# Patient Record
Sex: FEMALE | Race: WHITE | Employment: OTHER | URBAN - METROPOLITAN AREA
[De-identification: names, ages, dates, MRNs, and addresses within clinical notes are randomized per-mention and may not be internally consistent; named-entity substitution may affect disease eponyms.]

---

## 2020-01-01 ENCOUNTER — HOSPITAL ENCOUNTER (INPATIENT)
Age: 85
LOS: 16 days | Discharge: HOME | End: 2020-11-06
Attending: INTERNAL MEDICINE | Admitting: INTERNAL MEDICINE

## 2020-01-01 ENCOUNTER — HOME CARE VISIT (OUTPATIENT)
Dept: SCHEDULING | Facility: HOME HEALTH | Age: 85
End: 2020-01-01
Payer: MEDICARE

## 2020-01-01 ENCOUNTER — APPOINTMENT (OUTPATIENT)
Dept: GENERAL RADIOLOGY | Age: 85
DRG: 872 | End: 2020-01-01
Attending: EMERGENCY MEDICINE
Payer: MEDICARE

## 2020-01-01 ENCOUNTER — HOSPITAL ENCOUNTER (INPATIENT)
Age: 85
LOS: 4 days | Discharge: HOSPICE | DRG: 872 | End: 2020-08-31
Attending: EMERGENCY MEDICINE | Admitting: INTERNAL MEDICINE
Payer: MEDICARE

## 2020-01-01 ENCOUNTER — APPOINTMENT (OUTPATIENT)
Dept: MRI IMAGING | Age: 85
DRG: 872 | End: 2020-01-01
Attending: NURSE PRACTITIONER
Payer: MEDICARE

## 2020-01-01 ENCOUNTER — HOME CARE VISIT (OUTPATIENT)
Dept: HOSPICE | Facility: HOSPICE | Age: 85
End: 2020-01-01
Payer: MEDICARE

## 2020-01-01 ENCOUNTER — PATIENT OUTREACH (OUTPATIENT)
Dept: CASE MANAGEMENT | Age: 85
End: 2020-01-01

## 2020-01-01 ENCOUNTER — HOSPITAL ENCOUNTER (OUTPATIENT)
Dept: LAB | Age: 85
Discharge: HOME | End: 2020-10-11
Payer: OTHER MISCELLANEOUS

## 2020-01-01 ENCOUNTER — HOME HEALTH ADMISSION (OUTPATIENT)
Dept: HOME HEALTH SERVICES | Facility: HOME HEALTH | Age: 85
End: 2020-01-01

## 2020-01-01 ENCOUNTER — HOSPICE ADMISSION (OUTPATIENT)
Dept: HOSPICE | Facility: HOSPICE | Age: 85
End: 2020-01-01
Payer: MEDICARE

## 2020-01-01 VITALS
DIASTOLIC BLOOD PRESSURE: 62 MMHG | RESPIRATION RATE: 16 BRPM | TEMPERATURE: 98.9 F | SYSTOLIC BLOOD PRESSURE: 110 MMHG | HEART RATE: 70 BPM

## 2020-01-01 VITALS
SYSTOLIC BLOOD PRESSURE: 140 MMHG | DIASTOLIC BLOOD PRESSURE: 80 MMHG | HEART RATE: 80 BPM | BODY MASS INDEX: 18.61 KG/M2 | RESPIRATION RATE: 20 BRPM | HEIGHT: 63 IN | WEIGHT: 105 LBS

## 2020-01-01 VITALS
HEART RATE: 84 BPM | SYSTOLIC BLOOD PRESSURE: 156 MMHG | RESPIRATION RATE: 18 BRPM | TEMPERATURE: 97.1 F | DIASTOLIC BLOOD PRESSURE: 74 MMHG

## 2020-01-01 VITALS
TEMPERATURE: 99 F | RESPIRATION RATE: 14 BRPM | HEIGHT: 62 IN | DIASTOLIC BLOOD PRESSURE: 76 MMHG | BODY MASS INDEX: 27.42 KG/M2 | OXYGEN SATURATION: 95 % | WEIGHT: 149 LBS | SYSTOLIC BLOOD PRESSURE: 166 MMHG | HEART RATE: 61 BPM

## 2020-01-01 VITALS
DIASTOLIC BLOOD PRESSURE: 78 MMHG | TEMPERATURE: 98.1 F | RESPIRATION RATE: 18 BRPM | HEART RATE: 84 BPM | SYSTOLIC BLOOD PRESSURE: 132 MMHG

## 2020-01-01 VITALS
SYSTOLIC BLOOD PRESSURE: 81 MMHG | HEART RATE: 83 BPM | RESPIRATION RATE: 20 BRPM | DIASTOLIC BLOOD PRESSURE: 52 MMHG | TEMPERATURE: 97 F

## 2020-01-01 VITALS
RESPIRATION RATE: 18 BRPM | TEMPERATURE: 97.3 F | HEART RATE: 76 BPM | DIASTOLIC BLOOD PRESSURE: 60 MMHG | SYSTOLIC BLOOD PRESSURE: 100 MMHG

## 2020-01-01 VITALS
DIASTOLIC BLOOD PRESSURE: 63 MMHG | TEMPERATURE: 98 F | SYSTOLIC BLOOD PRESSURE: 121 MMHG | HEART RATE: 79 BPM | RESPIRATION RATE: 18 BRPM

## 2020-01-01 VITALS
HEART RATE: 76 BPM | TEMPERATURE: 97.8 F | SYSTOLIC BLOOD PRESSURE: 146 MMHG | RESPIRATION RATE: 14 BRPM | DIASTOLIC BLOOD PRESSURE: 72 MMHG

## 2020-01-01 DIAGNOSIS — N39.0 URINARY TRACT INFECTION WITHOUT HEMATURIA, SITE UNSPECIFIED: Primary | ICD-10-CM

## 2020-01-01 LAB
ALBUMIN SERPL-MCNC: 3.9 G/DL (ref 3.2–4.6)
ALBUMIN/GLOB SERPL: 1.1 {RATIO} (ref 1.2–3.5)
ALP SERPL-CCNC: 47 U/L (ref 50–130)
ALT SERPL-CCNC: 20 U/L (ref 12–65)
ANION GAP SERPL CALC-SCNC: 4 MMOL/L (ref 7–16)
ANION GAP SERPL CALC-SCNC: 6 MMOL/L (ref 7–16)
ANION GAP SERPL CALC-SCNC: 7 MMOL/L (ref 7–16)
ANION GAP SERPL CALC-SCNC: 8 MMOL/L (ref 7–16)
ANION GAP SERPL CALC-SCNC: 8 MMOL/L (ref 7–16)
APPEARANCE UR: ABNORMAL
AST SERPL-CCNC: 62 U/L (ref 15–37)
BACTERIA SPEC CULT: ABNORMAL
BACTERIA URNS QL MICRO: ABNORMAL /HPF
BACTERIA URNS QL MICRO: ABNORMAL /HPF
BASOPHILS # BLD: 0.1 K/UL (ref 0–0.2)
BASOPHILS NFR BLD: 1 % (ref 0–2)
BILIRUB SERPL-MCNC: 0.5 MG/DL (ref 0.2–1.1)
BILIRUB UR QL: NEGATIVE
BUN SERPL-MCNC: 14 MG/DL (ref 8–23)
BUN SERPL-MCNC: 19 MG/DL (ref 8–23)
BUN SERPL-MCNC: 20 MG/DL (ref 8–23)
BUN SERPL-MCNC: 30 MG/DL (ref 8–23)
BUN SERPL-MCNC: 36 MG/DL (ref 8–23)
CALCIUM SERPL-MCNC: 8.3 MG/DL (ref 8.3–10.4)
CALCIUM SERPL-MCNC: 8.4 MG/DL (ref 8.3–10.4)
CALCIUM SERPL-MCNC: 8.7 MG/DL (ref 8.3–10.4)
CALCIUM SERPL-MCNC: 8.9 MG/DL (ref 8.3–10.4)
CALCIUM SERPL-MCNC: 9.6 MG/DL (ref 8.3–10.4)
CASTS URNS QL MICRO: ABNORMAL /LPF
CASTS URNS QL MICRO: ABNORMAL /LPF
CHLORIDE SERPL-SCNC: 108 MMOL/L (ref 98–107)
CHLORIDE SERPL-SCNC: 109 MMOL/L (ref 98–107)
CHLORIDE SERPL-SCNC: 110 MMOL/L (ref 98–107)
CHLORIDE SERPL-SCNC: 111 MMOL/L (ref 98–107)
CHLORIDE SERPL-SCNC: 115 MMOL/L (ref 98–107)
CO2 SERPL-SCNC: 23 MMOL/L (ref 21–32)
CO2 SERPL-SCNC: 24 MMOL/L (ref 21–32)
CO2 SERPL-SCNC: 25 MMOL/L (ref 21–32)
CO2 SERPL-SCNC: 25 MMOL/L (ref 21–32)
CO2 SERPL-SCNC: 28 MMOL/L (ref 21–32)
COLOR UR: YELLOW
CREAT SERPL-MCNC: 0.88 MG/DL (ref 0.6–1)
CREAT SERPL-MCNC: 0.95 MG/DL (ref 0.6–1)
CREAT SERPL-MCNC: 0.98 MG/DL (ref 0.6–1)
CREAT SERPL-MCNC: 1.09 MG/DL (ref 0.6–1)
CREAT SERPL-MCNC: 1.32 MG/DL (ref 0.6–1)
DIFFERENTIAL METHOD BLD: ABNORMAL
EOSINOPHIL # BLD: 0.1 K/UL (ref 0–0.8)
EOSINOPHIL # BLD: 0.3 K/UL (ref 0–0.8)
EOSINOPHIL # BLD: 0.3 K/UL (ref 0–0.8)
EOSINOPHIL NFR BLD: 0 % (ref 0.5–7.8)
EOSINOPHIL NFR BLD: 1 % (ref 0.5–7.8)
EOSINOPHIL NFR BLD: 1 % (ref 0.5–7.8)
EOSINOPHIL NFR BLD: 2 % (ref 0.5–7.8)
EOSINOPHIL NFR BLD: 4 % (ref 0.5–7.8)
EPI CELLS #/AREA URNS HPF: ABNORMAL /HPF
EPI CELLS #/AREA URNS HPF: ABNORMAL /HPF
ERYTHROCYTE [DISTWIDTH] IN BLOOD BY AUTOMATED COUNT: 14 % (ref 11.9–14.6)
ERYTHROCYTE [DISTWIDTH] IN BLOOD BY AUTOMATED COUNT: 14.1 % (ref 11.9–14.6)
ERYTHROCYTE [DISTWIDTH] IN BLOOD BY AUTOMATED COUNT: 14.3 % (ref 11.9–14.6)
ERYTHROCYTE [DISTWIDTH] IN BLOOD BY AUTOMATED COUNT: 14.3 % (ref 11.9–14.6)
ERYTHROCYTE [DISTWIDTH] IN BLOOD BY AUTOMATED COUNT: 14.4 % (ref 11.9–14.6)
EST. AVERAGE GLUCOSE BLD GHB EST-MCNC: 114 MG/DL
GLOBULIN SER CALC-MCNC: 3.4 G/DL (ref 2.3–3.5)
GLUCOSE SERPL-MCNC: 103 MG/DL (ref 65–100)
GLUCOSE SERPL-MCNC: 114 MG/DL (ref 65–100)
GLUCOSE SERPL-MCNC: 121 MG/DL (ref 65–100)
GLUCOSE SERPL-MCNC: 150 MG/DL (ref 65–100)
GLUCOSE SERPL-MCNC: 95 MG/DL (ref 65–100)
GLUCOSE UR STRIP.AUTO-MCNC: NEGATIVE MG/DL
HBA1C MFR BLD: 5.6 %
HCT VFR BLD AUTO: 31.4 % (ref 35.8–46.3)
HCT VFR BLD AUTO: 33.4 % (ref 35.8–46.3)
HCT VFR BLD AUTO: 33.5 % (ref 35.8–46.3)
HCT VFR BLD AUTO: 37.1 % (ref 35.8–46.3)
HCT VFR BLD AUTO: 38.5 % (ref 35.8–46.3)
HGB BLD-MCNC: 10.5 G/DL (ref 11.7–15.4)
HGB BLD-MCNC: 10.6 G/DL (ref 11.7–15.4)
HGB BLD-MCNC: 11.1 G/DL (ref 11.7–15.4)
HGB BLD-MCNC: 12.1 G/DL (ref 11.7–15.4)
HGB BLD-MCNC: 12.8 G/DL (ref 11.7–15.4)
HGB UR QL STRIP: ABNORMAL
IMM GRANULOCYTES # BLD AUTO: 0 K/UL (ref 0–0.5)
IMM GRANULOCYTES # BLD AUTO: 0.1 K/UL (ref 0–0.5)
IMM GRANULOCYTES # BLD AUTO: 0.1 K/UL (ref 0–0.5)
IMM GRANULOCYTES NFR BLD AUTO: 0 % (ref 0–5)
IMM GRANULOCYTES NFR BLD AUTO: 1 % (ref 0–5)
KETONES UR QL STRIP.AUTO: NEGATIVE MG/DL
LACTATE SERPL-SCNC: 1.1 MMOL/L (ref 0.4–2)
LEUKOCYTE ESTERASE UR QL STRIP.AUTO: ABNORMAL
LIPASE SERPL-CCNC: 192 U/L (ref 73–393)
LYMPHOCYTES # BLD: 1.4 K/UL (ref 0.5–4.6)
LYMPHOCYTES # BLD: 1.4 K/UL (ref 0.5–4.6)
LYMPHOCYTES # BLD: 1.8 K/UL (ref 0.5–4.6)
LYMPHOCYTES # BLD: 1.8 K/UL (ref 0.5–4.6)
LYMPHOCYTES # BLD: 2 K/UL (ref 0.5–4.6)
LYMPHOCYTES NFR BLD: 13 % (ref 13–44)
LYMPHOCYTES NFR BLD: 14 % (ref 13–44)
LYMPHOCYTES NFR BLD: 15 % (ref 13–44)
LYMPHOCYTES NFR BLD: 15 % (ref 13–44)
LYMPHOCYTES NFR BLD: 8 % (ref 13–44)
MAGNESIUM SERPL-MCNC: 2.2 MG/DL (ref 1.8–2.4)
MAGNESIUM SERPL-MCNC: 2.3 MG/DL (ref 1.8–2.4)
MAGNESIUM SERPL-MCNC: 2.4 MG/DL (ref 1.8–2.4)
MCH RBC QN AUTO: 30.3 PG (ref 26.1–32.9)
MCH RBC QN AUTO: 30.8 PG (ref 26.1–32.9)
MCH RBC QN AUTO: 31.3 PG (ref 26.1–32.9)
MCH RBC QN AUTO: 31.4 PG (ref 26.1–32.9)
MCH RBC QN AUTO: 31.4 PG (ref 26.1–32.9)
MCHC RBC AUTO-ENTMCNC: 31.6 G/DL (ref 31.4–35)
MCHC RBC AUTO-ENTMCNC: 32.6 G/DL (ref 31.4–35)
MCHC RBC AUTO-ENTMCNC: 33.2 G/DL (ref 31.4–35)
MCHC RBC AUTO-ENTMCNC: 33.2 G/DL (ref 31.4–35)
MCHC RBC AUTO-ENTMCNC: 33.4 G/DL (ref 31.4–35)
MCV RBC AUTO: 94 FL (ref 79.6–97.8)
MCV RBC AUTO: 94.1 FL (ref 79.6–97.8)
MCV RBC AUTO: 94.4 FL (ref 79.6–97.8)
MCV RBC AUTO: 94.6 FL (ref 79.6–97.8)
MCV RBC AUTO: 95.7 FL (ref 79.6–97.8)
MM INDURATION POC: 0 MM (ref 0–5)
MM INDURATION POC: 0 MM (ref 0–5)
MONOCYTES # BLD: 0.8 K/UL (ref 0.1–1.3)
MONOCYTES # BLD: 0.9 K/UL (ref 0.1–1.3)
MONOCYTES # BLD: 1.3 K/UL (ref 0.1–1.3)
MONOCYTES NFR BLD: 5 % (ref 4–12)
MONOCYTES NFR BLD: 7 % (ref 4–12)
MONOCYTES NFR BLD: 7 % (ref 4–12)
MONOCYTES NFR BLD: 8 % (ref 4–12)
MONOCYTES NFR BLD: 9 % (ref 4–12)
MUCOUS THREADS URNS QL MICRO: 0 /LPF
NEUTS SEG # BLD: 12.5 K/UL (ref 1.7–8.2)
NEUTS SEG # BLD: 14 K/UL (ref 1.7–8.2)
NEUTS SEG # BLD: 6.5 K/UL (ref 1.7–8.2)
NEUTS SEG # BLD: 9.5 K/UL (ref 1.7–8.2)
NEUTS SEG # BLD: 9.6 K/UL (ref 1.7–8.2)
NEUTS SEG NFR BLD: 71 % (ref 43–78)
NEUTS SEG NFR BLD: 76 % (ref 43–78)
NEUTS SEG NFR BLD: 76 % (ref 43–78)
NEUTS SEG NFR BLD: 81 % (ref 43–78)
NEUTS SEG NFR BLD: 83 % (ref 43–78)
NITRITE UR QL STRIP.AUTO: NEGATIVE
NRBC # BLD: 0 K/UL (ref 0–0.2)
PH UR STRIP: 8.5 [PH] (ref 5–9)
PLATELET # BLD AUTO: 311 K/UL (ref 150–450)
PLATELET # BLD AUTO: 333 K/UL (ref 150–450)
PLATELET # BLD AUTO: 374 K/UL (ref 150–450)
PLATELET # BLD AUTO: 381 K/UL (ref 150–450)
PLATELET # BLD AUTO: 404 K/UL (ref 150–450)
PMV BLD AUTO: 10.1 FL (ref 9.4–12.3)
PMV BLD AUTO: 10.1 FL (ref 9.4–12.3)
PMV BLD AUTO: 10.3 FL (ref 9.4–12.3)
PMV BLD AUTO: 10.3 FL (ref 9.4–12.3)
PMV BLD AUTO: 10.5 FL (ref 9.4–12.3)
POTASSIUM SERPL-SCNC: 2.9 MMOL/L (ref 3.5–5.1)
POTASSIUM SERPL-SCNC: 3.4 MMOL/L (ref 3.5–5.1)
POTASSIUM SERPL-SCNC: 3.5 MMOL/L (ref 3.5–5.1)
POTASSIUM SERPL-SCNC: 3.5 MMOL/L (ref 3.5–5.1)
POTASSIUM SERPL-SCNC: 3.6 MMOL/L (ref 3.5–5.1)
PPD POC: NEGATIVE NEGATIVE
PPD POC: NEGATIVE NEGATIVE
PROCALCITONIN SERPL-MCNC: 2.21 NG/ML
PROT SERPL-MCNC: 7.3 G/DL (ref 6.3–8.2)
PROT UR STRIP-MCNC: 100 MG/DL
RBC # BLD AUTO: 3.34 M/UL (ref 4.05–5.2)
RBC # BLD AUTO: 3.5 M/UL (ref 4.05–5.2)
RBC # BLD AUTO: 3.55 M/UL (ref 4.05–5.2)
RBC # BLD AUTO: 3.93 M/UL (ref 4.05–5.2)
RBC # BLD AUTO: 4.07 M/UL (ref 4.05–5.2)
RBC #/AREA URNS HPF: ABNORMAL /HPF
RBC #/AREA URNS HPF: ABNORMAL /HPF
SERVICE CMNT-IMP: ABNORMAL
SERVICE CMNT-IMP: ABNORMAL
SODIUM SERPL-SCNC: 140 MMOL/L (ref 136–145)
SODIUM SERPL-SCNC: 141 MMOL/L (ref 136–145)
SODIUM SERPL-SCNC: 142 MMOL/L (ref 136–145)
SODIUM SERPL-SCNC: 143 MMOL/L (ref 136–145)
SODIUM SERPL-SCNC: 145 MMOL/L (ref 136–145)
SP GR UR REFRACTOMETRY: 1.01 (ref 1–1.02)
UROBILINOGEN UR QL STRIP.AUTO: 0.2 EU/DL (ref 0.2–1)
WBC # BLD AUTO: 12.4 K/UL (ref 4.3–11.1)
WBC # BLD AUTO: 12.7 K/UL (ref 4.3–11.1)
WBC # BLD AUTO: 15.5 K/UL (ref 4.3–11.1)
WBC # BLD AUTO: 16.9 K/UL (ref 4.3–11.1)
WBC # BLD AUTO: 9.2 K/UL (ref 4.3–11.1)
WBC URNS QL MICRO: >100 /HPF
WBC URNS QL MICRO: ABNORMAL /HPF
YEAST URNS QL MICRO: ABNORMAL

## 2020-01-01 PROCEDURE — 74011250636 HC RX REV CODE- 250/636: Performed by: INTERNAL MEDICINE

## 2020-01-01 PROCEDURE — A6212 FOAM DRG <=16 SQ IN W/BORDER: HCPCS

## 2020-01-01 PROCEDURE — 74011250637 HC RX REV CODE- 250/637: Performed by: NURSE PRACTITIONER

## 2020-01-01 PROCEDURE — 0651 HSPC ROUTINE HOME CARE

## 2020-01-01 PROCEDURE — G0156 HHCP-SVS OF AIDE,EA 15 MIN: HCPCS

## 2020-01-01 PROCEDURE — 74011250636 HC RX REV CODE- 250/636: Performed by: NURSE PRACTITIONER

## 2020-01-01 PROCEDURE — G0299 HHS/HOSPICE OF RN EA 15 MIN: HCPCS

## 2020-01-01 PROCEDURE — A6248 HYDROGEL DRSG GEL FILLER: HCPCS

## 2020-01-01 PROCEDURE — 0656 HSPC GENERAL INPATIENT

## 2020-01-01 PROCEDURE — G0155 HHCP-SVS OF CSW,EA 15 MIN: HCPCS

## 2020-01-01 PROCEDURE — 85025 COMPLETE CBC W/AUTO DIFF WBC: CPT

## 2020-01-01 PROCEDURE — A5120 SKIN BARRIER, WIPE OR SWAB: HCPCS

## 2020-01-01 PROCEDURE — 87088 URINE BACTERIA CULTURE: CPT

## 2020-01-01 PROCEDURE — A9270 NON-COVERED ITEM OR SERVICE: HCPCS

## 2020-01-01 PROCEDURE — 77030040393 HC DRSG OPTIFOAM GENT MDII -B

## 2020-01-01 PROCEDURE — 74011250636 HC RX REV CODE- 250/636: Performed by: PHYSICIAN ASSISTANT

## 2020-01-01 PROCEDURE — 74011000258 HC RX REV CODE- 258: Performed by: NURSE PRACTITIONER

## 2020-01-01 PROCEDURE — 74011000250 HC RX REV CODE- 250: Performed by: NURSE PRACTITIONER

## 2020-01-01 PROCEDURE — 87086 URINE CULTURE/COLONY COUNT: CPT

## 2020-01-01 PROCEDURE — 81001 URINALYSIS AUTO W/SCOPE: CPT

## 2020-01-01 PROCEDURE — 84145 PROCALCITONIN (PCT): CPT

## 2020-01-01 PROCEDURE — A6260 WOUND CLEANSER ANY TYPE/SIZE: HCPCS

## 2020-01-01 PROCEDURE — 83735 ASSAY OF MAGNESIUM: CPT

## 2020-01-01 PROCEDURE — 97530 THERAPEUTIC ACTIVITIES: CPT

## 2020-01-01 PROCEDURE — T4523 ADULT SIZE BRIEF/DIAPER LG: HCPCS

## 2020-01-01 PROCEDURE — 83036 HEMOGLOBIN GLYCOSYLATED A1C: CPT

## 2020-01-01 PROCEDURE — HOSPICE MEDICATION HC HH HOSPICE MEDICATION

## 2020-01-01 PROCEDURE — 65270000029 HC RM PRIVATE

## 2020-01-01 PROCEDURE — 80048 BASIC METABOLIC PNL TOTAL CA: CPT

## 2020-01-01 PROCEDURE — 71045 X-RAY EXAM CHEST 1 VIEW: CPT

## 2020-01-01 PROCEDURE — 74011250636 HC RX REV CODE- 250/636: Performed by: HOSPITALIST

## 2020-01-01 PROCEDURE — 87186 SC STD MICRODIL/AGAR DIL: CPT

## 2020-01-01 PROCEDURE — 74011250637 HC RX REV CODE- 250/637: Performed by: INTERNAL MEDICINE

## 2020-01-01 PROCEDURE — T4541 LARGE DISPOSABLE UNDERPAD: HCPCS

## 2020-01-01 PROCEDURE — 97166 OT EVAL MOD COMPLEX 45 MIN: CPT

## 2020-01-01 PROCEDURE — 74011250637 HC RX REV CODE- 250/637: Performed by: HOSPITALIST

## 2020-01-01 PROCEDURE — 51701 INSERT BLADDER CATHETER: CPT

## 2020-01-01 PROCEDURE — 81015 MICROSCOPIC EXAM OF URINE: CPT

## 2020-01-01 PROCEDURE — A6196 ALGINATE DRESSING <=16 SQ IN: HCPCS

## 2020-01-01 PROCEDURE — 94761 N-INVAS EAR/PLS OXIMETRY MLT: CPT

## 2020-01-01 PROCEDURE — 36415 COLL VENOUS BLD VENIPUNCTURE: CPT

## 2020-01-01 PROCEDURE — A4338 INDWELLING CATHETER LATEX: HCPCS

## 2020-01-01 PROCEDURE — 80053 COMPREHEN METABOLIC PANEL: CPT

## 2020-01-01 PROCEDURE — 97535 SELF CARE MNGMENT TRAINING: CPT

## 2020-01-01 PROCEDURE — 86580 TB INTRADERMAL TEST: CPT | Performed by: NURSE PRACTITIONER

## 2020-01-01 PROCEDURE — 97161 PT EVAL LOW COMPLEX 20 MIN: CPT

## 2020-01-01 PROCEDURE — 3336500001 HSPC ELECTION

## 2020-01-01 PROCEDURE — 74011000258 HC RX REV CODE- 258: Performed by: HOSPITALIST

## 2020-01-01 PROCEDURE — 99284 EMERGENCY DEPT VISIT MOD MDM: CPT

## 2020-01-01 PROCEDURE — 83690 ASSAY OF LIPASE: CPT

## 2020-01-01 PROCEDURE — A6250 SKIN SEAL PROTECT MOISTURIZR: HCPCS

## 2020-01-01 PROCEDURE — 96360 HYDRATION IV INFUSION INIT: CPT

## 2020-01-01 PROCEDURE — 73502 X-RAY EXAM HIP UNI 2-3 VIEWS: CPT

## 2020-01-01 PROCEDURE — 73721 MRI JNT OF LWR EXTRE W/O DYE: CPT

## 2020-01-01 PROCEDURE — A4310 INSERT TRAY W/O BAG/CATH: HCPCS

## 2020-01-01 PROCEDURE — A4927 NON-STERILE GLOVES: HCPCS

## 2020-01-01 PROCEDURE — 83605 ASSAY OF LACTIC ACID: CPT

## 2020-01-01 PROCEDURE — 74011000302 HC RX REV CODE- 302: Performed by: NURSE PRACTITIONER

## 2020-01-01 RX ORDER — ACETAMINOPHEN, DIPHENHYDRAMINE HCL, PHENYLEPHRINE HCL 325; 25; 5 MG/1; MG/1; MG/1
1 TABLET ORAL
COMMUNITY
End: 2020-01-01 | Stop reason: SDUPTHER

## 2020-01-01 RX ORDER — MORPHINE SULFATE 4 MG/ML
4 INJECTION INTRAVENOUS
Status: DISCONTINUED | OUTPATIENT
Start: 2020-01-01 | End: 2020-01-01

## 2020-01-01 RX ORDER — SODIUM CHLORIDE 0.9 % (FLUSH) 0.9 %
3 SYRINGE (ML) INJECTION AS NEEDED
Status: DISCONTINUED | OUTPATIENT
Start: 2020-01-01 | End: 2020-01-01

## 2020-01-01 RX ORDER — MORPHINE SULFATE 2 MG/ML
2 INJECTION, SOLUTION INTRAMUSCULAR; INTRAVENOUS
Status: DISCONTINUED | OUTPATIENT
Start: 2020-01-01 | End: 2020-01-01 | Stop reason: HOSPADM

## 2020-01-01 RX ORDER — MORPHINE SULFATE 4 MG/ML
4 INJECTION INTRAVENOUS
Status: DISCONTINUED | OUTPATIENT
Start: 2020-01-01 | End: 2020-01-01 | Stop reason: HOSPADM

## 2020-01-01 RX ORDER — METRONIDAZOLE 250 MG/1
500 TABLET ORAL DAILY
Status: DISCONTINUED | OUTPATIENT
Start: 2020-01-01 | End: 2020-01-01

## 2020-01-01 RX ORDER — DEXAMETHASONE SODIUM PHOSPHATE 100 MG/10ML
10 INJECTION INTRAMUSCULAR; INTRAVENOUS ONCE
Status: DISCONTINUED | OUTPATIENT
Start: 2020-01-01 | End: 2020-01-01

## 2020-01-01 RX ORDER — GLYCOPYRROLATE 0.2 MG/ML
0.2 INJECTION INTRAMUSCULAR; INTRAVENOUS
Status: DISCONTINUED | OUTPATIENT
Start: 2020-01-01 | End: 2020-01-01 | Stop reason: HOSPADM

## 2020-01-01 RX ORDER — DOXYLAMINE SUCCINATE 25 MG
TABLET ORAL AS NEEDED
Status: DISCONTINUED | OUTPATIENT
Start: 2020-01-01 | End: 2020-01-01 | Stop reason: HOSPADM

## 2020-01-01 RX ORDER — GUAIFENESIN 100 MG/5ML
81 LIQUID (ML) ORAL DAILY
Status: DISCONTINUED | OUTPATIENT
Start: 2020-01-01 | End: 2020-01-01 | Stop reason: HOSPADM

## 2020-01-01 RX ORDER — METRONIDAZOLE 250 MG/1
500 TABLET ORAL AS NEEDED
Status: DISCONTINUED | OUTPATIENT
Start: 2020-01-01 | End: 2020-01-01 | Stop reason: HOSPADM

## 2020-01-01 RX ORDER — HALOPERIDOL 5 MG/ML
2 INJECTION INTRAMUSCULAR
Status: DISCONTINUED | OUTPATIENT
Start: 2020-01-01 | End: 2020-01-01 | Stop reason: HOSPADM

## 2020-01-01 RX ORDER — POTASSIUM CHLORIDE 14.9 MG/ML
20 INJECTION INTRAVENOUS ONCE
Status: COMPLETED | OUTPATIENT
Start: 2020-01-01 | End: 2020-01-01

## 2020-01-01 RX ORDER — GUAIFENESIN 100 MG/5ML
81 LIQUID (ML) ORAL DAILY
COMMUNITY
End: 2020-01-01 | Stop reason: CLARIF

## 2020-01-01 RX ORDER — SODIUM CHLORIDE 9 MG/ML
100 INJECTION, SOLUTION INTRAVENOUS CONTINUOUS
Status: DISPENSED | OUTPATIENT
Start: 2020-01-01 | End: 2020-01-01

## 2020-01-01 RX ORDER — SODIUM CHLORIDE 0.9 % (FLUSH) 0.9 %
3 SYRINGE (ML) INJECTION EVERY 12 HOURS
Status: DISCONTINUED | OUTPATIENT
Start: 2020-01-01 | End: 2020-01-01

## 2020-01-01 RX ORDER — LISINOPRIL 20 MG/1
20 TABLET ORAL DAILY
Status: DISCONTINUED | OUTPATIENT
Start: 2020-01-01 | End: 2020-01-01 | Stop reason: HOSPADM

## 2020-01-01 RX ORDER — MONTELUKAST SODIUM 10 MG/1
10 TABLET ORAL DAILY
Status: DISCONTINUED | OUTPATIENT
Start: 2020-01-01 | End: 2020-01-01 | Stop reason: HOSPADM

## 2020-01-01 RX ORDER — FENTANYL 12.5 UG/1
1 PATCH TRANSDERMAL
Status: DISCONTINUED | OUTPATIENT
Start: 2020-01-01 | End: 2020-01-01

## 2020-01-01 RX ORDER — METRONIDAZOLE 250 MG/1
500 TABLET ORAL
Status: DISCONTINUED | OUTPATIENT
Start: 2020-01-01 | End: 2020-01-01

## 2020-01-01 RX ORDER — SODIUM CHLORIDE 9 MG/ML
250 INJECTION, SOLUTION INTRAVENOUS AS NEEDED
Status: DISCONTINUED | OUTPATIENT
Start: 2020-01-01 | End: 2020-01-01

## 2020-01-01 RX ORDER — ONDANSETRON 2 MG/ML
4 INJECTION INTRAMUSCULAR; INTRAVENOUS
Status: DISCONTINUED | OUTPATIENT
Start: 2020-01-01 | End: 2020-01-01 | Stop reason: HOSPADM

## 2020-01-01 RX ORDER — METRONIDAZOLE 250 MG/1
500 TABLET ORAL EVERY 12 HOURS
Status: DISCONTINUED | OUTPATIENT
Start: 2020-01-01 | End: 2020-01-01

## 2020-01-01 RX ORDER — CEFPODOXIME PROXETIL 100 MG/1
100 TABLET, FILM COATED ORAL 2 TIMES DAILY
Qty: 4 TAB | Refills: 0 | Status: SHIPPED | OUTPATIENT
Start: 2020-01-01 | End: 2020-01-01

## 2020-01-01 RX ORDER — LIDOCAINE AND MENTHOL 40; 40 MG/1; MG/1
1 PATCH TOPICAL EVERY 24 HOURS
COMMUNITY
End: 2020-01-01

## 2020-01-01 RX ORDER — CETIRIZINE HYDROCHLORIDE 10 MG/1
1 CAPSULE, LIQUID FILLED ORAL
COMMUNITY
End: 2020-01-01 | Stop reason: SDUPTHER

## 2020-01-01 RX ORDER — MORPHINE SULFATE 2 MG/ML
2 INJECTION, SOLUTION INTRAMUSCULAR; INTRAVENOUS
Status: DISCONTINUED | OUTPATIENT
Start: 2020-01-01 | End: 2020-01-01

## 2020-01-01 RX ORDER — DOXYLAMINE SUCCINATE 25 MG
TABLET ORAL DAILY
Status: DISCONTINUED | OUTPATIENT
Start: 2020-01-01 | End: 2020-01-01

## 2020-01-01 RX ORDER — DOXYLAMINE SUCCINATE 25 MG
TABLET ORAL
Status: DISCONTINUED | OUTPATIENT
Start: 2020-01-01 | End: 2020-01-01

## 2020-01-01 RX ORDER — FENTANYL 25 UG/1
1 PATCH TRANSDERMAL
Status: DISCONTINUED | OUTPATIENT
Start: 2020-01-01 | End: 2020-01-01

## 2020-01-01 RX ORDER — SODIUM CHLORIDE 0.9 % (FLUSH) 0.9 %
5-40 SYRINGE (ML) INJECTION EVERY 8 HOURS
Status: DISCONTINUED | OUTPATIENT
Start: 2020-01-01 | End: 2020-01-01 | Stop reason: HOSPADM

## 2020-01-01 RX ORDER — MECLIZINE HYDROCHLORIDE 25 MG/1
25 TABLET ORAL
COMMUNITY
End: 2020-01-01 | Stop reason: SDUPTHER

## 2020-01-01 RX ORDER — ACETAMINOPHEN 650 MG/1
650 SUPPOSITORY RECTAL
Status: DISCONTINUED | OUTPATIENT
Start: 2020-01-01 | End: 2020-01-01 | Stop reason: HOSPADM

## 2020-01-01 RX ORDER — ATORVASTATIN CALCIUM 10 MG/1
10 TABLET, FILM COATED ORAL
Status: DISCONTINUED | OUTPATIENT
Start: 2020-01-01 | End: 2020-01-01 | Stop reason: HOSPADM

## 2020-01-01 RX ORDER — HYDRALAZINE HYDROCHLORIDE 20 MG/ML
20 INJECTION INTRAMUSCULAR; INTRAVENOUS
Status: DISCONTINUED | OUTPATIENT
Start: 2020-01-01 | End: 2020-01-01

## 2020-01-01 RX ORDER — HALOPERIDOL 5 MG/ML
2 INJECTION INTRAMUSCULAR
Status: DISCONTINUED | OUTPATIENT
Start: 2020-01-01 | End: 2020-01-01

## 2020-01-01 RX ORDER — ACETAMINOPHEN 325 MG/1
650 TABLET ORAL
Status: DISCONTINUED | OUTPATIENT
Start: 2020-01-01 | End: 2020-01-01 | Stop reason: HOSPADM

## 2020-01-01 RX ORDER — DOXYLAMINE SUCCINATE 25 MG
TABLET ORAL EVERY 12 HOURS
Status: DISCONTINUED | OUTPATIENT
Start: 2020-01-01 | End: 2020-01-01

## 2020-01-01 RX ORDER — FENTANYL 50 UG/1
1 PATCH TRANSDERMAL
Status: DISCONTINUED | OUTPATIENT
Start: 2020-01-01 | End: 2020-01-01 | Stop reason: HOSPADM

## 2020-01-01 RX ORDER — SODIUM CHLORIDE 0.9 % (FLUSH) 0.9 %
5-40 SYRINGE (ML) INJECTION AS NEEDED
Status: DISCONTINUED | OUTPATIENT
Start: 2020-01-01 | End: 2020-01-01 | Stop reason: HOSPADM

## 2020-01-01 RX ORDER — DOCUSATE SODIUM 100 MG/1
100 CAPSULE, LIQUID FILLED ORAL
Status: DISCONTINUED | OUTPATIENT
Start: 2020-01-01 | End: 2020-01-01 | Stop reason: HOSPADM

## 2020-01-01 RX ORDER — DEXTROSE MONOHYDRATE AND SODIUM CHLORIDE 5; .45 G/100ML; G/100ML
125 INJECTION, SOLUTION INTRAVENOUS CONTINUOUS
Status: DISCONTINUED | OUTPATIENT
Start: 2020-01-01 | End: 2020-01-01 | Stop reason: HOSPADM

## 2020-01-01 RX ORDER — LORATADINE 10 MG/1
10 TABLET ORAL DAILY
Status: DISCONTINUED | OUTPATIENT
Start: 2020-01-01 | End: 2020-01-01 | Stop reason: HOSPADM

## 2020-01-01 RX ORDER — FACIAL-BODY WIPES
10 EACH TOPICAL AS NEEDED
Status: DISCONTINUED | OUTPATIENT
Start: 2020-01-01 | End: 2020-01-01 | Stop reason: HOSPADM

## 2020-01-01 RX ORDER — AMLODIPINE BESYLATE 5 MG/1
5 TABLET ORAL DAILY
Status: DISCONTINUED | OUTPATIENT
Start: 2020-01-01 | End: 2020-01-01 | Stop reason: HOSPADM

## 2020-01-01 RX ORDER — LIDOCAINE 4 G/100G
1 PATCH TOPICAL DAILY
Status: DISCONTINUED | OUTPATIENT
Start: 2020-01-01 | End: 2020-01-01 | Stop reason: HOSPADM

## 2020-01-01 RX ORDER — MONTELUKAST SODIUM 10 MG/1
10 TABLET ORAL DAILY
COMMUNITY
End: 2020-01-01 | Stop reason: SDUPTHER

## 2020-01-01 RX ADMIN — MORPHINE SULFATE 2 MG: 2 INJECTION, SOLUTION INTRAMUSCULAR; INTRAVENOUS at 23:48

## 2020-01-01 RX ADMIN — HALOPERIDOL LACTATE 2 MG: 5 INJECTION, SOLUTION INTRAMUSCULAR at 15:14

## 2020-01-01 RX ADMIN — MORPHINE SULFATE 2 MG: 2 INJECTION, SOLUTION INTRAMUSCULAR; INTRAVENOUS at 07:18

## 2020-01-01 RX ADMIN — MORPHINE SULFATE 2 MG: 2 INJECTION, SOLUTION INTRAMUSCULAR; INTRAVENOUS at 22:30

## 2020-01-01 RX ADMIN — HALOPERIDOL LACTATE 2 MG: 5 INJECTION, SOLUTION INTRAMUSCULAR at 04:50

## 2020-01-01 RX ADMIN — CEFTRIAXONE 1 G: 1 INJECTION, POWDER, FOR SOLUTION INTRAMUSCULAR; INTRAVENOUS at 17:26

## 2020-01-01 RX ADMIN — MORPHINE SULFATE 4 MG: 4 INJECTION INTRAVENOUS at 02:47

## 2020-01-01 RX ADMIN — METRONIDAZOLE 500 MG: 250 TABLET ORAL at 01:10

## 2020-01-01 RX ADMIN — HALOPERIDOL LACTATE 2 MG: 5 INJECTION, SOLUTION INTRAMUSCULAR at 19:12

## 2020-01-01 RX ADMIN — SODIUM CHLORIDE, PRESERVATIVE FREE 3 ML: 5 INJECTION INTRAVENOUS at 05:16

## 2020-01-01 RX ADMIN — MORPHINE SULFATE 2 MG: 2 INJECTION, SOLUTION INTRAMUSCULAR; INTRAVENOUS at 16:06

## 2020-01-01 RX ADMIN — Medication: at 15:04

## 2020-01-01 RX ADMIN — MORPHINE SULFATE 2 MG: 2 INJECTION, SOLUTION INTRAMUSCULAR; INTRAVENOUS at 07:35

## 2020-01-01 RX ADMIN — MORPHINE SULFATE 4 MG: 4 INJECTION INTRAVENOUS at 00:32

## 2020-01-01 RX ADMIN — ACETAMINOPHEN 650 MG: 325 TABLET, FILM COATED ORAL at 11:00

## 2020-01-01 RX ADMIN — Medication: at 08:07

## 2020-01-01 RX ADMIN — MORPHINE SULFATE 4 MG: 4 INJECTION INTRAVENOUS at 16:05

## 2020-01-01 RX ADMIN — MORPHINE SULFATE 4 MG: 4 INJECTION INTRAVENOUS at 00:15

## 2020-01-01 RX ADMIN — MORPHINE SULFATE 4 MG: 4 INJECTION INTRAVENOUS at 08:13

## 2020-01-01 RX ADMIN — Medication 10 ML: at 06:38

## 2020-01-01 RX ADMIN — MORPHINE SULFATE 2 MG: 2 INJECTION, SOLUTION INTRAMUSCULAR; INTRAVENOUS at 20:49

## 2020-01-01 RX ADMIN — MORPHINE SULFATE 4 MG: 4 INJECTION INTRAVENOUS at 07:51

## 2020-01-01 RX ADMIN — METRONIDAZOLE 500 MG: 250 TABLET ORAL at 08:13

## 2020-01-01 RX ADMIN — HALOPERIDOL LACTATE 2 MG: 5 INJECTION, SOLUTION INTRAMUSCULAR at 07:38

## 2020-01-01 RX ADMIN — ATORVASTATIN CALCIUM 10 MG: 10 TABLET, FILM COATED ORAL at 00:00

## 2020-01-01 RX ADMIN — MORPHINE SULFATE 4 MG: 4 INJECTION INTRAVENOUS at 15:21

## 2020-01-01 RX ADMIN — Medication 10 ML: at 22:00

## 2020-01-01 RX ADMIN — GLYCOPYRROLATE 0.2 MG: 0.2 INJECTION, SOLUTION INTRAMUSCULAR; INTRAVENOUS at 05:52

## 2020-01-01 RX ADMIN — MORPHINE SULFATE 4 MG: 4 INJECTION INTRAVENOUS at 05:56

## 2020-01-01 RX ADMIN — METRONIDAZOLE 500 MG: 250 TABLET ORAL at 15:03

## 2020-01-01 RX ADMIN — MORPHINE SULFATE 4 MG: 4 INJECTION INTRAVENOUS at 15:47

## 2020-01-01 RX ADMIN — HALOPERIDOL LACTATE 2 MG: 5 INJECTION, SOLUTION INTRAMUSCULAR at 07:35

## 2020-01-01 RX ADMIN — HALOPERIDOL LACTATE 2 MG: 5 INJECTION, SOLUTION INTRAMUSCULAR at 01:30

## 2020-01-01 RX ADMIN — ACETAMINOPHEN 650 MG: 325 TABLET, FILM COATED ORAL at 04:55

## 2020-01-01 RX ADMIN — MORPHINE SULFATE 2 MG: 2 INJECTION, SOLUTION INTRAMUSCULAR; INTRAVENOUS at 09:17

## 2020-01-01 RX ADMIN — MORPHINE SULFATE 2 MG: 2 INJECTION, SOLUTION INTRAMUSCULAR; INTRAVENOUS at 07:38

## 2020-01-01 RX ADMIN — SODIUM CHLORIDE 100 ML/HR: 900 INJECTION, SOLUTION INTRAVENOUS at 15:50

## 2020-01-01 RX ADMIN — MORPHINE SULFATE 2 MG: 2 INJECTION, SOLUTION INTRAMUSCULAR; INTRAVENOUS at 05:24

## 2020-01-01 RX ADMIN — ASPIRIN 81 MG 81 MG: 81 TABLET ORAL at 08:31

## 2020-01-01 RX ADMIN — AMLODIPINE BESYLATE 5 MG: 5 TABLET ORAL at 09:17

## 2020-01-01 RX ADMIN — CEFTRIAXONE 1 G: 1 INJECTION, POWDER, FOR SOLUTION INTRAMUSCULAR; INTRAVENOUS at 17:46

## 2020-01-01 RX ADMIN — HALOPERIDOL LACTATE 2 MG: 5 INJECTION, SOLUTION INTRAMUSCULAR at 07:54

## 2020-01-01 RX ADMIN — MORPHINE SULFATE 4 MG: 4 INJECTION INTRAVENOUS at 13:17

## 2020-01-01 RX ADMIN — MORPHINE SULFATE 4 MG: 4 INJECTION INTRAVENOUS at 00:40

## 2020-01-01 RX ADMIN — SODIUM CHLORIDE 100 ML/HR: 900 INJECTION, SOLUTION INTRAVENOUS at 05:38

## 2020-01-01 RX ADMIN — Medication 10 ML: at 23:04

## 2020-01-01 RX ADMIN — METRONIDAZOLE 500 MG: 250 TABLET ORAL at 05:57

## 2020-01-01 RX ADMIN — MORPHINE SULFATE 2 MG: 2 INJECTION, SOLUTION INTRAMUSCULAR; INTRAVENOUS at 23:52

## 2020-01-01 RX ADMIN — MORPHINE SULFATE 2 MG: 2 INJECTION, SOLUTION INTRAMUSCULAR; INTRAVENOUS at 23:30

## 2020-01-01 RX ADMIN — SODIUM CHLORIDE, PRESERVATIVE FREE 3 ML: 5 INJECTION INTRAVENOUS at 21:15

## 2020-01-01 RX ADMIN — Medication 10 ML: at 22:12

## 2020-01-01 RX ADMIN — Medication: at 08:09

## 2020-01-01 RX ADMIN — MORPHINE SULFATE 2 MG: 2 INJECTION, SOLUTION INTRAMUSCULAR; INTRAVENOUS at 05:52

## 2020-01-01 RX ADMIN — ASPIRIN 81 MG 81 MG: 81 TABLET ORAL at 09:18

## 2020-01-01 RX ADMIN — HALOPERIDOL LACTATE 2 MG: 5 INJECTION, SOLUTION INTRAMUSCULAR at 16:07

## 2020-01-01 RX ADMIN — HALOPERIDOL LACTATE 2 MG: 5 INJECTION, SOLUTION INTRAMUSCULAR at 07:30

## 2020-01-01 RX ADMIN — LORATADINE 10 MG: 10 TABLET ORAL at 08:06

## 2020-01-01 RX ADMIN — DEXTROSE MONOHYDRATE AND SODIUM CHLORIDE 125 ML/HR: 5; .45 INJECTION, SOLUTION INTRAVENOUS at 04:36

## 2020-01-01 RX ADMIN — METRONIDAZOLE 500 MG: 250 TABLET ORAL at 05:50

## 2020-01-01 RX ADMIN — MORPHINE SULFATE 4 MG: 4 INJECTION INTRAVENOUS at 15:34

## 2020-01-01 RX ADMIN — SODIUM CHLORIDE 1000 ML: 900 INJECTION, SOLUTION INTRAVENOUS at 15:25

## 2020-01-01 RX ADMIN — HALOPERIDOL LACTATE 2 MG: 5 INJECTION, SOLUTION INTRAMUSCULAR at 08:56

## 2020-01-01 RX ADMIN — MORPHINE SULFATE 4 MG: 4 INJECTION INTRAVENOUS at 20:07

## 2020-01-01 RX ADMIN — HALOPERIDOL LACTATE 2 MG: 5 INJECTION, SOLUTION INTRAMUSCULAR at 10:44

## 2020-01-01 RX ADMIN — POTASSIUM BICARBONATE 20 MEQ: 782 TABLET, EFFERVESCENT ORAL at 17:47

## 2020-01-01 RX ADMIN — MORPHINE SULFATE 2 MG: 2 INJECTION, SOLUTION INTRAMUSCULAR; INTRAVENOUS at 23:01

## 2020-01-01 RX ADMIN — HALOPERIDOL LACTATE 2 MG: 5 INJECTION, SOLUTION INTRAMUSCULAR at 23:29

## 2020-01-01 RX ADMIN — Medication: at 07:37

## 2020-01-01 RX ADMIN — MORPHINE SULFATE 4 MG: 4 INJECTION INTRAVENOUS at 07:47

## 2020-01-01 RX ADMIN — METRONIDAZOLE 500 MG: 250 TABLET ORAL at 16:32

## 2020-01-01 RX ADMIN — Medication: at 15:30

## 2020-01-01 RX ADMIN — HALOPERIDOL LACTATE 2 MG: 5 INJECTION, SOLUTION INTRAMUSCULAR at 17:20

## 2020-01-01 RX ADMIN — HALOPERIDOL LACTATE 2 MG: 5 INJECTION, SOLUTION INTRAMUSCULAR at 00:40

## 2020-01-01 RX ADMIN — AMLODIPINE BESYLATE 5 MG: 5 TABLET ORAL at 08:31

## 2020-01-01 RX ADMIN — METRONIDAZOLE 500 MG: 250 TABLET ORAL at 15:29

## 2020-01-01 RX ADMIN — HALOPERIDOL LACTATE 2 MG: 5 INJECTION, SOLUTION INTRAMUSCULAR at 05:24

## 2020-01-01 RX ADMIN — CEFTRIAXONE 1 G: 1 INJECTION, POWDER, FOR SOLUTION INTRAMUSCULAR; INTRAVENOUS at 17:10

## 2020-01-01 RX ADMIN — ATORVASTATIN CALCIUM 10 MG: 10 TABLET, FILM COATED ORAL at 22:03

## 2020-01-01 RX ADMIN — METRONIDAZOLE 500 MG: 250 TABLET ORAL at 16:04

## 2020-01-01 RX ADMIN — HALOPERIDOL LACTATE 2 MG: 5 INJECTION, SOLUTION INTRAMUSCULAR at 10:06

## 2020-01-01 RX ADMIN — ASPIRIN 81 MG 81 MG: 81 TABLET ORAL at 10:07

## 2020-01-01 RX ADMIN — MORPHINE SULFATE 4 MG: 4 INJECTION INTRAVENOUS at 08:34

## 2020-01-01 RX ADMIN — HALOPERIDOL LACTATE 2 MG: 5 INJECTION, SOLUTION INTRAMUSCULAR at 08:07

## 2020-01-01 RX ADMIN — MORPHINE SULFATE 4 MG: 4 INJECTION INTRAVENOUS at 16:38

## 2020-01-01 RX ADMIN — MORPHINE SULFATE 4 MG: 4 INJECTION INTRAVENOUS at 08:08

## 2020-01-01 RX ADMIN — MORPHINE SULFATE 2 MG: 2 INJECTION, SOLUTION INTRAMUSCULAR; INTRAVENOUS at 05:23

## 2020-01-01 RX ADMIN — METRONIDAZOLE 500 MG: 250 TABLET ORAL at 08:07

## 2020-01-01 RX ADMIN — HALOPERIDOL LACTATE 2 MG: 5 INJECTION, SOLUTION INTRAMUSCULAR at 16:05

## 2020-01-01 RX ADMIN — LORATADINE 10 MG: 10 TABLET ORAL at 09:18

## 2020-01-01 RX ADMIN — MORPHINE SULFATE 4 MG: 4 INJECTION INTRAVENOUS at 16:31

## 2020-01-01 RX ADMIN — AMLODIPINE BESYLATE 5 MG: 5 TABLET ORAL at 10:10

## 2020-01-01 RX ADMIN — MONTELUKAST 10 MG: 10 TABLET, FILM COATED ORAL at 08:30

## 2020-01-01 RX ADMIN — METRONIDAZOLE 500 MG: 250 TABLET ORAL at 17:21

## 2020-01-01 RX ADMIN — SODIUM CHLORIDE, PRESERVATIVE FREE 3 ML: 5 INJECTION INTRAVENOUS at 20:49

## 2020-01-01 RX ADMIN — LORATADINE 10 MG: 10 TABLET ORAL at 08:30

## 2020-01-01 RX ADMIN — MORPHINE SULFATE 2 MG: 2 INJECTION, SOLUTION INTRAMUSCULAR; INTRAVENOUS at 05:36

## 2020-01-01 RX ADMIN — LISINOPRIL 20 MG: 20 TABLET ORAL at 10:08

## 2020-01-01 RX ADMIN — MORPHINE SULFATE 2 MG: 2 INJECTION, SOLUTION INTRAMUSCULAR; INTRAVENOUS at 01:30

## 2020-01-01 RX ADMIN — MORPHINE SULFATE 4 MG: 4 INJECTION INTRAVENOUS at 01:49

## 2020-01-01 RX ADMIN — Medication 10 ML: at 21:51

## 2020-01-01 RX ADMIN — METRONIDAZOLE 500 MG: 250 TABLET ORAL at 07:36

## 2020-01-01 RX ADMIN — MICONAZOLE NITRATE: 20 CREAM TOPICAL at 15:00

## 2020-01-01 RX ADMIN — Medication 10 ML: at 05:20

## 2020-01-01 RX ADMIN — HALOPERIDOL LACTATE 2 MG: 5 INJECTION, SOLUTION INTRAMUSCULAR at 20:49

## 2020-01-01 RX ADMIN — MORPHINE SULFATE 4 MG: 4 INJECTION INTRAVENOUS at 17:20

## 2020-01-01 RX ADMIN — SODIUM CHLORIDE, PRESERVATIVE FREE 3 ML: 5 INJECTION INTRAVENOUS at 08:13

## 2020-01-01 RX ADMIN — HALOPERIDOL LACTATE 2 MG: 5 INJECTION, SOLUTION INTRAMUSCULAR at 22:30

## 2020-01-01 RX ADMIN — METRONIDAZOLE 500 MG: 250 TABLET ORAL at 15:30

## 2020-01-01 RX ADMIN — SODIUM CHLORIDE, PRESERVATIVE FREE 3 ML: 5 INJECTION INTRAVENOUS at 16:33

## 2020-01-01 RX ADMIN — ATORVASTATIN CALCIUM 10 MG: 10 TABLET, FILM COATED ORAL at 22:39

## 2020-01-01 RX ADMIN — CEFTRIAXONE 1 G: 1 INJECTION, POWDER, FOR SOLUTION INTRAMUSCULAR; INTRAVENOUS at 17:17

## 2020-01-01 RX ADMIN — SODIUM CHLORIDE 100 ML/HR: 900 INJECTION, SOLUTION INTRAVENOUS at 17:16

## 2020-01-01 RX ADMIN — AMLODIPINE BESYLATE 5 MG: 5 TABLET ORAL at 08:06

## 2020-01-01 RX ADMIN — SODIUM CHLORIDE, PRESERVATIVE FREE 3 ML: 5 INJECTION INTRAVENOUS at 05:35

## 2020-01-01 RX ADMIN — LORATADINE 10 MG: 10 TABLET ORAL at 10:09

## 2020-01-01 RX ADMIN — HALOPERIDOL LACTATE 2 MG: 5 INJECTION, SOLUTION INTRAMUSCULAR at 03:33

## 2020-01-01 RX ADMIN — HALOPERIDOL LACTATE 2 MG: 5 INJECTION, SOLUTION INTRAMUSCULAR at 07:17

## 2020-01-01 RX ADMIN — HALOPERIDOL LACTATE 2 MG: 5 INJECTION, SOLUTION INTRAMUSCULAR at 05:51

## 2020-01-01 RX ADMIN — MORPHINE SULFATE 4 MG: 4 INJECTION INTRAVENOUS at 15:03

## 2020-01-01 RX ADMIN — LISINOPRIL 20 MG: 20 TABLET ORAL at 09:18

## 2020-01-01 RX ADMIN — METRONIDAZOLE 500 MG: 250 TABLET ORAL at 08:11

## 2020-01-01 RX ADMIN — MORPHINE SULFATE 2 MG: 2 INJECTION, SOLUTION INTRAMUSCULAR; INTRAVENOUS at 05:41

## 2020-01-01 RX ADMIN — HALOPERIDOL LACTATE 2 MG: 5 INJECTION, SOLUTION INTRAMUSCULAR at 15:04

## 2020-01-01 RX ADMIN — LISINOPRIL 20 MG: 20 TABLET ORAL at 08:31

## 2020-01-01 RX ADMIN — MORPHINE SULFATE 4 MG: 4 INJECTION INTRAVENOUS at 08:07

## 2020-01-01 RX ADMIN — POTASSIUM CHLORIDE 20 MEQ: 14.9 INJECTION INTRAVENOUS at 05:04

## 2020-01-01 RX ADMIN — MORPHINE SULFATE 4 MG: 4 INJECTION INTRAVENOUS at 16:57

## 2020-01-01 RX ADMIN — Medication: at 16:06

## 2020-01-01 RX ADMIN — Medication 1 EACH: at 04:49

## 2020-01-01 RX ADMIN — MORPHINE SULFATE 2 MG: 2 INJECTION, SOLUTION INTRAMUSCULAR; INTRAVENOUS at 17:20

## 2020-01-01 RX ADMIN — MORPHINE SULFATE 4 MG: 4 INJECTION INTRAVENOUS at 08:47

## 2020-01-01 RX ADMIN — MORPHINE SULFATE 2 MG: 2 INJECTION, SOLUTION INTRAMUSCULAR; INTRAVENOUS at 10:06

## 2020-01-01 RX ADMIN — HALOPERIDOL LACTATE 2 MG: 5 INJECTION, SOLUTION INTRAMUSCULAR at 08:34

## 2020-01-01 RX ADMIN — Medication: at 16:32

## 2020-01-01 RX ADMIN — HALOPERIDOL LACTATE 2 MG: 5 INJECTION, SOLUTION INTRAMUSCULAR at 12:55

## 2020-01-01 RX ADMIN — MORPHINE SULFATE 2 MG: 2 INJECTION, SOLUTION INTRAMUSCULAR; INTRAVENOUS at 19:17

## 2020-01-01 RX ADMIN — MORPHINE SULFATE 2 MG: 2 INJECTION, SOLUTION INTRAMUSCULAR; INTRAVENOUS at 07:54

## 2020-01-01 RX ADMIN — MORPHINE SULFATE 2 MG: 2 INJECTION, SOLUTION INTRAMUSCULAR; INTRAVENOUS at 08:56

## 2020-01-01 RX ADMIN — MORPHINE SULFATE 2 MG: 2 INJECTION, SOLUTION INTRAMUSCULAR; INTRAVENOUS at 04:02

## 2020-01-01 RX ADMIN — HALOPERIDOL LACTATE 2 MG: 5 INJECTION, SOLUTION INTRAMUSCULAR at 20:07

## 2020-01-01 RX ADMIN — MONTELUKAST 10 MG: 10 TABLET, FILM COATED ORAL at 08:06

## 2020-01-01 RX ADMIN — MORPHINE SULFATE 2 MG: 2 INJECTION, SOLUTION INTRAMUSCULAR; INTRAVENOUS at 10:44

## 2020-01-01 RX ADMIN — HALOPERIDOL LACTATE 2 MG: 5 INJECTION, SOLUTION INTRAMUSCULAR at 02:47

## 2020-01-01 RX ADMIN — MORPHINE SULFATE 2 MG: 2 INJECTION, SOLUTION INTRAMUSCULAR; INTRAVENOUS at 05:01

## 2020-01-01 RX ADMIN — HALOPERIDOL LACTATE 2 MG: 5 INJECTION, SOLUTION INTRAMUSCULAR at 23:52

## 2020-01-01 RX ADMIN — MORPHINE SULFATE 2 MG: 2 INJECTION, SOLUTION INTRAMUSCULAR; INTRAVENOUS at 12:55

## 2020-01-01 RX ADMIN — METRONIDAZOLE 500 MG: 250 TABLET ORAL at 00:44

## 2020-01-01 RX ADMIN — MONTELUKAST 10 MG: 10 TABLET, FILM COATED ORAL at 10:07

## 2020-01-01 RX ADMIN — MORPHINE SULFATE 2 MG: 2 INJECTION, SOLUTION INTRAMUSCULAR; INTRAVENOUS at 05:15

## 2020-01-01 RX ADMIN — HALOPERIDOL LACTATE 2 MG: 5 INJECTION, SOLUTION INTRAMUSCULAR at 19:17

## 2020-01-01 RX ADMIN — MORPHINE SULFATE 4 MG: 4 INJECTION INTRAVENOUS at 15:14

## 2020-01-01 RX ADMIN — MONTELUKAST 10 MG: 10 TABLET, FILM COATED ORAL at 09:18

## 2020-01-01 RX ADMIN — MORPHINE SULFATE 4 MG: 4 INJECTION INTRAVENOUS at 01:28

## 2020-01-01 RX ADMIN — GLYCOPYRROLATE 0.2 MG: 0.2 INJECTION, SOLUTION INTRAMUSCULAR; INTRAVENOUS at 02:46

## 2020-01-01 RX ADMIN — MORPHINE SULFATE 2 MG: 2 INJECTION, SOLUTION INTRAMUSCULAR; INTRAVENOUS at 03:33

## 2020-01-01 RX ADMIN — HALOPERIDOL LACTATE 2 MG: 5 INJECTION, SOLUTION INTRAMUSCULAR at 08:48

## 2020-01-01 RX ADMIN — HALOPERIDOL LACTATE 2 MG: 5 INJECTION, SOLUTION INTRAMUSCULAR at 07:49

## 2020-01-01 RX ADMIN — MORPHINE SULFATE 2 MG: 2 INJECTION, SOLUTION INTRAMUSCULAR; INTRAVENOUS at 21:14

## 2020-01-01 RX ADMIN — HALOPERIDOL LACTATE 2 MG: 5 INJECTION, SOLUTION INTRAMUSCULAR at 23:00

## 2020-01-01 RX ADMIN — HALOPERIDOL LACTATE 2 MG: 5 INJECTION, SOLUTION INTRAMUSCULAR at 09:17

## 2020-01-01 RX ADMIN — MORPHINE SULFATE 4 MG: 4 INJECTION INTRAVENOUS at 06:16

## 2020-01-01 RX ADMIN — HALOPERIDOL LACTATE 2 MG: 5 INJECTION, SOLUTION INTRAMUSCULAR at 16:31

## 2020-01-01 RX ADMIN — HALOPERIDOL LACTATE 2 MG: 5 INJECTION, SOLUTION INTRAMUSCULAR at 01:49

## 2020-01-01 RX ADMIN — MORPHINE SULFATE 2 MG: 2 INJECTION, SOLUTION INTRAMUSCULAR; INTRAVENOUS at 05:29

## 2020-01-01 RX ADMIN — MORPHINE SULFATE 4 MG: 4 INJECTION INTRAVENOUS at 13:37

## 2020-01-01 RX ADMIN — ASPIRIN 81 MG 81 MG: 81 TABLET ORAL at 08:06

## 2020-01-01 RX ADMIN — Medication 5 ML: at 07:02

## 2020-01-01 RX ADMIN — HALOPERIDOL LACTATE 2 MG: 5 INJECTION, SOLUTION INTRAMUSCULAR at 05:01

## 2020-01-01 RX ADMIN — MORPHINE SULFATE 2 MG: 2 INJECTION, SOLUTION INTRAMUSCULAR; INTRAVENOUS at 07:30

## 2020-01-01 RX ADMIN — MORPHINE SULFATE 2 MG: 2 INJECTION, SOLUTION INTRAMUSCULAR; INTRAVENOUS at 19:12

## 2020-01-01 RX ADMIN — ACETAMINOPHEN 650 MG: 325 TABLET, FILM COATED ORAL at 08:06

## 2020-01-01 RX ADMIN — MORPHINE SULFATE 4 MG: 4 INJECTION INTRAVENOUS at 08:26

## 2020-01-01 RX ADMIN — TUBERCULIN PURIFIED PROTEIN DERIVATIVE 5 UNITS: 5 INJECTION, SOLUTION INTRADERMAL at 23:48

## 2020-01-01 RX ADMIN — LISINOPRIL 20 MG: 20 TABLET ORAL at 08:06

## 2020-01-01 RX ADMIN — MORPHINE SULFATE 2 MG: 2 INJECTION, SOLUTION INTRAMUSCULAR; INTRAVENOUS at 04:50

## 2020-01-01 RX ADMIN — MORPHINE SULFATE 2 MG: 2 INJECTION, SOLUTION INTRAMUSCULAR; INTRAVENOUS at 05:35

## 2020-01-01 RX ADMIN — POTASSIUM CHLORIDE 20 MEQ: 14.9 INJECTION, SOLUTION INTRAVENOUS at 07:01

## 2020-08-27 PROBLEM — F03.90 DEMENTIA (HCC): Status: ACTIVE | Noted: 2020-01-01

## 2020-08-27 PROBLEM — M25.552 LEFT HIP PAIN: Status: ACTIVE | Noted: 2020-01-01

## 2020-08-27 PROBLEM — M51.36 DDD (DEGENERATIVE DISC DISEASE), LUMBAR: Chronic | Status: ACTIVE | Noted: 2020-01-01

## 2020-08-27 PROBLEM — N17.9 AKI (ACUTE KIDNEY INJURY) (HCC): Status: ACTIVE | Noted: 2020-01-01

## 2020-08-27 PROBLEM — D72.829 LEUKOCYTOSIS: Status: ACTIVE | Noted: 2020-01-01

## 2020-08-27 PROBLEM — R53.81 DEBILITY: Chronic | Status: ACTIVE | Noted: 2020-01-01

## 2020-08-27 PROBLEM — R63.4 UNINTENTIONAL WEIGHT LOSS: Chronic | Status: ACTIVE | Noted: 2020-01-01

## 2020-08-27 PROBLEM — I10 HYPERTENSION: Chronic | Status: ACTIVE | Noted: 2020-01-01

## 2020-08-27 PROBLEM — R63.0 ANOREXIA: Chronic | Status: ACTIVE | Noted: 2020-01-01

## 2020-08-27 PROBLEM — R73.9 HYPERGLYCEMIA: Status: ACTIVE | Noted: 2020-01-01

## 2020-08-27 PROBLEM — M25.552 LEFT HIP PAIN: Chronic | Status: ACTIVE | Noted: 2020-01-01

## 2020-08-27 PROBLEM — E86.0 DEHYDRATION: Status: ACTIVE | Noted: 2020-01-01

## 2020-08-27 PROBLEM — N39.0 ACUTE UTI: Status: ACTIVE | Noted: 2020-01-01

## 2020-08-27 NOTE — PROGRESS NOTES
UMER met with the patient and her  Dr. Luis Powers. Doriseva Delma states that he and the patient live together and have celebrated [de-identified] years of marriage recently. Patient has local family and good support at home per Serjio Nguyễn. The patient does not require assistive devices at her baseline, but recently has required more assistance. She has a walker, cane, and wheelchair at home. She requires ADL assistance and has suffered some falls. She is seen by Dr. Shaneka Carmona for primary care. [facility] discussed [facility] services, spouse receptive. Preference is for [facility]. Referral sent for PT/OT/RN/CNA. SOFIA Young  [facility] * [email or web address]   
( [phone]

## 2020-08-27 NOTE — ED PROVIDER NOTES
Patient to ER with her  via EMS, has been concerned with decreased ability to get out of bed for the past week and 1/2 to 2 weeks. States patient has been complaining of left hip pain. Also has some increased in appetite, will not get out of bed, she may be getting dehydrated. Manish Retana He has not noticed any obvious fever no diarrhea or constipation. Does have dementia and  been sleeping a lot more lately The history is provided by the spouse. Hip Pain This is a new problem. The current episode started more than 1 week ago. The problem occurs constantly. The problem has not changed since onset. The pain is present in the left hip. The quality of the pain is described as aching. The pain is moderate. The symptoms are aggravated by activity and palpation. Treatments tried: tylenol  The treatment provided no relief. There has been no history of extremity trauma. Past Medical History:  
Diagnosis Date  Arthritis  Chronic pain   
 back  Hypertension No past surgical history on file. No family history on file. Social History Socioeconomic History  Marital status:  Spouse name: Not on file  Number of children: Not on file  Years of education: Not on file  Highest education level: Not on file Occupational History  Not on file Social Needs  Financial resource strain: Not on file  Food insecurity Worry: Not on file Inability: Not on file  Transportation needs Medical: Not on file Non-medical: Not on file Tobacco Use  Smoking status: Not on file Substance and Sexual Activity  Alcohol use: Not on file  Drug use: Not on file  Sexual activity: Not on file Lifestyle  Physical activity Days per week: Not on file Minutes per session: Not on file  Stress: Not on file Relationships  Social connections Talks on phone: Not on file Gets together: Not on file Attends [employer] service: Not on file Active member of club or organization: Not on file Attends meetings of clubs or organizations: Not on file Relationship status: Not on file  Intimate partner violence Fear of current or ex partner: Not on file Emotionally abused: Not on file Physically abused: Not on file Forced sexual activity: Not on file Other Topics Concern  Not on file Social History Narrative  Not on file ALLERGIES: Keflex [cephalexin]; Naproxen; Niacin; and Tolectin [tolmetin] Review of Systems Unable to perform ROS: Dementia Vitals:  
 08/27/20 1213 08/27/20 1216 BP: 183/76 Pulse: 80 Resp: 16 Temp: 97.7 °F (36.5 °C) SpO2: 95% Weight:  67.6 kg (149 lb) Height:  5' 2\" (1.575 m) Physical Exam 
Vitals signs and nursing note reviewed. Constitutional:   
   General: She is not in acute distress. Appearance: Normal appearance. She is well-developed. She is not diaphoretic. HENT:  
   Head: Normocephalic and atraumatic. Right Ear: External ear normal.  
   Left Ear: External ear normal.  
   Mouth/Throat:  
   Mouth: Mucous membranes are moist.  
Eyes:  
   Pupils: Pupils are equal, round, and reactive to light. Neck: Musculoskeletal: Normal range of motion and neck supple. No muscular tenderness. Cardiovascular:  
   Rate and Rhythm: Normal rate and regular rhythm. Pulmonary:  
   Effort: Pulmonary effort is normal.  
   Breath sounds: Normal breath sounds. No wheezing or rhonchi. Abdominal:  
   General: Abdomen is flat. Bowel sounds are normal.  
   Palpations: Abdomen is soft. Tenderness: There is no abdominal tenderness. Hernia: No hernia is present. Musculoskeletal: Normal range of motion. General: Tenderness present. No swelling. Comments: Pain to palpation left lateral hip area,no skin changes, no pain to rotation, no pedal edema Skin: 
 General: Skin is warm. Findings: No rash. Neurological:  
   General: No focal deficit present. Mental Status: She is alert and oriented to person, place, and time. Mental status is at baseline. Psychiatric:     
   Mood and Affect: Mood normal.  
 
  
 
MDM Number of Diagnoses or Management Options Diagnosis management comments: Wbc 15k, cmp with bun 36, cr 1.32, left hip and chest x rays negative Cath urine >100 wbc Uti, dehydration, will give fluids, abx, discussed with Dr Armani Dior, will  Discuss with hosptialist for possible  admission Amount and/or Complexity of Data Reviewed Clinical lab tests: ordered and reviewed Tests in the radiology section of CPT®: ordered and reviewed Review and summarize past medical records: yes Discuss the patient with other providers: yes Risk of Complications, Morbidity, and/or Mortality Presenting problems: moderate Diagnostic procedures: moderate Management options: moderate Patient Progress Patient progress: improved Procedures

## 2020-08-27 NOTE — ED TRIAGE NOTES
Pt from home by EMS, pt has hx of dementia, pts family states pt developed left hip pain X 2 weeks ago, no known injury. Pt at her baseline mentation. Pts spouse at bedside. Mask in use.

## 2020-08-27 NOTE — PROGRESS NOTES
Attending provider anticipates hospital admit. Home health referral on hold. SOFIA Castellanos  [address] * [email or web address]   
( [phone]

## 2020-08-27 NOTE — H&P
Hospitalist Admission History and Physical  
 
CHIEF COMPLAINT:  Left hip pain. Subjective:  
Patient is a pleasantly demented  80 y.o.  female who presents to ER today in the company of her  who reports 1 week history of being unable to ambulate without assistance and intermittently complaining of left hip pain. She has not fallen, nor has she struck the hip. He states she has had a shuffling gait for about a month. She had a right hip fracture a few years ago sustained in a fall with ORIF. He is not aware of history of osteoporosis. She has no left leg rotation, negative pelvis rock and does not seem to be in pain when the hip is manipulated. He also reports anorexia and not eating much for about a month with unknown weight loss. She has also been sleeping more than usual for about a month. He has two sons who live nearby with their wives, all of whom assist with her care when possible. She is found with a UTI, FRED, dehydration and WBC of 15.5. Lactic acid: 1.1She also has known DDD and has been walking bent forward for quite some time with difficulty straightening back on command. She verbalizes infrequently, however speech is clear when she does with inability to respond appropriately to conversation. She has no additional symptoms, fever, GI upset. Does not complain of any additional pain. Admitting to floor with IVF, urine culture pending, PT, OT consults. Will obtain MRI of hip if needed. Past Medical History:  
Diagnosis Date  Arthritis  Chronic pain   
 back  Debility 8/27/2020  Dementia ([facility] [address] 8/27/2020 8/27/20:  RARELY SPEAKS. SHUFFLING X 1 MONTH  
 Hypertension SURGICAL HISTORY:   reports none. Family history:  Heart disease and stroke 2 sons healthy. Social History Social History Narrative 8/27/20:  PATIENT HAS BEEN  FOR >60 YEARS. TWO GROWN SONS LIVING LOCALLY.    IS PRIMARY CARETAKER  
  
 
 Social History Substance and Sexual Activity Drug Use Never Allergies Allergen Reactions  Niacin Unknown (comments)  Tolectin [Tolmetin] Unknown (comments)  Naproxen Other (comments) GI IRRITATION Prior to Admission medications Medication Sig Start Date End Date Taking? Authorizing Provider Calcium-Cholecalciferol, D3, (CALCIUM 600 WITH VITAMIN D3) 600 mg(1,500mg) -400 unit cap Take  by mouth two (2) times a day. Other, MD Ferdinand  
multivitamin (ONE A DAY) tablet Take 1 Tab by mouth daily. Other, MD Ferdinand  
traMADol (ULTRAM) 50 mg tablet Take 50 mg by mouth every six (6) hours as needed. Provider, Historical  
amlodipine (NORVASC) 5 mg tablet Take 5 mg by mouth daily. Provider, Historical  
benazepril (LOTENSIN) 40 mg tablet Take 40 mg by mouth daily. Provider, Historical  
meloxicam (MOBIC) 7.5 mg tablet Take  by mouth two (2) times a day. Provider, Historical  
simvastatin (ZOCOR) 10 mg tablet Take  by mouth nightly. Provider, Historical  
 
HOME MEDS REVIEWED AND RECONCILED BY ME  
 
PHP:  Dana Grimm MD 
 
Review of Systems Patient is unable Objective:  
 
Visit Vitals /76 Pulse 80 Temp 97.7 °F (36.5 °C) Resp 16 Ht 5' 2\" (1.575 m) Wt 67.6 kg (149 lb) SpO2 95% BMI 27.25 kg/m² PHYSICAL EXAM: 
General appearance: Alert and awake. Frail appearing. Follows some commands. Oriented to self. Cooperative, denies pain. Minimal verbalization, clear speech not relevant to the conversation. Seems oblivious to surroundings, recognizes . Clean and neat. Head: Normocephalic, without obvious abnormality, atraumatic Eyes: conjunctivae/corneas clear. PERRL Throat: Lips, mucosa, and tongue dry. Teeth and gums normal 
Neck: supple, symmetrical, trachea midline, no adenopathy, thyroid: not enlarged, symmetric, no tenderness/mass/nodules, no carotid bruit and no JVD Lungs: clear to auscultation bilaterally Heart: regular rate and rhythm, S1, S2 normal, no murmur, click, rub or gallop Abdomen: soft, non-tender. Bowel sounds normal. No masses,  no organomegaly. Negative pelvic rock. Extremities: All extremities normal, atraumatic, no cyanosis or edema,  
Skin: Skin color, texture, turgor dry, otherwise normal. No rashes or lesions Neurologic: Grossly normal, generalized weakness, no unilateral deficit. Data Review:  
Recent Results (from the past 24 hour(s)) CBC WITH AUTOMATED DIFF Collection Time: 08/27/20  1:02 PM  
Result Value Ref Range WBC 15.5 (H) 4.3 - 11.1 K/uL  
 RBC 4.07 4.05 - 5.2 M/uL  
 HGB 12.8 11.7 - 15.4 g/dL HCT 38.5 35.8 - 46.3 % MCV 94.6 79.6 - 97.8 FL  
 MCH 31.4 26.1 - 32.9 PG  
 MCHC 33.2 31.4 - 35.0 g/dL  
 RDW 14.3 11.9 - 14.6 % PLATELET 203 579 - 055 K/uL MPV 10.1 9.4 - 12.3 FL ABSOLUTE NRBC 0.00 0.0 - 0.2 K/uL  
 DF AUTOMATED NEUTROPHILS 81 (H) 43 - 78 % LYMPHOCYTES 13 13 - 44 % MONOCYTES 5 4.0 - 12.0 % EOSINOPHILS 1 0.5 - 7.8 % BASOPHILS 1 0.0 - 2.0 % IMMATURE GRANULOCYTES 0 0.0 - 5.0 %  
 ABS. NEUTROPHILS 12.5 (H) 1.7 - 8.2 K/UL  
 ABS. LYMPHOCYTES 2.0 0.5 - 4.6 K/UL  
 ABS. MONOCYTES 0.8 0.1 - 1.3 K/UL  
 ABS. EOSINOPHILS 0.1 0.0 - 0.8 K/UL  
 ABS. BASOPHILS 0.1 0.0 - 0.2 K/UL  
 ABS. IMM. GRANS. 0.1 0.0 - 0.5 K/UL METABOLIC PANEL, COMPREHENSIVE Collection Time: 08/27/20  1:02 PM  
Result Value Ref Range Sodium 143 136 - 145 mmol/L Potassium 3.6 3.5 - 5.1 mmol/L Chloride 111 (H) 98 - 107 mmol/L  
 CO2 28 21 - 32 mmol/L Anion gap 4 (L) 7 - 16 mmol/L Glucose 121 (H) 65 - 100 mg/dL BUN 36 (H) 8 - 23 MG/DL Creatinine 1.32 (H) 0.6 - 1.0 MG/DL  
 GFR est AA 49 (L) >60 ml/min/1.73m2 GFR est non-AA 40 (L) >60 ml/min/1.73m2 Calcium 9.6 8.3 - 10.4 MG/DL Bilirubin, total 0.5 0.2 - 1.1 MG/DL  
 ALT (SGPT) 20 12 - 65 U/L  
 AST (SGOT) 62 (H) 15 - 37 U/L Alk.  phosphatase 47 (L) 50 - 130 U/L  
 Protein, total 7.3 6.3 - 8.2 g/dL Albumin 3.9 3.2 - 4.6 g/dL Globulin 3.4 2.3 - 3.5 g/dL A-G Ratio 1.1 (L) 1.2 - 3.5 LIPASE Collection Time: 08/27/20  1:02 PM  
Result Value Ref Range Lipase 192 73 - 393 U/L  
URINE MICROSCOPIC Collection Time: 08/27/20  1:11 PM  
Result Value Ref Range WBC >100 (H) 0 /hpf  
 RBC 20-50 0 /hpf Epithelial cells 0-3 0 /hpf Bacteria 4+ (H) 0 /hpf Casts 0-3 0 /lpf Mucus 0 0 /lpf Yeast MODERATE    
LACTIC ACID Collection Time: 08/27/20  2:35 PM  
Result Value Ref Range Lactic acid 1.1 0.4 - 2.0 MMOL/L  
 
URINE CULTURE PENDING  
 
CXR:  No acute findings. LEFT HIP XRAY:  Lower lumbar degenerative spondylosis is present. Degenerative joint disease is present in the SI joints. The femoral heads are symmetric in contour and density. There is no displaced fracture, malalignment or advanced degenerative change in the left hip. IMPRESSION: No acute findings. Old records reviewed. Assessment/Plan:  
Acute UTI Begin rocephin IVF x 24 hours, then re-jessica  
 Culture pending Anorexia with unknown weight loss Nutrition consult Supplements Unintentional weight loss:  Unclear how much As above. Advanced dementia:  High degree of suspicion this is the cause for most of her current problems. Intermittent complaints of left hip pain without trauma or fall. Consider MRI if not improving a a couple for days  does not feel this is necessary at the moment PT/OT consults for eval. tomorrow. DDD, lumbar:  Possible causation of hip pain. Debility:   
 Place PPD in case rehab may be beneficial  
 PT/OT/CM consults Fall precautions FRED due to dehydration Normal saline at 125 ml/hr x 24 hours, then re-eval  
 Daily labs, add mag in am  
 
Leukocytosis:  Dehydration and FRED Monitor Hyperglycemia:  A1C in am  
 
Hypertension:  On home meds Signed By: Marilee Gramajo NP   
 August 27, 2020

## 2020-08-27 NOTE — ED NOTES
[facility] Ass[facility] patient care at this [facility].

## 2020-08-28 NOTE — PROGRESS NOTES
Care Management Interventions PCP Verified by CM: Yes Physical Therapy Consult: Yes Current Support Network: Lives with Spouse Discharge Location Discharge Placement: Unable to determine at this time Patient admitted with UTI and hx of  Dementia. Patient lives with spouse. Family report she was independent with ambulation up to two weeks ago. PT reports she now takes two people to stand and they are worried her spouse cannot care for her at home alone. I rec'd call and met in person with a granddaughter-in-law Chelsy Cadet who is visiting from [facility]. She has  two children (one lives in [place] and the other in [place]). They are also worried about Mrs. Estrada Key returning home with the spouse caring for her. They had difficulty getting him to agree to bring her to the hospital. Ms. Estrada Key was the visitor for today and the family member giving all of this information. Spouse is at home today and is Health system and needs d/c planning discussion to be in person. Spouse will be back tomorrow-Sat so we will sit down with him and recommend 24 hr care  and offer  ie short term rehab. .Because of her confusion, family has been encouraging him to sell their home and move them into [facility] care (He is retired professor at [employer]). He has been very resistant to any change. Visiting Jazmin Taylor was to meet with pt/spouse today but all of this was initiated by family. Spouse had agreed to hear their presentation but had not agreed on their services. ER UMER referred pt to [facility] to they are on stand by to assist with care if she goes home.

## 2020-08-28 NOTE — PROGRESS NOTES
Patient in bed. Disoriented, confused, calm, dementia. Lungs clear to auscultation. Hypoactive bowel sounds. Patient denies needs at present. Weakness to all extremities. Palpable pulses. Instructed not to get up by themselves and call for assistance or any needs. Call bell within reach. Side rails up x3. Bed low and locked. No distress noted. Spouse at bedside.

## 2020-08-28 NOTE — PROGRESS NOTES
Problem: Mobility Impaired (Adult and Pediatric) Goal: *Acute Goals and Plan of Care (Insert Text) Description: STG: 
(1.)Ms. Idris Cohen will move from supine to sit and sit to supine  with MINIMAL ASSIST within 4-7 treatment day(s). (2.)Ms. Idris Cohen will transfer from bed to chair and chair to bed with MINIMAL ASSIST using the least restrictive device within 4-7 treatment day(s). (3.)Ms. Idris Cohen will ambulate with MINIMAL ASSIST for 25 feet with the least restrictive device within 4-7 treatment day(s). ________________________________________________________________________________________________ Outcome: Progressing Towards Goal 
  
PHYSICAL THERAPY: Initial Assessment and AM 8/28/2020 INPATIENT: PT Visit Days : 1 [place] / Product Type: Medicare /   
  
NAME/AGE/GENDER: Jenni Barrientos is a 80 y.o. female PRIMARY DIAGNOSIS: Acute UTI [N39.0] <principal problem not specified> <principal problem not specified> 
  
  
ICD-10: Treatment Diagnosis:  
 Generalized Muscle Weakness (M62.81) Difficulty in walking, Not elsewhere classified (R26.2) Precaution/Allergies: 
Niacin; Tolectin [tolmetin]; and Naproxen ASSESSMENT:  
 
Ms. Idris Cohen presents supine on contact. Pt with dementia and does not know where she is. She was admitted with above diagnosis. She lives at home with her spouse and he brought her to the hospital due to increasing weakness per the chart. Pt presents with generalized weakness and decreased independence with functional mobility. She was assist of 2 for bed mobility and sit to stand, she was unable to take any steps. Her brief was soiled with urine and BM. She was assist of 2 to return supine. OT worked on ADL mobility and brief change. Pt with decreased balance and completely unsafe to be up without assist. Would recommend SNF for rehab. This section established at most recent assessment PROBLEM LIST (Impairments causing functional limitations): 
Decreased Strength Decreased ADL/Functional Activities Decreased Transfer Abilities Decreased Ambulation Ability/Technique Decreased Balance Decreased Activity Tolerance INTERVENTIONS PLANNED: (Benefits and precautions of physical therapy have been discussed with the patient.) Balance Exercise Bed Mobility Gait Training Therapeutic Activites Therapeutic Exercise/Strengthening Transfer Training TREATMENT PLAN: Frequency/Duration: daily for duration of hospital stay Rehabilitation Potential For Stated Goals: Good REHAB RECOMMENDATIONS (at time of discharge pending progress):   
Placement: It is my opinion, based on this patient's performance to date, that Ms. Marcelina Hyde may benefit from intensive therapy at a [address] [place] [address] after discharge due to the functional deficits listed above that are likely to improve with skilled rehabilitation and concerns that he/she may be unsafe to be unsupervised at home due to decreased balance, increased weakness . Equipment: To be determined HISTORY:  
History of Present Injury/Illness (Reason for Referral): PER MD NOTE: Patient is a pleasantly demented  80 y.o.  female who presents to ER today in the company of her  who reports 1 week history of being unable to ambulate without assistance and intermittently complaining of left hip pain. She has not fallen, nor has she struck the hip. He states she has had a shuffling gait for about a month. She had a right hip fracture a few years ago sustained in a fall with ORIF. He is not aware of history of osteoporosis. She has no left leg rotation, negative pelvis rock and does not seem to be in pain when the hip is manipulated. He also reports anorexia and not eating much for about a month with unknown weight loss.  She has also been sleeping more than usual for about a month. He has two sons who live nearby with their wives, all of whom assist with her care when possible. She is found with a UTI, FRED, dehydration and WBC of 15.5. Lactic acid: 1.1She also has known DDD and has been walking bent forward for quite some time with difficulty straightening back on command. She verbalizes infrequently, however speech is clear when she does with inability to respond appropriately to conversation. She has no additional symptoms, fever, GI upset. Does not complain of any additional pain. Admitting to floor with IVF, urine culture pending, PT, OT consults. Will obtain MRI of hip if needed. Past Medical History/Comorbidities:  
Ms. Laurie Gómez  has a past medical history of Arthritis, Chronic pain, Debility (8/27/2020), Dementia ([facility] [address] (8/27/2020), and Hypertension. Ms. Laurie Gómez  has no past surgical history on file. Social History/Living Environment:  
Home Environment: Private residence # Steps to Enter: 5 One/Two Story Residence: One story Living Alone: Yes Support Systems: Child(lupe), Spouse/Significant Other/Partner Patient Expects to be Discharged to[de-identified] Private residence Current DME Used/Available at Home: Cane, straight, Grab bars, Walker, rollator Prior Level of Function/Work/Activity: 
Pt living at home with spouse, unsure of her mobility and ADL status due to her dementia Number of Personal Factors/Comorbidities that affect the Plan of Care: 1-2: MODERATE COMPLEXITY EXAMINATION:  
Most Recent Physical Functioning:  
Gross Assessment: 
AROM: Generally decreased, functional 
Strength: Generally decreased, functional 
Coordination: Grossly decreased, non-functional 
         
  
Posture: 
Posture (WDL): Exceptions to [facility] Posture Assessment: Forward head, Kyphosis, Rounded shoulders Balance: 
Sitting: Impaired Sitting - Static: Fair (occasional) Sitting - Dynamic: Poor (constant support) Standing: Pull to stand; With support Bed Mobility: Supine to Sit: Maximum assistance; Total assistance Sit to Supine: Maximum assistance; Total assistance Wheelchair Mobility: 
  
Transfers: 
Sit to Stand: Moderate assistance;Assist x2 Stand to Sit: Moderate assistance;Assist x2 Gait: 
  
   
  
Body Structures Involved: Muscles Body Functions Affected: Movement Related Activities and Participation Affected: Mobility Self Care Number of elements that affect the Plan of Care: 4+: HIGH COMPLEXITY CLINICAL PRESENTATION:  
Presentation: Evolving clinical presentation with changing clinical characteristics: MODERATE COMPLEXITY CLINICAL DECISION MAKIN[address] [place] [id number] AM-PAC 6 Clicks Basic Mobility Inpatient Short Form How much difficulty does the patient currently have. .. Unable A Lot A Little None 1. Turning over in bed (including adjusting bedclothes, sheets and blankets)? [] 1   [] 2   [x] 3   [] 4  
2. Sitting down on and standing up from a chair with arms ( e.g., wheelchair, bedside commode, etc.)   [] 1   [x] 2   [] 3   [] 4  
3. Moving from lying on back to sitting on the side of the bed? [] 1   [x] 2   [] 3   [] 4 How much help from another person does the patient currently need. .. Total A Lot A Little None 4. Moving to and from a bed to a chair (including a wheelchair)? [] 1   [x] 2   [] 3   [] 4  
5. Need to walk in hospital room? [] 1   [x] 2   [] 3   [] 4  
6. Climbing 3-5 steps with a railing? [x] 1   [] 2   [] 3   [] 4  
© , Trustees of [address] [place] [id number], under license to [employer]. All rights reserved Score:  Initial: 12 Most Recent: X (Date: -- ) Interpretation of Tool:  Represents activities that are increasingly more difficult (i.e. Bed mobility, Transfers, Gait). Medical Necessity:    
Patient is expected to demonstrate progress in  
strength, coordination, and functional technique 
 to  
decrease assistance required with functional mobility Saloni Ernandez Reason for Services/Other Comments: Patient continues to require skilled intervention due to Inability to complete functional mobility independently, her dementia could be a barrier to progress Sravanthi Ruiz Use of outcome tool(s) and clinical judgement create a POC that gives a: Questionable prediction of patient's progress: MODERATE COMPLEXITY  
  
 
 
 
TREATMENT:  
(In addition to Assessment/Re-Assessment sessions the following treatments were rendered) Pre-treatment Symptoms/Complaints:  none Pain: Initial: no reports of pain Post Session:  no reports of pain Assessment/Reassessment only, no treatment provided today Braces/Orthotics/Lines/Etc:  
IV Treatment/Session Assessment:   
Response to Treatment:  pt was indifferent Interdisciplinary Collaboration: Occupational Therapist 
Registered Nurse After treatment position/precautions:  
Supine in bed Bed alarm/tab alert on Bed/Chair-wheels locked Call light within reach Compliance with Program/Exercises: Will assess as treatment progresses Recommendations/Intent for next treatment session: \"Next visit will focus on advancements to more challenging activities and reduction in assistance provided\". Total Treatment Duration: PT Patient Time In/Time Out Time In: 1110 Time Out: 1130 Orlinda Sacks, PT

## 2020-08-28 NOTE — PROGRESS NOTES
Comprehensive Nutrition Assessment Type and Reason for Visit: Consult received for General Nutrition Management and Supplement. MST screen for poor po intake prior to admission, weight loss >33#. Nutrition Assessment:  Pt presented with 1 week history of unable to ambulate and c/o L hip pain;  reports she has anorexia ~ 1 month with unknown weight loss. Pt admitted with FRED, dehydration. Pt with history of demential and Hypertension. Pt unable to verbalize food and nutrition history; unable to report po intake at breakfast this am.  Noted 2 unopened bottles of a chocolate flavored nutrition supplement in room. Estimated Daily Nutrient Needs: 
Energy (kcal):  0437-9291 kcal/day (20-25 kcal/kg/BW) Protein (g):  68-81 gm pro/day (1-1.2 gm/kg/BW) Anthropometric Measures: 
· Height:  5' 2\" (157.5 cm) · Current Body Wt:  67.6 kg (149 lb 0.5 oz)  Noted body weight of 149# on 10/2013 from EMR. No weight loss reflected · BMI Category: 27.2  Normal range Nutrition Diagnosis: · Predicted inadequate energy intake related to cognitive or neurological impairment, noted  verbalized anorexia in admission history, as evidenced by poor intake prior to admission Nutrition Interventions:  
Food and/or Nutrient Delivery: Initiate Ensure Enlive 2 X day. Continue Regular diet; modify texture of Regular diet to cut up meat and soft textured foods. Nutrition Education and Counseling: No recommendations at this time Coordination of Nutrition Care: Feeding assistance/environmental change, Other (specify)(spoke with Desirae Contreras RN r/t feeding assistance) Ordered Weight Goals: 
Meet >75% estimated kcal and protein needs within next 7 days Nutrition Monitoring and Evaluation:  
Food/Nutrient Intake Outcomes: Food and nutrient intake, Supplement intake Physical Signs/Symptoms Outcomes: Weight Discharge Planning: Too soon to determine Electronically signed by Mike Slaughter RD on 8/28/2020 at 12:38 PM 
 
Contact: [phone]

## 2020-08-28 NOTE — PROGRESS NOTES
TRANSFER - IN REPORT: 
 
Verbal [facility] Ay[facility] RN(name[facility] for routine progression of care Report consisted of patients Situation, Background, Assessment and  
Recommendations(SBAR). Information from the following report(s) SBAR, Kardex, ED Summary, Procedure Summary, Intake/Output, MAR, Recent Results and Med Rec Status was reviewed with the receiving nurse. Opportunity for questions and clarification was provided. Assessment completed upon patients arrival to unit and care assumed.

## 2020-08-28 NOTE — ED NOTES
TRANSFER - OUT REPORT: 
 
Verbal report given to [place] England on [facility]  being transferred to [address] [place] [address] for routine progression of care Report consisted of patients Situation, Background, Assessment and  
Recommendations(SBAR). Information from the following report(s) SBAR, Kardex, ED Summary, Procedure Summary, Intake/Output, MAR, Recent Results and Med Rec Status was reviewed with the receiving nurse. Lines:  
Peripheral IV 08/27/20 Left Antecubital (Active) Site Assessment Clean, dry, & intact 08/27/20 1343 Phlebitis Assessment 0 08/27/20 1343 Infiltration Assessment 0 08/27/20 1343 Dressing Status Clean, dry, & intact 08/27/20 1343 Opportunity for questions and clarification was provided. Patient transported with: 
 Registered Nurse

## 2020-08-28 NOTE — PROGRESS NOTES
[address] daughter has left for the day. Patient ate half of her dinner. Call bell within reach. Side rails up x3. Bed low and locked. No distress noted.

## 2020-08-28 NOTE — PROGRESS NOTES
Problem: Self Care Deficits Care Plan (Adult) Goal: *Acute Goals and Plan of Care (Insert Text) Description: Description: 1. Patient will perform simple grooming with verbal cues and maximum assistance. 2. Patient will perform self feeding with verbal cues and maximum assistance. 2. Patient will perform toilet transfers to [facility] with minimal assistance. 3. Patient will participate in 30 + minutes of ADL/ therapeutic exercise/therapeutic activity with min rest breaks to increase activity tolerance for self care. 4. Patient will perform ADL functional mobility in room with minimal assistance. Goals to be set for a 3 day trial to assess pt's cognitive status and learn more about pt's baseline of function Outcome: Progressing Towards Goal 
 
 
OCCUPATIONAL THERAPY: Initial Assessment, Daily Note, and AM 8/28/2020 INPATIENT: OT Visit Days: 1 [place] / Product Type: Medicare /  
  
NAME/AGE/GENDER: Tiago Hines is a 80 y.o. female PRIMARY DIAGNOSIS:  Acute UTI [N39.0] <principal problem not specified> <principal problem not specified> 
  
  
ICD-10: Treatment Diagnosis:  
 Generalized Muscle Weakness (M62.81) Other lack of cordination (R27.8) Difficulty in walking, Not elsewhere classified (R26.2) Decreased cognition Precautions/Allergies: 
   Niacin; Tolectin [tolmetin]; and Naproxen ASSESSMENT:  
 
Ms. Pao Wilks presents with above diagnosis and was seen in room with PT present. Pt noted to be very confused with possible significant dementia. Physically the pt seems to be in good condition but does not follow commands and does not seem to understand how to move. OT will follow pt for a trial basis to see how she does and gain more information about her baseline of function and cognition. Pt assisted to edge of bed and stood with assistance. Pt noted to incontinent of bowel and bladder.  Pt returned to supine and was total assistance for rolling and changing brief. Pt left with bed alert on and RN notified that lunch had come and that pt would need assistance with her lunch. This section established at most recent assessment PROBLEM LIST (Impairments causing functional limitations): 
Decreased Strength Decreased ADL/Functional Activities Decreased Transfer Abilities Decreased Ambulation Ability/Technique Decreased Balance Decreased Activity Tolerance Decreased Cognition INTERVENTIONS PLANNED: (Benefits and precautions of occupational therapy have been discussed with the patient.) Activities of daily living training Cognitive training Therapeutic activity Therapeutic exercise TREATMENT PLAN: Frequency/Duration: Follow patient 2 times per week to address above goals. Rehabilitation Potential For Stated Goals:  guarded REHAB RECOMMENDATIONS (at time of discharge pending progress):   
Placement: It is my opinion, based on this patient's performance to date, that Ms. Adri Corcoran may benefit from participating in 1-2 additional therapy sessions in order to continue to assess for rehab potential and then make recommendation for disposition at discharge. Equipment:  
None at this time OCCUPATIONAL PROFILE AND HISTORY:  
History of Present Injury/Illness (Reason for Referral): 
See H&P Past Medical History/Comorbidities:  
Ms. Adri Corcoran  has a past medical history of Arthritis, Chronic pain, Debility (8/27/2020), Dementia ([facility] [address] (8/27/2020), and Hypertension. Ms. Adri Corcoran  has no past surgical history on file. Social History/Living Environment:  
Home Environment: Private residence # Steps to Enter: 5 One/Two Story Residence: One story Living Alone: Yes Support Systems: Child(lupe), Spouse/Significant Other/Partner Patient Expects to be Discharged to[de-identified] Private residence Current DME Used/Available at Home: Cane, straight, Grab bars, Walker, rollator Prior Level of Function/Work/Activity: Unsure of prior level of care Number of Personal Factors/Comorbidities that affect the Plan of Care: Expanded review of therapy/medical records (1-2):  MODERATE COMPLEXITY ASSESSMENT OF OCCUPATIONAL PERFORMANCE[de-identified]  
Activities of Daily Living:  
Basic ADLs (From Assessment) Complex ADLs (From Assessment) Feeding: Total assistance Oral Facial Hygiene/Grooming: Total assistance Bathing: Total assistance Upper Body Dressing: Total assistance Lower Body Dressing: Total assistance Toileting: Total assistance Grooming/Bathing/Dressing Activities of Daily Living Cognitive Retraining Safety/Judgement: Fall prevention;Decreased awareness of environment Functional Transfers Toilet Transfer : Maximum assistance(bed to [facility]) Bed/Mat Mobility Rolling: Maximum assistance Supine to Sit: Total assistance Sit to Supine: Total assistance Sit to Stand: Moderate assistance;Assist x2 Stand to Sit: Moderate assistance;Assist x2 Scooting: Total assistance Most Recent Physical Functioning:  
Gross Assessment: 
  
         
  
Posture: 
Posture (WD): Exceptions to [facility] Posture Assessment: Forward head, Kyphosis, Rounded shoulders Balance: 
Sitting: Impaired Sitting - Static: Fair (occasional) Sitting - Dynamic: Poor (constant support) Standing: Pull to stand; With support Bed Mobility: 
Rolling: Maximum assistance Supine to Sit: Total assistance Sit to Supine: Total assistance Scooting: Total assistance Wheelchair Mobility: 
  
Transfers: 
Sit to Stand: Moderate assistance;Assist x2 Stand to Sit: Moderate assistance;Assist x2 No data found. Mental Status Neurologic State: Alert Orientation Level: Disoriented X4 Cognition: Decreased attention/concentration, No command following Perception: Appears intact Perseveration: Perseverates during conversation Safety/Judgement: Fall prevention, Decreased awareness of environment Physical Skills Involved: 
Balance Strength Activity Tolerance Cognitive Skills Affected (resulting in the inability to perform in a timely and safe manner): 
Immediate Memory Sustained Attention Comprehension Expression Psychosocial Skills Affected: 
Environmental Adaptation Social Interaction Self-Awareness Number of elements that affect the Plan of Care: 5+:  HIGH COMPLEXITY CLINICAL DECISION MAKIN[address] [place] [address] AM-PAC 6 Clicks Daily Activity Inpatient Short Form How much help from another person does the patient currently need. .. Total A Lot A Little None 1. Putting on and taking off regular lower body clothing? [x] 1   [] 2   [] 3   [] 4  
2. Bathing (including washing, rinsing, drying)? [x] 1   [] 2   [] 3   [] 4  
3. Toileting, which includes using toilet, bedpan or urinal?   [x] 1   [] 2   [] 3   [] 4  
4. Putting on and taking off regular upper body clothing? [x] 1   [] 2   [] 3   [] 4  
5. Taking care of personal grooming such as brushing teeth? [x] 1   [] 2   [] 3   [] 4  
6. Eating meals? [x] 1   [] 2   [] 3   [] 4  
© , Trustees of [address] [place] [address], under license to [employer]. All rights reserved Score:  Initial: 6 Most Recent: X (Date: -- ) Interpretation of Tool:  Represents activities that are increasingly more difficult (i.e. Bed mobility, Transfers, Gait). Use of outcome tool(s) and clinical judgement create a POC that gives a: MODERATE COMPLEXITY  
 
 
 
TREATMENT:  
(In addition to Assessment/Re-Assessment sessions the following treatments were rendered) Pre-treatment Symptoms/Complaints:  pt did not seem in pain 
Pain: Initial:  
   Post Session:    
 
Self Care: (10 min): Procedure(s) (per grid) utilized to improve and/or restore self-care/home management as related to bathing and toileting. Required maximal verbal, manual, and   cueing to facilitate activities of daily living skills and compensatory activities. OT evaluation completed Braces/Orthotics/Lines/Etc:  
IV Treatment/Session Assessment:   
Response to Treatment:  pt up in to edge of bed tolerated fair Interdisciplinary Collaboration:  
Physical Therapist 
Occupational Therapist 
Registered Nurse  After treatment position/precautions:  
Supine in bed Bed alarm/tab alert on Bed/Chair-wheels locked Bed in low position Call light within reach RN notified Compliance with Program/Exercises: Will assess as treatment progresses. Recommendations/Intent for next treatment session: \"Next visit will focus on reduction in assistance provided\". Total Treatment Duration: OT Patient Time In/Time Out Time In: 1130 Time Out: 1150 Boris Marmolejo, OT

## 2020-08-28 NOTE — PROGRESS NOTES
Patients /73 at this time. Adam Fritz notified of situation and also that patient has had an elevated BP prior today. No orders received. Zuri Begin to see patient.

## 2020-08-28 NOTE — PROGRESS NOTES
Hospitalist Progress Note Subjective:  
Daily Progress Note: 8/28/2020 6:19 PM 
 
Patient with advanced dementia here w 1 week history left hip pain without trauma. Xray negative, found with UTI, begun on rocephin. Also with anorexia and sharp decline for about a month.   
 
8/28:  Remains unchanged. Niece here from [address] [place] [address], spoke with RN, reports  not able to care for patient at this point. Does not feel dementia is as advanced as it is.  refuses to allow patient to go to rehab, states he is going to get more help at home, however, has not done so to date. SW involved. PT/OT in, patient with complete assist, frail and off balance. Current Facility-Administered Medications Medication Dose Route Frequency  sodium chloride (NS) flush 5-40 mL  5-40 mL IntraVENous Q8H  
 sodium chloride (NS) flush 5-40 mL  5-40 mL IntraVENous PRN  
 cefTRIAXone (ROCEPHIN) 1 g in 0.9% sodium chloride (MBP/ADV) 50 mL  1 g IntraVENous Q24H  
 acetaminophen (TYLENOL) tablet 650 mg  650 mg Oral Q4H PRN  
 ondansetron (ZOFRAN) injection 4 mg  4 mg IntraVENous Q4H PRN  
 docusate sodium (COLACE) capsule 100 mg  100 mg Oral BID PRN  
 tuberculin injection 5 Units  5 Units IntraDERMal ONCE  
 amLODIPine (NORVASC) tablet 5 mg  5 mg Oral DAILY  aspirin chewable tablet 81 mg  81 mg Oral DAILY  lisinopriL (PRINIVIL, ZESTRIL) tablet 20 mg  20 mg Oral DAILY  loratadine (CLARITIN) tablet 10 mg  10 mg Oral DAILY  lidocaine 4 % patch 1 Patch  1 Patch TransDERmal DAILY  montelukast (SINGULAIR) tablet 10 mg  10 mg Oral DAILY  atorvastatin (LIPITOR) tablet 10 mg  10 mg Oral QHS Review of Systems Patient is unable. Objective:  
 
Visit Vitals /73 (BP 1 Location: Right arm, BP Patient Position: At rest) Pulse 83 Temp 98.4 °F (36.9 °C) Resp 16 Ht 5' 2\" (1.575 m) Wt 67.6 kg (149 lb) SpO2 98% BMI 27.25 kg/m² O2 Device: Room air Temp (24hrs), Av.2 °F (36.8 °C), Min:97.5 °F (36.4 °C), Max:98.6 °F (37 °C) 
 
1901 -  0700 In: 1050 [I.V.:1050] Out: - General appearance: Alert and awake. Frail appearing. Follows some commands. Oriented to self. Cooperative, denies pain. Minimal verbalization, clear speech not relevant to the conversation. Seems oblivious to surroundings, recognizes . Clean and neat. Head: Normocephalic, without obvious abnormality, atraumatic Eyes: conjunctivae/corneas clear. PERRL Throat: Lips, mucosa, and tongue dry. Teeth and gums normal 
Neck: supple, symmetrical, trachea midline, no adenopathy, thyroid: not enlarged, symmetric, no tenderness/mass/nodules, no carotid bruit and no JVD Lungs: clear to auscultation bilaterally Heart: regular rate and rhythm, S1, S2 normal, no murmur, click, rub or gallop Abdomen: soft, non-tender. Bowel sounds normal. No masses,  no organomegaly. Negative pelvic rock. Extremities: All extremities normal, atraumatic, no cyanosis or edema,  
Skin: Skin color, texture, turgor dry, otherwise normal. No rashes or lesions Neurologic: Grossly normal, generalized weakness, no unilateral deficit.   
  
Data Review Recent Results (from the past 24 hour(s)) METABOLIC PANEL, BASIC Collection Time: 20  3:59 AM  
Result Value Ref Range Sodium 145 136 - 145 mmol/L Potassium 3.5 3.5 - 5.1 mmol/L Chloride 115 (H) 98 - 107 mmol/L  
 CO2 23 21 - 32 mmol/L Anion gap 7 7 - 16 mmol/L Glucose 103 (H) 65 - 100 mg/dL BUN 30 (H) 8 - 23 MG/DL Creatinine 1.09 (H) 0.6 - 1.0 MG/DL  
 GFR est AA >60 >60 ml/min/1.73m2 GFR est non-AA 50 (L) >60 ml/min/1.73m2 Calcium 8.4 8.3 - 10.4 MG/DL  
CBC WITH AUTOMATED DIFF Collection Time: 20  3:59 AM  
Result Value Ref Range WBC 12.4 (H) 4.3 - 11.1 K/uL  
 RBC 3.50 (L) 4.05 - 5.2 M/uL  
 HGB 10.6 (L) 11.7 - 15.4 g/dL HCT 33.5 (L) 35.8 - 46.3 %  MCV 95.7 79.6 - 97.8 FL  
 MCH 30.3 26.1 - 32.9 PG  
 MCHC 31.6 31.4 - 35.0 g/dL  
 RDW 14.4 11.9 - 14.6 % PLATELET 883 876 - 629 K/uL MPV 10.3 9.4 - 12.3 FL ABSOLUTE NRBC 0.00 0.0 - 0.2 K/uL  
 DF AUTOMATED NEUTROPHILS 76 43 - 78 % LYMPHOCYTES 15 13 - 44 % MONOCYTES 8 4.0 - 12.0 % EOSINOPHILS 1 0.5 - 7.8 % BASOPHILS 1 0.0 - 2.0 % IMMATURE GRANULOCYTES 0 0.0 - 5.0 %  
 ABS. NEUTROPHILS 9.5 (H) 1.7 - 8.2 K/UL  
 ABS. LYMPHOCYTES 1.8 0.5 - 4.6 K/UL  
 ABS. MONOCYTES 0.9 0.1 - 1.3 K/UL  
 ABS. EOSINOPHILS 0.1 0.0 - 0.8 K/UL  
 ABS. BASOPHILS 0.1 0.0 - 0.2 K/UL  
 ABS. IMM. GRANS. 0.0 0.0 - 0.5 K/UL MAGNESIUM Collection Time: 08/28/20  3:59 AM  
Result Value Ref Range Magnesium 2.3 1.8 - 2.4 mg/dL HEMOGLOBIN A1C WITH EAG Collection Time: 08/28/20  3:59 AM  
Result Value Ref Range Hemoglobin A1c 5.6 % Est. average glucose 114 mg/dL CXR:  No acute findings. 
  
LEFT HIP XRAY:  Lower lumbar degenerative spondylosis is present. Degenerative joint disease is present in the SI joints. The femoral heads are symmetric in contour and density. There is no displaced fracture, malalignment or advanced degenerative change in the left hip. IMPRESSION: No acute findings. 
  
8/27:  URINE CULTURE Culture result: Abnormal          Final   
>100,000 COLONIES/mL ESCHERICHIA COLI Susceptibility Escherichia coli Antibiotic  Interpretation  Value  Method  Comment Trimeth-Sulfamethoxa  Susceptible  <=0.5/9.5  ug/mL  ERYN Nitrofurantoin  Susceptible  <=16  ug/mL  REYN Ampicillin ($)  Resistant  >16  ug/mL  ERYN Gentamicin ($)  Susceptible  <=1  ug/mL  ERYN Tobramycin ($)  Susceptible  1  ug/mL  ERYN Ampicillin/sulbactam ($)  Susceptible  8/4  ug/mL  ERYN Cefazolin ($)  Susceptible  <=1  ug/mL  ERYN Ceftriaxone ($)  Susceptible  <=0.5  ug/mL  ERYN Cefepime ($$)  Susceptible  <=0.5  ug/mL  ERYN Piperacillin/Tazobac ($)  Susceptible  <=2/4  ug/mL  ERYN    
 Aztreonam ($$$$)  Susceptible  <=1  ug/mL  ERYN Cefoxitin  Susceptible  <=4  ug/mL  ERYN Assessment/Plan:  
Acute E Coli UTI Continue Rocephin x 3 days minimum, then po if  discharging for total of 7 days.  
              
Anorexia with unknown weight loss Nutrition consult Supplements  
  
  Unintentional weight loss:  Unclear how much As above.  
  
Advanced dementia:  High degree of suspicion this is the cause for most of her current problems.  very reluctant to engage home assist or  
 rehab 
  
Intermittent complaints of left hip pain without trauma or fall. Consider MRI if not improving a a couple for days  does not feel this is necessary at the moment PT/OT consults for eval. tomorrow.  
  
DDD, lumbar:  Possible causation of hip pain.   
  
Debility:   
            Place PPD in case rehab may be beneficial  
            PT/OT/CM consults Fall precautions  
  
FRED due to dehydration Normal saline at 125 ml/hr x 48 hours, resolving Daily labs, add mag in am  
  
Leukocytosis:  Dehydration and FRED Monitor  
  
Hyperglycemia:  A1C WNL 
  
Hypertension:  Home meds Care Plan discussed with:  and RN Signed By: Xavier Cantrell NP August 28, 2020

## 2020-08-28 NOTE — PROGRESS NOTES
Spoke via phone with patient daughter from out of town who is concerned about father not being able to care for patient and would like to speak with . Marilu Cochran was notified and is on phone speaking to family at this time.

## 2020-08-29 NOTE — PROGRESS NOTES
Problem: Falls - Risk of 
Goal: *Absence of Falls Description: Document [place] Fall Risk and appropriate interventions in the flowsheet. Outcome: Progressing Towards Goal 
Note: Fall Risk Interventions: 
Mobility Interventions: Communicate number of staff needed for ambulation/transfer, OT consult for ADLs, Patient to call before getting OOB, PT Consult for mobility concerns, PT Consult for assist device competence, Strengthening exercises (ROM-active/passive), Utilize walker, cane, or other assistive device Mentation Interventions: Door open when patient unattended Elimination Interventions: Call light in reach, Bed/chair exit alarm, Patient to call for help with toileting needs, Toileting schedule/hourly rounds History of Falls Interventions: Consult care management for discharge planning Problem: Patient Education: Go to Patient Education Activity Goal: Patient/Family Education Outcome: Progressing Towards Goal 
  
Problem: Nutrition Deficit Goal: *Optimize nutritional status Outcome: Progressing Towards Goal 
  
Problem: Patient Education: Go to Patient Education Activity Goal: Patient/Family Education Outcome: Progressing Towards Goal 
  
Problem: Patient Education: Go to Patient Education Activity Goal: Patient/Family Education Outcome: Progressing Towards Goal 
  
Problem: Pressure Injury - Risk of 
Goal: *Prevention of pressure injury Description: Document Yeison Scale and appropriate interventions in the flowsheet. Outcome: Progressing Towards Goal 
Note: Pressure Injury Interventions: 
Sensory Interventions: Assess changes in LOC, Assess need for specialty bed, Minimize linen layers, Pressure redistribution bed/mattress (bed type), Turn and reposition approx. every two hours (pillows and wedges if needed) Moisture Interventions: Absorbent underpads, Apply protective barrier, creams and emollients, Check for incontinence Q2 hours and as needed, Maintain skin hydration (lotion/cream), Minimize layers, Moisture barrier Activity Interventions: Increase time out of bed, PT/OT evaluation Mobility Interventions: Pressure redistribution bed/mattress (bed type), HOB 30 degrees or less, PT/OT evaluation Nutrition Interventions: Document food/fluid/supplement intake, Offer support with meals,snacks and hydration Problem: Patient Education: Go to Patient Education Activity Goal: Patient/Family Education Outcome: Progressing Towards Goal

## 2020-08-29 NOTE — PROGRESS NOTES
Problem: Mobility Impaired (Adult and Pediatric) Goal: *Acute Goals and Plan of Care (Insert Text) Description: STG: 
(1.)Ms. Idris Cohen will move from supine to sit and sit to supine  with MINIMAL ASSIST within 4-7 treatment day(s). (2.)Ms. Idris Cohen will transfer from bed to chair and chair to bed with MINIMAL ASSIST using the least restrictive device within 4-7 treatment day(s). (3.)Ms. Idris Cohen will ambulate with MINIMAL ASSIST for 25 feet with the least restrictive device within 4-7 treatment day(s). ________________________________________________________________________________________________ Outcome: Progressing Towards Goal 
  
PHYSICAL THERAPY: Daily Note and AM 8/29/2020 INPATIENT: PT Visit Days : 2 [place] / [place] / Product Type: Medicare /   
  
NAME/AGE/GENDER: Jenni Barrientos is a 80 y.o. female PRIMARY DIAGNOSIS: Acute UTI [N39.0] <principal problem not specified> <principal problem not specified> 
 
  
ICD-10: Treatment Diagnosis:  
 · Generalized Muscle Weakness (M62.81) · Difficulty in walking, Not elsewhere classified (R26.2) Precaution/Allergies: 
Niacin; Tolectin [tolmetin]; and Naproxen ASSESSMENT:  
 
Ms. Idris Cohen presents supine on contact. Pt with dementia and does not know where she is. She was admitted with above diagnosis. She lives at home with her spouse and he brought her to the hospital due to increasing weakness per the chart. Pt presents with generalized weakness and decreased independence with functional mobility. She was assist of 2 for bed mobility and sit to stand, she was unable to take any steps. Her brief was soiled with urine and BM. She was assist of 2 to return supine. OT worked on ADL mobility and brief change. Pt with decreased balance and completely unsafe to be up without assist. Would recommend SNF for rehab.  
8/29- pt supine on contact. Was able to say her first name when asked. Co-treat with OT. Told pt we were going to help her get up and she said OK. We worked on bed mobility, pt made efforts to attempt to move but unable to move without total assist. Worked on sitting balance on the side of the bed. Attempted sit to stand, worked on weight shifting and moving her feet by the bed, legs started to slide out in front of her and she had to sit down. Worked on standing again and this time she did better moving her feet up under her. Worked on taking steps over to the chair. Pt did fairly well following cues to move her feet. Got her set up in chair with needs in reach and chair alarm under her. Will continue to follow. This section established at most recent assessment PROBLEM LIST (Impairments causing functional limitations): 1. Decreased Strength 2. Decreased ADL/Functional Activities 3. Decreased Transfer Abilities 4. Decreased Ambulation Ability/Technique 5. Decreased Balance 6. Decreased Activity Tolerance INTERVENTIONS PLANNED: (Benefits and precautions of physical therapy have been discussed with the patient.) 1. Balance Exercise 2. Bed Mobility 3. Gait Training 4. Therapeutic Activites 5. Therapeutic Exercise/Strengthening 6. Transfer Training TREATMENT PLAN: Frequency/Duration: daily for duration of hospital stay Rehabilitation Potential For Stated Goals: Good REHAB RECOMMENDATIONS (at time of discharge pending progress):   
Placement: It is my opinion, based on this patient's performance to date, that Ms. Estrada Key may benefit from intensive therapy at a [address] [place] [address] after discharge due to the functional deficits listed above that are likely to improve with skilled rehabilitation and concerns that he/she may be unsafe to be unsupervised at home due to decreased balance, increased weakness . Equipment: ? To be determined HISTORY:  
History of Present Injury/Illness (Reason for Referral): 
 PER MD NOTE: Patient is a pleasantly demented  80 y.o.  female who presents to ER today in the company of her  who reports 1 week history of being unable to ambulate without assistance and intermittently complaining of left hip pain. She has not fallen, nor has she struck the hip. He states she has had a shuffling gait for about a month. She had a right hip fracture a few years ago sustained in a fall with ORIF. He is not aware of history of osteoporosis. She has no left leg rotation, negative pelvis rock and does not seem to be in pain when the hip is manipulated. He also reports anorexia and not eating much for about a month with unknown weight loss. She has also been sleeping more than usual for about a month. He has two sons who live nearby with their wives, all of whom assist with her care when possible. She is found with a UTI, FRED, dehydration and WBC of 15.5. Lactic acid: 1.1She also has known DDD and has been walking bent forward for quite some time with difficulty straightening back on command. She verbalizes infrequently, however speech is clear when she does with inability to respond appropriately to conversation. She has no additional symptoms, fever, GI upset. Does not complain of any additional pain. Admitting to floor with IVF, urine culture pending, PT, OT consults. Will obtain MRI of hip if needed. Past Medical History/Comorbidities:  
Ms. Rhett Pastor  has a past medical history of Arthritis, Chronic pain, Debility (8/27/2020), Dementia ([facility] [address] (8/27/2020), and Hypertension. Ms. Rhett Pastor  has no past surgical history on file. Social History/Living Environment:  
Home Environment: Private residence # Steps to Enter: 5 One/Two Story Residence: One story Living Alone: Yes Support Systems: Child(lupe), Spouse/Significant Other/Partner Patient Expects to be Discharged to[de-identified] Private residence Current DME Used/Available at Home: Cane, straight, Grab bars, Walker, rollator Prior Level of Function/Work/Activity: 
Pt living at home with spouse, unsure of her mobility and ADL status due to her dementia Number of Personal Factors/Comorbidities that affect the Plan of Care: 1-2: MODERATE COMPLEXITY EXAMINATION:  
Most Recent Physical Functioning:  
Gross Assessment: 
AROM: Generally decreased, functional 
Strength: Generally decreased, functional 
Coordination: Grossly decreased, non-functional 
         
  
Posture: 
Posture (WDL): Exceptions to [facility] Posture Assessment: Forward head, Kyphosis, Rounded shoulders Balance: 
Sitting: Impaired Sitting - Static: Fair (occasional) Sitting - Dynamic: Poor (constant support) Standing: Pull to stand; With support Bed Mobility: 
Supine to Sit: Total assistance Sit to Supine: Total assistance Scooting: Total assistance Wheelchair Mobility: 
  
Transfers: 
Sit to Stand: Moderate assistance; Additional time;Assist x2 Stand to Sit: Moderate assistance;Assist x2 Gait: 
  
Base of Support: Narrowed Speed/Nydia: Shuffled; Slow Step Length: Left shortened;Right shortened Gait Abnormalities: Decreased step clearance;Shuffling gait; Step to gait;Trunk sway increased Distance (ft): 3 Feet (ft) Assistive Device: Walker, rolling Ambulation - Level of Assistance: Moderate assistance;Assist x2 Body Structures Involved: 1. Muscles Body Functions Affected: 1. Movement Related Activities and Participation Affected: 1. Mobility 2. Self Care Number of elements that affect the Plan of Care: 4+: HIGH COMPLEXITY CLINICAL PRESENTATION:  
Presentation: Evolving clinical presentation with changing clinical characteristics: MODERATE COMPLEXITY CLINICAL DECISION MAKIN [facility] [address] [address] AM-PAC 6 Clicks Basic Mobility Inpatient Short Form How much difficulty does the patient currently have. .. Unable A Lot A Little None 1. Turning over in bed (including adjusting bedclothes, sheets and blankets)?    [] 1   [] 2   [x] 3   [] 4  
 2.  Sitting down on and standing up from a chair with arms ( e.g., wheelchair, bedside commode, etc.)   [] 1   [x] 2   [] 3   [] 4  
3. Moving from lying on back to sitting on the side of the bed? [] 1   [x] 2   [] 3   [] 4 How much help from another person does the patient currently need. .. Total A Lot A Little None 4. Moving to and from a bed to a chair (including a wheelchair)? [] 1   [x] 2   [] 3   [] 4  
5. Need to walk in hospital room? [] 1   [x] 2   [] 3   [] 4  
6. Climbing 3-5 steps with a railing? [x] 1   [] 2   [] 3   [] 4  
© 2007, Trustees of [address] [place] [address] [address] [address], under license to [employer]. All rights reserved Score:  Initial: 12 Most Recent: X (Date: -- ) Interpretation of Tool:  Represents activities that are increasingly more difficult (i.e. Bed mobility, Transfers, Gait). Medical Necessity:    
· Patient is expected to demonstrate progress in  
· strength, coordination, and functional technique ·  to  
· decrease assistance required with functional mobility · . Reason for Services/Other Comments: 
· Patient continues to require skilled intervention due to  
· Inability to complete functional mobility independently, her dementia could be a barrier to progress · . Use of outcome tool(s) and clinical judgement create a POC that gives a: Questionable prediction of patient's progress: MODERATE COMPLEXITY  
  
 
 
 
TREATMENT:  
(In addition to Assessment/Re-Assessment sessions the following treatments were rendered) Pre-treatment Symptoms/Complaints:  none Pain: Initial: no reports of pain Post Session:  no reports of pain Today's treatment session addressed Decreased Strength, Decreased ADL/Functional Activities, Decreased Transfer Abilities, Decreased Ambulation Ability/Technique, Decreased Balance, Decreased Activity Tolerance and Decreased Cognition to progress towards achieving goal(s) 1, 2 and 3. During this session, Occupational Therapy addressed ADLs and cognition to progress towards their discipline specific goal(s). Co-treatment was necessary to improve patient's cognitive participation, ability to follow higher level commands, ability to increase activity demands and ability to return to normal functional activity. Therapeutic Activity: (    23): Therapeutic activities including Bed transfers, Chair transfers, Ambulation on level ground and sitting and standing balance to improve mobility, strength, balance and coordination. Required moderate assist of 2   to promote dynamic balance in standing. Braces/Orthotics/Lines/Etc:  
· IV Treatment/Session Assessment:   
· Response to Treatment:  pt was cooperative · Interdisciplinary Collaboration:  
o Occupational Therapist 
o Registered Nurse · After treatment position/precautions:  
o Up in chair 
o Bed alarm/tab alert on 
o Bed/Chair-wheels locked 
o Call light within reach · Compliance with Program/Exercises: Will assess as treatment progresses · Recommendations/Intent for next treatment session: \"Next visit will focus on advancements to more challenging activities and reduction in assistance provided\". Total Treatment Duration: PT Patient Time In/Time Out Time In: 9397 Time Out: 4940 Mateus Guthrie PT

## 2020-08-29 NOTE — PROGRESS NOTES
Assisted pt with breakfast. Pt is disoriented x 4, but calm and cooperative. Shift assessment complete. Assisted pt in brief change with small bm. No needs at this time.

## 2020-08-29 NOTE — PROGRESS NOTES
TRANSFER - IN REPORT: 
 
Verbal [facility]unit[facility] for routine progression of care Report consisted of patients Situation, Background, Assessment and  
Recommendations(SBAR). Information from the following report(s) SBAR, Kardex, ED Summary, Procedure Summary, Intake/Output, MAR, Recent Results and Med Rec Status was reviewed with the receiving nurse. Opportunity for questions and clarification was provided. Assessment completed upon patients arrival to unit and care assumed.

## 2020-08-29 NOTE — PROGRESS NOTES
Shift assessment completed via flow sheet. Pt only oriented to person. Periodic confusion but calm. Respirations are even and unlabored. Lungs clear upon auscultation. Bowel sounds active in all four quadrants. Pt denies pain or any needs at this time. IV site is CDI no signs of infiltration or phlebitis. Safety measures in place. Bed low, locked and call light within reach. Instructed pt to call for assistance. No distress noted. Will continue to monitor.

## 2020-08-29 NOTE — PROGRESS NOTES
Chart reviewed. SW spoke with spouse at bedside in an attempt to discuss d/c plans. Spouse states he is her full time caregiver & states 'it is a full time job'. Spouse admits they need help at home but at the same time unrealistic with pt's limitations, even though he confirms she is mostly non-verbal, unable to ambulate or feed herself & is in briefs. SW asked about children ( they have a son Cj Kulkarni in [place] with many health issues & a son in [place], also with health issues ). Spouse states they had a meeting set up with a [employer] for Friday but had to cancel once pt admitted. Spouse states they live on a limited income. Spouse mentioned multiple times that [employer] family have offered to help. SW discussed HH option, which he was receptive but then states ' not sure how much they can help her to improve'. When SW asked when MD had diagnosed pt with dementia. Spouse became somewhat defensive stating ' I don't like labels'. SW discussed STR which spouse was totally against.  SW encouraged spouse to speak with his family & attempt to develop a plan & that SW will f/u on Sunday. SW spoke with pt's grand-daughter-in-law Tyron Bal [phone] ) who lives in [place], has been here all week but will be leaving town on Monday. Blayne Delarosa has been staying here with Mr. Idris Cohen ( which he at no time mentioned to UMER ). Blayne Delarosa confirms that both of pt's sons do have health issues. Per Blayne Delarosa once she leaves town the closest relative able to assist is ( Rayshawn Burgos d-in-law ( to son Cj Kulkarni )  [phone] ). SW strongly encouraged Blayne Delarosa to contact sitter agencies to arrange meetings for Monday. Also suggested they reach out to the [employer] to explore sitters ( students ) they can hire for a lower cost.   SW is unsure spouse will be able to afford the cost of sitters.   UMER told Blayne Cea issues with pt going for STR, limited time that Medicare will cover if pt unable to follow commands & progress, also the inability for spouse to visit. SW discussed [place] & caregivers in the home may be a more compassionate plan.   UMER did assure Chrstie she will discuss STR again with spouse on Sun.

## 2020-08-29 NOTE — PROGRESS NOTES
Problem: Self Care Deficits Care Plan (Adult) Goal: *Acute Goals and Plan of Care (Insert Text) Description: Description: 1. Patient will perform simple grooming with verbal cues and maximum assistance. 2. Patient will perform self feeding with verbal cues and maximum assistance. 2. Patient will perform toilet transfers to [facility] with minimal assistance. 3. Patient will participate in 30 + minutes of ADL/ therapeutic exercise/therapeutic activity with min rest breaks to increase activity tolerance for self care. 4. Patient will perform ADL functional mobility in room with minimal assistance. Goals to be set for a 3 day trial to assess pt's cognitive status and learn more about pt's baseline of function Outcome: Progressing Towards Goal 
 
 
OCCUPATIONAL THERAPY: Initial Assessment, Daily Note, and AM 8/29/2020 INPATIENT: OT Visit Days: 2 [place] / [place] B / Product Type: Medicare /  
  
NAME/AGE/GENDER: Mary Ann Lew is a 80 y.o. female PRIMARY DIAGNOSIS:  Acute UTI [N39.0] <principal problem not specified> <principal problem not specified> 
 
  
ICD-10: Treatment Diagnosis:  
 · Generalized Muscle Weakness (M62.81) · Other lack of cordination (R27.8) · Difficulty in walking, Not elsewhere classified (R26.2) · Decreased cognition Precautions/Allergies: 
   Niacin; Tolectin [tolmetin]; and Naproxen ASSESSMENT:  
 
Ms. Gamal Blank presents with above diagnosis and was seen in room with PT present. Pt noted to be very confused with possible significant dementia. Physically the pt seems to be in good condition but does not follow commands and does not seem to understand how to move. OT will follow pt for a trial basis to see how she does and gain more information about her baseline of function and cognition. Pt assisted to edge of bed and stood with assistance. Pt noted to incontinent of bowel and bladder.  Pt returned to supine and was total assistance for rolling and changing brief. Pt left with bed alert on and RN notified that lunch had come and that pt would need assistance with her lunch.  
 
8/29/2020 Pt seen in room with PT present. Continue to assess pt's ability to follow commands and move on her own at all. Pt supine in bed and brief change prior to getting to edge of bed. Pt was unable to follow any commands but was able to stand with assistance and shuffle a few feet to the recliner. Attempted to assist pt with drinking and pt unable to bring cup to mouth or drink from a straw. Pt did move better today and will continue to follow. This section established at most recent assessment PROBLEM LIST (Impairments causing functional limitations): 1. Decreased Strength 2. Decreased ADL/Functional Activities 3. Decreased Transfer Abilities 4. Decreased Ambulation Ability/Technique 5. Decreased Balance 6. Decreased Activity Tolerance 7. Decreased Cognition INTERVENTIONS PLANNED: (Benefits and precautions of occupational therapy have been discussed with the patient.) 1. Activities of daily living training 2. Cognitive training 3. Therapeutic activity 4. Therapeutic exercise TREATMENT PLAN: Frequency/Duration: Follow patient 2 times per week to address above goals. Rehabilitation Potential For Stated Goals:  guarded REHAB RECOMMENDATIONS (at time of discharge pending progress):   
Placement: It is my opinion, based on this patient's performance to date, that Ms. Zachery Yanez may benefit from participating in 1-2 additional therapy sessions in order to continue to assess for rehab potential and then make recommendation for disposition at discharge. Equipment:  
? None at this time OCCUPATIONAL PROFILE AND HISTORY:  
History of Present Injury/Illness (Reason for Referral): 
See H&P Past Medical History/Comorbidities:  
Ms. Zachery Yanez  has a past medical history of Arthritis, Chronic pain, Debility (8/27/2020), Dementia ([facility] [address] (8/27/2020), and Hypertension. Ms. Laurie Gómez  has no past surgical history on file. Social History/Living Environment:  
Home Environment: Private residence # Steps to Enter: 5 One/Two Story Residence: One story Living Alone: Yes Support Systems: Child(lupe), Spouse/Significant Other/Partner Patient Expects to be Discharged to[de-identified] Private residence Current DME Used/Available at Home: Cane, straight, Grab bars, Walker, rollator Prior Level of Function/Work/Activity: 
Unsure of prior level of care Number of Personal Factors/Comorbidities that affect the Plan of Care: Expanded review of therapy/medical records (1-2):  MODERATE COMPLEXITY ASSESSMENT OF OCCUPATIONAL PERFORMANCE[de-identified]  
Activities of Daily Living:  
Basic ADLs (From Assessment) Complex ADLs (From Assessment) Feeding: Total assistance Oral Facial Hygiene/Grooming: Total assistance Bathing: Total assistance Upper Body Dressing: Total assistance Lower Body Dressing: Total assistance Toileting: Total assistance Grooming/Bathing/Dressing Activities of Daily Living Cognitive Retraining Orientation Retraining: Person;Place Safety/Judgement: Decreased awareness of environment Toileting Toileting Assistance: Total assistance(dependent)(brief change) Bed/Mat Mobility Supine to Sit: Total assistance Sit to Supine: Total assistance Sit to Stand: Moderate assistance;Assist x2 Stand to Sit: Moderate assistance;Assist x2 Scooting: Total assistance Most Recent Physical Functioning:  
Gross Assessment: 
  
         
  
Posture: 
Posture (WDL): Exceptions to [facility] Posture Assessment: Forward head, Kyphosis, Rounded shoulders Balance: 
Sitting: Impaired Sitting - Static: Fair (occasional) Sitting - Dynamic: Poor (constant support) Standing: Pull to stand; Without support Bed Mobility: 
Supine to Sit: Total assistance Sit to Supine: Total assistance Scooting: Total assistance Wheelchair Mobility: 
  
Transfers: 
Sit to Stand: Moderate assistance;Assist x2 Stand to Sit: Moderate assistance;Assist x2 Patient Vitals for the past 6 hrs: 
 BP SpO2 Pulse 20 1125 150/76 95 % 74 Mental Status Neurologic State: Alert, Confused Orientation Level: Disoriented X4 Cognition: No command following Perception: Appears intact Perseveration: Perseverates during conversation Safety/Judgement: Decreased awareness of environment Physical Skills Involved: 
1. Balance 2. Strength 3. Activity Tolerance Cognitive Skills Affected (resulting in the inability to perform in a timely and safe manner): 1. Immediate Memory 2. Sustained Attention 3. Comprehension 4. Expression Psychosocial Skills Affected: 1. Environmental Adaptation 2. Social Interaction 3. Self-Awareness Number of elements that affect the Plan of Care: 5+:  HIGH COMPLEXITY CLINICAL DECISION MAKIN[address] [place] [id number] AM-PAC 6 Clicks Daily Activity Inpatient Short Form How much help from another person does the patient currently need. .. Total A Lot A Little None 1. Putting on and taking off regular lower body clothing? [x] 1   [] 2   [] 3   [] 4  
2. Bathing (including washing, rinsing, drying)? [x] 1   [] 2   [] 3   [] 4  
3. Toileting, which includes using toilet, bedpan or urinal?   [x] 1   [] 2   [] 3   [] 4  
4. Putting on and taking off regular upper body clothing? [x] 1   [] 2   [] 3   [] 4  
5. Taking care of personal grooming such as brushing teeth? [x] 1   [] 2   [] 3   [] 4  
6. Eating meals? [x] 1   [] 2   [] 3   [] 4  
© , Trustees of [address] [place] [id number], under license to [employer]. All rights reserved Score:  Initial: 6 Most Recent: X (Date: -- ) Interpretation of Tool:  Represents activities that are increasingly more difficult (i.e. Bed mobility, Transfers, Gait). Use of outcome tool(s) and clinical judgement create a POC that gives a: MODERATE COMPLEXITY  
 
 
 
TREATMENT:  
(In addition to Assessment/Re-Assessment sessions the following treatments were rendered) Pre-treatment Symptoms/Complaints:  pt did not seem in pain 
Pain: Initial:  
   Post Session: Today's treatment session addressed Decreased Cognition to progress towards achieving goal(s) 4. During this session, Occupational Therapy addressed Functional Transfers to progress towards their discipline specific goal(s). Co-treatment was necessary to improve patient's cognitive participation. Therapeutic Activity (25 min): Therapeutic activities per grid below to improve mobility, strength, balance and coordination. Required minimal verbal, manual and tactile cues to promote dynamic balance in standing Also worked on cognitive status and ability to follow commands Symone Gibson Braces/Orthotics/Lines/Etc:  
· IV Treatment/Session Assessment:   
· Response to Treatment:  pt up in to edge of bed tolerated fair · Interdisciplinary Collaboration:  
o Physical Therapist 
o Occupational Therapist 
o Registered Nurse 
o  · After treatment position/precautions:  
o Up in chair 
o Bed alarm/tab alert on 
o Bed/Chair-wheels locked 
o Call light within reach 
o RN notified · Compliance with Program/Exercises: Will assess as treatment progresses. · Recommendations/Intent for next treatment session: \"Next visit will focus on reduction in assistance provided\". Total Treatment Duration: OT Patient Time In/Time Out Time In: 9118 Time Out: 3415 [email or web address], OT

## 2020-08-30 NOTE — PROGRESS NOTES
Physical therapy note: noted in chart that MRI has been ordered for hip pain. Will await MRI results and see patient as results and or schedule allow. Thanks.  Katheryn Cardona, PT

## 2020-08-30 NOTE — PROGRESS NOTES
Hospitalist Progress Note Subjective:  
Daily Progress Note: 8/29/2020 1934 Patient with advanced dementia here w 1 week history left hip pain without trauma. Xray negative, found with UTI, begun on rocephin. Also with anorexia and sharp decline for about a month.    
8/28:  Remains unchanged. Niece here from [address] [place] [address], spoke with RN, reports  not able to care for patient at this point. Does not feel patient's dementia is as advanced as it is.  refuses to allow patient to go to rehab, states he is going to get more help at home, however, has not done so to date. SW extensively involved. PT/OT in, patient with complete assist, frail and off balance. 8/29:  Up in chair without change. PT/OT continue to recommend SNF.  remains resistant, angry with SW for mentioning dementia. Extensive lecture to me regarding labeling patient, states \"people\" will let her shrivel up. Sons with extensive health issues, niece here from [address] [place] [address], going home tomorrow. Discussed at length. Continues to moan with left leg weight bearing, will get MRI hip to check for occult fx.  agrees. Current Facility-Administered Medications Medication Dose Route Frequency  potassium bicarb-citric acid (EFFER-K) tablet 20 mEq  20 mEq Oral BID  sodium chloride (NS) flush 5-40 mL  5-40 mL IntraVENous Q8H  
 sodium chloride (NS) flush 5-40 mL  5-40 mL IntraVENous PRN  
 cefTRIAXone (ROCEPHIN) 1 g in 0.9% sodium chloride (MBP/ADV) 50 mL  1 g IntraVENous Q24H  
 acetaminophen (TYLENOL) tablet 650 mg  650 mg Oral Q4H PRN  
 ondansetron (ZOFRAN) injection 4 mg  4 mg IntraVENous Q4H PRN  
 docusate sodium (COLACE) capsule 100 mg  100 mg Oral BID PRN  
 amLODIPine (NORVASC) tablet 5 mg  5 mg Oral DAILY  aspirin chewable tablet 81 mg  81 mg Oral DAILY  lisinopriL (PRINIVIL, ZESTRIL) tablet 20 mg  20 mg Oral DAILY  loratadine (CLARITIN) tablet 10 mg  10 mg Oral DAILY  lidocaine 4 % patch 1 Patch  1 Patch TransDERmal DAILY  montelukast (SINGULAIR) tablet 10 mg  10 mg Oral DAILY  atorvastatin (LIPITOR) tablet 10 mg  10 mg Oral QHS Review of Systems Patient is unable. Objective:  
 
Visit Vitals /63 (BP 1 Location: Right arm, BP Patient Position: At rest) Pulse 78 Temp 97.6 °F (36.4 °C) Resp 18 Ht 5' 2\" (1.575 m) Wt 67.6 kg (149 lb) SpO2 94% BMI 27.25 kg/m² O2 Device: Room air Temp (24hrs), Av.9 °F (36.6 °C), Min:97.5 °F (36.4 °C), Max:98.9 °F (37.2 °C) 
 
 07 -  1900 In: 61 [P.O.:60] Out: - General appearance: Alert and awake.  Frail appearing.  Follows some commands.  Oriented to self.  Cooperative, denies pain.  Minimal verbalization, clear speech not relevant to the conversation. Seems oblivious to surroundings, recognizes . Colbert Romeo and neat. Head: Normocephalic, without obvious abnormality, atraumatic Eyes: conjunctivae/corneas clear. PERRL Throat: Lips, mucosa, and tongue dry.  Teeth and gums normal 
Neck: supple, symmetrical, trachea midline, no adenopathy, thyroid: not enlarged, symmetric, no tenderness/mass/nodules, no carotid bruit and no JVD Lungs: clear to auscultation bilaterally Heart: regular rate and rhythm, S1, S2 normal, no murmur, click, rub or gallop Abdomen: soft, non-tender. Bowel sounds normal. No masses,  no organomegaly.  Negative pelvic rock. Extremities: All extremities normal, atraumatic, no cyanosis or edema,  
Skin: Skin color, texture, turgor dry, otherwise normal. No rashes or lesions Neurologic: Grossly normal for extensively debilitated patient, generalized weakness, no unilateral deficit.   
  
Data Review CXR:  No acute findings. 
  
LEFT HIP XRAY:  Lower lumbar degenerative spondylosis is present. Degenerative joint disease is present in the SI joints. The femoral heads are symmetric in contour and density.  There is no displaced fracture, malalignment or advanced degenerative change in the left hip. IMPRESSION: No acute findings. 
  
8/27:  URINE CULTURE Culture result: Abnormal             Final   
>100,000 COLONIES/mL ESCHERICHIA COLI Susceptibility   
       
Antibiotic  Interpretation  Value  Method  Comment Trimeth-Sulfamethoxa  Susceptible  <=0.5/9.5  ug/mL  ERYN     
Nitrofurantoin  Susceptible  <=16  ug/mL  ERYN     
Ampicillin ($)  Resistant  >16  ug/mL  ERYN     
Gentamicin ($)  Susceptible  <=1  ug/mL  ERYN     
Tobramycin ($)  Susceptible  1  ug/mL  ERYN     
Ampicillin/sulbactam ($)  Susceptible  8/4  ug/mL  ERYN     
Cefazolin ($)  Susceptible  <=1  ug/mL  ERYN     
Ceftriaxone ($)  Susceptible  <=0.5  ug/mL  ERYN     
Cefepime ($$)  Susceptible  <=0.5  ug/mL  ERYN     
Piperacillin/Tazobac ($)  Susceptible  <=2/4  ug/mL  ERYN     
Aztreonam ($$$$)  Susceptible  <=1  ug/mL  ERYN     
Cefoxitin  Susceptible  <=4  ug/mL  ERYN     
  
 Ref. Range 8/29/2020 03:40 Sodium Latest Ref Range: 136 - 145 mmol/L 142 Potassium Latest Ref Range: 3.5 - 5.1 mmol/L 3.4 (L) Chloride Latest Ref Range: 98 - 107 mmol/L 109 (H) CO2 Latest Ref Range: 21 - 32 mmol/L 25 Anion gap Latest Ref Range: 7 - 16 mmol/L 8 Glucose Latest Ref Range: 65 - 100 mg/dL 114 (H) BUN Latest Ref Range: 8 - 23 MG/DL 14 Creatinine Latest Ref Range: 0.6 - 1.0 MG/DL 0.88 Calcium Latest Ref Range: 8.3 - 10.4 MG/DL 8.9 GFR est non-AA Latest Ref Range: >60 ml/min/1.73m2 >60  
GFR est AA Latest Ref Range: >60 ml/min/1.73m2 >60 Ref. Range 8/29/2020 03:40 WBC Latest Ref Range: 4.3 - 11.1 K/uL 16.9 (H) RBC Latest Ref Range: 4.05 - 5.2 M/uL 3.93 (L) HGB Latest Ref Range: 11.7 - 15.4 g/dL 12.1 HCT Latest Ref Range: 35.8 - 46.3 % 37.1 MCV Latest Ref Range: 79.6 - 97.8 FL 94.4 MCH Latest Ref Range: 26.1 - 32.9 PG 30.8 MCHC Latest Ref Range: 31.4 - 35.0 g/dL 32.6 RDW Latest Ref Range: 11.9 - 14.6 % 14.1 PLATELET Latest Ref Range: 150 - 450 K/uL 381 MPV Latest Ref Range: 9.4 - 12.3 FL 10.1 NEUTROPHILS Latest Ref Range: 43 - 78 % 83 (H) LYMPHOCYTES Latest Ref Range: 13 - 44 % 8 (L) MONOCYTES Latest Ref Range: 4.0 - 12.0 % 7 EOSINOPHILS Latest Ref Range: 0.5 - 7.8 % 0 (L) BASOPHILS Latest Ref Range: 0.0 - 2.0 % 1 IMMATURE GRANULOCYTES Latest Ref Range: 0.0 - 5.0 % 1 DF Latest Units:   AUTOMATED ABSOLUTE NRBC Latest Ref Range: 0.0 - 0.2 K/uL 0.00  
ABS. NEUTROPHILS Latest Ref Range: 1.7 - 8.2 K/UL 14.0 (H)  
ABS. IMM. GRANS. Latest Ref Range: 0.0 - 0.5 K/UL 0.1 ABS. LYMPHOCYTES Latest Ref Range: 0.5 - 4.6 K/UL 1.4  
ABS. MONOCYTES Latest Ref Range: 0.1 - 1.3 K/UL 1.3  
ABS. EOSINOPHILS Latest Ref Range: 0.0 - 0.8 K/UL 0.1 ABS. BASOPHILS Latest Ref Range: 0.0 - 0.2 K/UL 0.1 Assessment/Plan:  
Acute E Coli UTI  
            Continue Rocephin x 3 days minimum, then po if       discharging for total of 7 days.               
Anorexia with unknown weight loss             Nutrition consult  
            [id number]  
  
  Unintentional weight loss:  Unclear how much             As above.  
  
Advanced dementia:  High degree of suspicion this is the cause for most of her current problems.  very reluctant to engage home assist or  
            rehab. Refuses to acknowledge dementia  
  
Intermittent complaints of left hip pain without trauma or fall.   
            MRI tomorrow to check for occult fx. Continued pain with  weight bearing. 
            PT/OT recommend rehab  
  
Profound Debility:   
            Place PPD in case rehab may be beneficial  
            PT/OT/CM consults             Fall precautions  
  
FRED due to dehydration  
            Normal saline at 125 ml/hr x 48 hours, resolving   
            Daily labs, add mag in am  
  
Leukocytosis:  Dehydration and FRED 
            Monitor  
  
Hyperglycemia:  A1C WNL 
  
Hypertension:  Home meds CONCERN FOR SAFE DISCHARGE DUE TO  RESISTANT TO ASSIST 
  
Care Plan discussed with: , SW and RN 
  
Signed By: Jacquelin Perez NP August 29, 2020

## 2020-08-30 NOTE — PROGRESS NOTES
Family meeting with spouse, louis'daughter-in -law Emily Conception [phone] ) & d-in-law ( Fabiola Cons [phone] ) on  face time & Hailey Tapia ( DAIJA ) was present initially to discuss results of MRI. Discussion was for pt going home with [place]  ( PT/OT Jeny Marcelo)  Or home with hospice services. NP Chase Pacheco ) also offered option of palliative care speaking with family to determine goals of care. Spouse verbalized that he felt PT/OT services will cause pt pain, without much benefit, he states turning the pt in bed causes her pain. Family is aware for the need to hire caregivers & will work on that on Monday, in the meantime family will need to assist.  Spouse is overwhelmed & became a bit tearful during our conversation. After continued discussion, spouse verbalized again that PT would not serve any purpose or benefit pt. SW asked spouse if he had made decision for hospice & he stated 'yes'. UMER stated that she would notify NP to place hospice consult. Hospice would call Chon Hankins on Monday. UMER further explained that if pt accepted for hospice they would arrange for hospital bed & bed side table. UMER again stressed to family importance of arranging for caregivers, as d/c could be as soon as [place] or Tues. UMER called Maria D at [facility] & asked her to please have liaison call Emily Sanderson ) on Monday.

## 2020-08-30 NOTE — PROGRESS NOTES
Critical lab potassium 2.9 received from lab. Notified Dr. Tam Medel. New orders received to infuse a total of 40 meq of potassium chloride IVP once.

## 2020-08-30 NOTE — PROGRESS NOTES
Removed #20 IV jelco to LAC with tip intact related to infiltration at 0450. New site obtained to protocol in L upper arm. Tolerated well. Ordered IV potassium started at 0455. See MAR for details. Site patent and infusing.

## 2020-08-30 NOTE — PROGRESS NOTES
Shift assessment complete. Bed low to ground, Bed rails up x 3. Pt resting in bed. Pt does not respond appropriately to questions when asked. She is incontinent of bowel and bladder, and has multiple soft stools a day. No needs at this time.

## 2020-08-31 NOTE — PROGRESS NOTES
Care Management Interventions PCP Verified by CM: Yes Transition of Care Consult (CM Consult): Discharge Planning Physical Therapy Consult: Yes Current Support Network: Lives with Spouse, Own Home Confirm Follow Up Transport: Other (see comment) The Patient and/or Patient Representative was Provided with a Choice of Provider and Agrees with the Discharge Plan?: Yes Freedom of Choice List was Provided with Basic Dialogue that Supports the Patient's Individualized Plan of Care/Goals, Treatment Preferences and Shares the Quality Data Associated with the Providers?: Yes Discharge Location Discharge Placement: Home with hospice SW received call from Amada Grissom with [facility] stating pt was approved for hospice services at home. Amada Grissom has spoken with family ( spouse, granddaug-in-law ) arranged for DME delivery & set up transport time for 2:30 today. Amada Grissom request that SW have family at bedside sign DNR. SW spoke with spouse & offered support.

## 2020-08-31 NOTE — HOSPICE
Patient approved for [facility] with [facility]. Consents will be obtained in the home with admission nurse. Will need a transport DNR signed. Sandra ARGUELLES and Marva WILDER updated. [facility]. Admission time 3pm. Any questions please call [phone]. Thank you for the referral, Jess Chaudhry RN BSN [facility]

## 2020-08-31 NOTE — PROGRESS NOTES
Report given to Naz Camilo with Hospice care. Opportunity given to ask questions and answers provided.

## 2020-08-31 NOTE — PROGRESS NOTES
Shift assessment complete. Pt has periodic confusion and difficulty verbalizing needs. Lung sounds clear to auscultation, regular, and unlabored. S1S2 heart sounds auscultated. Abd soft with active bowel sounds in all quadrants. IV c/d/i and infusing without difficulty. Bed alarm on, wheels locked, side rails x3, gripper socks on, and call light in reach. Encouraged to call for help if needed.

## 2020-08-31 NOTE — PROGRESS NOTES
Problem: Falls - Risk of 
Goal: *Absence of Falls Description: Document Jodrana Hitchcock Fall Risk and appropriate interventions in the flowsheet. Outcome: Progressing Towards Goal 
Note: Fall Risk Interventions: 
Mobility Interventions: OT consult for ADLs, Patient to call before getting OOB, PT Consult for mobility concerns, PT Consult for assist device competence, Strengthening exercises (ROM-active/passive), Utilize walker, cane, or other assistive device Mentation Interventions: Adequate sleep, hydration, pain control, Increase mobility, More frequent rounding, Reorient patient Medication Interventions: Assess postural VS orthostatic hypotension, Patient to call before getting OOB, Teach patient to arise slowly Elimination Interventions: Call light in reach, Toilet paper/wipes in reach, Toileting schedule/hourly rounds History of Falls Interventions: Bed/chair exit alarm, Room close to nurse's station Problem: Patient Education: Go to Patient Education Activity Goal: Patient/Family Education Outcome: Progressing Towards Goal 
  
Problem: Nutrition Deficit Goal: *Optimize nutritional status Outcome: Progressing Towards Goal 
  
Problem: Patient Education: Go to Patient Education Activity Goal: Patient/Family Education Outcome: Progressing Towards Goal 
  
Problem: Patient Education: Go to Patient Education Activity Goal: Patient/Family Education Outcome: Progressing Towards Goal 
  
Problem: Pressure Injury - Risk of 
Goal: *Prevention of pressure injury Description: Document Yeison Scale and appropriate interventions in the flowsheet. Outcome: Progressing Towards Goal 
Note: Pressure Injury Interventions: 
Sensory Interventions: Assess changes in LOC, Float heels, Keep linens dry and wrinkle-free Moisture Interventions: Absorbent underpads, Apply protective barrier, creams and emollients, Check for incontinence Q2 hours and as needed, Internal/External urinary devices, Moisture barrier Activity Interventions: PT/OT evaluation Mobility Interventions: Float heels, PT/OT evaluation Nutrition Interventions: Document food/fluid/supplement intake Friction and Shear Interventions: HOB 30 degrees or less Problem: Patient Education: Go to Patient Education Activity Goal: Patient/Family Education Outcome: Progressing Towards Goal

## 2020-08-31 NOTE — DISCHARGE SUMMARY
Discharge Summary Patient ID: 
Sumner Goldberg [id number] 
13 y.o. 
8/29/1932 Admit date: 8/27/2020 12:12 PM 
Discharge date and time: 8/31/2020 Attending: Amada Jain MD 
PCP:  Tano Chester MD 
Treatment Team: Attending Provider: Amada Jain MD; : Halie Christie; Hospitalist: Floresita Abrams NP; Utilization Review: Nico Osorio; Care Manager: Leatha Polo; : Chelsea Fernandez; Physical Therapist: Darryl Sepulveda PT; Nurse Practitioner: Cassie Moser NP 
Principal Diagnosis <principal problem not specified> Active Problems: 
  Acute UTI (8/27/2020) Anorexia (8/27/2020) Unintentional weight loss (8/27/2020) Overview: UNCLEAR HOW [facility] Dementia ([facility] (8/27/2020) Overview: 8/27/20:  RARELY SPEAKS. SHUFFLING X 1 MONTH Left hip pain (8/27/2020) Overview: NO TRAUMA 
 
  DDD (degenerative disc disease), lumbar (8/27/2020) Debility (8/27/2020) FRED (acute kidney injury) ([facility] (8/27/2020) Dehydration (8/27/2020) Leukocytosis (8/27/2020) Hyperglycemia (8/27/2020) Hypertension (8/27/2020) * Admission Diagnoses: Acute UTI [N39.0] * Discharge Diagnoses:   
Hospital Problems as of 8/31/2020 Date Reviewed: 8/27/2020 Codes Class Noted - Resolved POA Acute UTI ICD-10-CM: N39.0 ICD-9-CM: 599.0  8/27/2020 - Present Unknown Anorexia (Chronic) ICD-10-CM: R63.0 ICD-9-CM: 783.0  8/27/2020 - Present Unknown Unintentional weight loss (Chronic) ICD-10-CM: R63.4 ICD-9-CM: 783.21  8/27/2020 - Present Unknown Overview Signed 8/27/2020  4:06 PM by Floresita Abrams NP  
  [address] Dementia ([facility] ICD-10-CM: F03.90 ICD-9-CM: 294.20  8/27/2020 - Present Unknown Overview Signed 8/27/2020  4:07 PM by Floresita Abrams NP  
  8/27/20:  [place]. SHUFFLING X 1 MONTH Left hip pain ICD-10-CM: M25.552 ICD-9-CM: 719.45  8/27/2020 - Present Unknown Overview Signed 8/27/2020  4:07 PM by Scott Vazquez NP  
  NO TRAUMA 
  
  
   
 DDD (degenerative disc disease), lumbar (Chronic) ICD-10-CM: M51.36 
ICD-9-CM: 722.52  8/27/2020 - Present Unknown Debility (Chronic) ICD-10-CM: R53.81 ICD-9-CM: 799.3  8/27/2020 - Present Unknown FRED (acute kidney injury) ([facility] ICD-10-CM: N17.9 ICD-9-CM: 584.9  8/27/2020 - Present Unknown Dehydration ICD-10-CM: E86.0 ICD-9-CM: 276.51  8/27/2020 - Present Unknown Leukocytosis ICD-10-CM: G20.448 ICD-9-CM: 288.60  8/27/2020 - Present Unknown Hyperglycemia ICD-10-CM: R73.9 ICD-9-CM: 790.29  8/27/2020 - Present Unknown Hypertension (Chronic) ICD-10-CM: I10 
ICD-9-CM: 401.9  8/27/2020 - Present Unknown Hospital Course: Ms. Blane Edmonds is a 79 yo female with PMH of advanced dementia, HTN, who presented with 1 week hx of unable to ambulate without assistance, c/o left hip pain.  reported shuffling gait for about a month. She was found to have a UTI, FRED, dehydration. Xray left hip negative. Started on rocephin. Urine cx with e.coli. MRI left hip no evidence of acute fx, full thickness tear of the right gluteus medius tendon, bilateral tendinosis of the hamstring origins with partial thickness tearing on right. Plans for DC today with Home Hospice. Diagnostic Study/Procedure results summary copied from within [facility] EMR: 
 
History: Dementia. Left hip pain for 2 weeks without known injury. 
  
PORTABLE CHEST X-RAY: Comparison is made to previous study October 31, 2013. The 
cardiac silhouette appears enlarged. There is no consolidation, pleural 
effusions, or pulmonary edema. There is gaseous distention of the stomach. 
  
LEFT HIP SERIES: Lower lumbar degenerative spondylosis is present. Degenerative 
joint disease is present in the SI joints.  The femoral heads are symmetric in 
 contour and density. There is no displaced fracture, malalignment or advanced 
degenerative change in the left hip. 
  
IMPRESSION IMPRESSION: No acute findings. History: Dementia. Left hip pain for 2 weeks without known injury. 
  
PORTABLE CHEST X-RAY: Comparison is made to previous study October 31, 2013. The 
cardiac silhouette appears enlarged. There is no consolidation, pleural 
effusions, or pulmonary edema. There is gaseous distention of the stomach. 
  
LEFT HIP SERIES: Lower lumbar degenerative spondylosis is present. Degenerative 
joint disease is present in the SI joints. The femoral heads are symmetric in 
contour and density. There is no displaced fracture, malalignment or advanced 
degenerative change in the left hip. 
  
IMPRESSION IMPRESSION: No acute findings. EXAM: Left hip MRI without contrast. 
INDICATION: Patient with advanced dementia with history of left hip pain 
evaluate one week prior without trauma with negative radiographs. Joint PICC 
line in status over the past week. COMPARISON: Pelvic and left hip films dated August 27, 2020. Technique: Multiplanar multisequence imaging of the hip without contrast. 
  
FINDINGS: 
No evidence of acute fracture. There is muscular edema along the left iliopsoas 
musculature indicative of reactive edema secondary to strain. There is presacral 
soft tissue fluid. Moderate bilateral hip joint effusions. Muscular strain of 
the left vastus lateralis and mild muscular edema of the adductor compartments 
bilaterally. Bilateral iliopsoas and rectus femoris tendons are intact. Full-thickness tear of the right gluteus medius measuring approximately 1.5 cm 
in AP extent with a small amount of fluid in the right greater trochanteric 
bursa. There is a small amount of fluid in the left greater trochanteric bursa 
with left gluteus medius tendinosis and mild partial-thickness tearing. Mild pubic symphysis degenerative change. No left hip focal labral tear evident. Bilateral hamstring origin tendinosis with partial-thickness tearing on the 
right. Moderate lower lumbar spine arthropathy. No aggressive osseous lesion. 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 
  
Limited evaluation of the internal organs of the pelvis does not demonstrate a 
focal abnormality, although note is made that the study is tailored for 
evaluation of the musculoskeletal structures of the pelvis. 
  
  
IMPRESSION IMPRESSION: 
1. No evidence of acute fracture. Moderate bilateral hip joint effusions. Scattered muscular strain as above. 2. Full-thickness tear of the right gluteus medius tendon measuring 
approximately 1.5 cm in AP extent. Small volume of fluid in the right and left 
greater trochanteric bursa. 3. Bilateral tendinosis of the hamstring origins with partial-thickness tearing 
on the right. 4. Moderate lower lumbar spine arthropathy. 
  
 
Labs: Results:  
   
Chemistry Recent Labs 08/31/20 
9886 08/30/20 
2257 08/29/20 
0340 * 95 114*  141 142  
K 3.5 2.9* 3.4*  
* 108* 109* CO2 24 25 25 BUN 19 20 14 CREA 0.98 0.95 0.88 CA 8.7 8.3 8.9 AGAP 6* 8 8 CBC w/Diff Recent Labs 08/31/20 
7190 08/30/20 
8064 08/29/20 
0340 WBC 12.7* 9.2 16.9*  
RBC 3.55* 3.34* 3.93* HGB 11.1* 10.5* 12.1 HCT 33.4* 31.4* 37.1  311 381 GRANS 76 71 83* LYMPH 14 15 8*  
EOS 2 4 0* Cardiac Enzymes No results for input(s): CPK, CKND1, KAVON in the last 72 hours. No lab exists for component: [employer] Plant Coagulation No results for input(s): PTP, INR, APTT, INREXT in the last 72 hours. Lipid Panel @BRIEFLAB(CHOL,CHOLPOCT,[id number],CHOLX,CHOLP,CHLST,CHOLV,251358,HDL,HDLPOC,HDLPOCT,308724,NHDLCT,[id number],HDLC,HDLP,LDL,LDLPOCT,LDLCPOC,986517,NLDLCT,DLDL,LDLC,DLDLP,214227,VLDLC,VLDL,TGL,TGLX,TRIGL,[id number],TRIGP,TGLPOCT,998343,403760,CHHD,CHHDX)@ BNP No results for input(s): BNPP in the last 72 hours. Liver Enzymes No results for input(s): TP, ALB, TBIL, AP in the last 72 hours. No lab exists for component: SGOT, GPT, DBIL Thyroid Studies No results found for: T4, T3U, TSH, TSHEXT Discharge Exam: 
Visit Vitals /74 (BP 1 Location: Left arm, BP Patient Position: At rest) Pulse 76 Temp 98.2 °F (36.8 °C) Resp 14 Ht 5' 2\" (1.575 m) Wt 67.6 kg (149 lb) SpO2 95% BMI 27.25 kg/m² General appearance: frail, drowsy, appears comfortable, minimal verbalization Lungs: clear to auscultation bilaterally, resp even and nonlabored Heart: regular rate and rhythm, S1S2 present without murmurs rubs gallops. No LE edema Abdomen: soft, non-distended Bowel sounds normal.  
Extremities: no cyanosis. Neuro:  Confused, minimal verbalization Disposition: home hospice Discharge Condition: stable Patient Instructions:  
Current Discharge Medication List  
  
START taking these medications Details  
cefpodoxime (VANTIN) 100 mg tablet Take 1 Tab by mouth two (2) times a day for 2 days. Qty: 4 Tab, Refills: 0 CONTINUE these medications which have NOT CHANGED Details  
melatonin 10 mg tab Take 1 Tab by mouth nightly. meclizine (ANTIVERT) 25 mg tablet Take 25 mg by mouth three (3) times daily as needed for Dizziness. montelukast (Singulair) 10 mg tablet Take 10 mg by mouth daily. aspirin 81 mg chewable tablet Take 81 mg by mouth daily. Cetirizine (ZyrTEC) 10 mg cap Take 1 Tab by mouth nightly. lidocaine-menthol 4-4 % ptmd 1 Patch by Apply Externally route every twenty-four (24) hours. amlodipine (NORVASC) 5 mg tablet Take 5 mg by mouth daily. benazepril (LOTENSIN) 40 mg tablet Take 20 mg by mouth daily. simvastatin (ZOCOR) 10 mg tablet Take  by mouth nightly. multivitamin (ONE A DAY) tablet Take 1 Tab by mouth daily. Activity: Bedrest 
Diet: Comfort feeding Wound Care: None needed Full Code Surrogate decision maker:   Pneumonia and flu vaccine to be administered at discharge per hospital protocol Follow-up ·   DC to [facility] · DC on Vantin to complete UTI tx course Notes, labs, VS, diagnostic testing reviewed Case discussed with care team, Dr. Bruno Boyle Time spent to discharge patient  45 min Signed: 
Sharee Alvarez NP 
8/31/2020 
11:00 AM

## 2020-08-31 NOTE — PROGRESS NOTES
Hospitalist Progress Note Subjective:  
Daily Progress Note: 8/30/2020 1956 Patient with advanced dementia here w 1 week history left hip pain without trauma.  Xray negative, found with UTI, begun on rocephin.   Also with anorexia and sharp decline for about a month.    
8/28:  Remains unchanged.  Grand daughter in law, Fabiana James, here from [place], spoke with RN, reports  not able to care for patient at this point. Hetal Deann unrealistic regarding severity of patient's disease. He refuses to allow patient to go to rehab, states he is going to get more help at home, however, has not done so to date.  SW extensively involved.  PT/OT in, patient with complete assist, frail and off balance. does not do well when attempting to get up. In pain with weight bearing.   
8/29:  Up in chair without change. PT/OT continue to recommend SNF.  remains resistant, angry with SW for mentioning dementia. Extensive lecture to me regarding labeling patient, states \"people\" will let her shrivel up. Sons with extensive health issues, Fabiana James going home tomorrow. Discussed at length. Continues to moan with left leg weight bearing, will get MRI hip to check for occult fx.  agrees. 8/30:  Status unchanged with the exception of 3 jelly type brown, soft BMs. Shared MRI with family. Meeting held with Alondra daughter in law, relatives on [place], , TINA Mora regarding plan of care. Goals discussed with options of how to achieve those goals explored.  decides on [facility], will consult them in am.  Patient appears to be very comfortable at this time. Current Facility-Administered Medications Medication Dose Route Frequency  sodium chloride (NS) flush 5-40 mL  5-40 mL IntraVENous Q8H  
 sodium chloride (NS) flush 5-40 mL  5-40 mL IntraVENous PRN  
 cefTRIAXone (ROCEPHIN) 1 g in 0.9% sodium chloride (MBP/ADV) 50 mL  1 g IntraVENous Q24H  acetaminophen (TYLENOL) tablet 650 mg  650 mg Oral Q4H PRN  
 ondansetron (ZOFRAN) injection 4 mg  4 mg IntraVENous Q4H PRN  
 docusate sodium (COLACE) capsule 100 mg  100 mg Oral BID PRN  
 amLODIPine (NORVASC) tablet 5 mg  5 mg Oral DAILY  aspirin chewable tablet 81 mg  81 mg Oral DAILY  lisinopriL (PRINIVIL, ZESTRIL) tablet 20 mg  20 mg Oral DAILY  loratadine (CLARITIN) tablet 10 mg  10 mg Oral DAILY  lidocaine 4 % patch 1 Patch  1 Patch TransDERmal DAILY  montelukast (SINGULAIR) tablet 10 mg  10 mg Oral DAILY  atorvastatin (LIPITOR) tablet 10 mg  10 mg Oral QHS Review of Systems Patient is unable. Objective:  
 
Visit Vitals /82 Pulse 81 Temp 98.8 °F (37.1 °C) Resp 16 Ht 5' 2\" (1.575 m) Wt 67.6 kg (149 lb) SpO2 95% BMI 27.25 kg/m² O2 Device: Room air Temp (24hrs), Av.3 °F (36.8 °C), Min:97.3 °F (36.3 °C), Max:99.3 °F (37.4 °C) 
 
 1901 -  0700 In: -  
Out: 50 [Urine:50] PE: 
General appearance: Dozing in bed, appears comfortable. eating very little, has to be fed. Very frail appearing.  Cooperative, does not seem to have pain when not moving.  Minimal verbalization, seems oblivious to surroundings, recognizes . Xavier point and neat. Head: Normocephalic, without obvious abnormality, atraumatic Eyes: conjunctivae/corneas clear. PERRL Throat: Lips, mucosa, and tongue dry.  Teeth and gums normal 
Neck: supple, symmetrical, trachea midline, no adenopathy, thyroid: not enlarged, symmetric, no tenderness/mass/nodules, no carotid bruit and no JVD Lungs: clear to auscultation bilaterally Heart: regular rate and rhythm, S1, S2 normal, no murmur, click, rub or gallop Abdomen: soft, non-tender. Bowel sounds normal. No masses,  no organomegaly.  Negative pelvic rock. Extremities: All extremities normal, no cyanosis or edema,  
Skin: Skin color, texture, turgor dry, otherwise normal. No rashes or lesions Neurologic: Grossly normal for extensively debilitated patient, generalized weakness, no unilateral deficit.   
 
Data Review Recent Results (from the past 24 hour(s)) METABOLIC PANEL, BASIC Collection Time: 08/30/20  3:16 AM  
Result Value Ref Range Sodium 141 136 - 145 mmol/L Potassium 2.9 (LL) 3.5 - 5.1 mmol/L Chloride 108 (H) 98 - 107 mmol/L  
 CO2 25 21 - 32 mmol/L Anion gap 8 7 - 16 mmol/L Glucose 95 65 - 100 mg/dL BUN 20 8 - 23 MG/DL Creatinine 0.95 0.6 - 1.0 MG/DL  
 GFR est AA >60 >60 ml/min/1.73m2 GFR est non-AA 59 (L) >60 ml/min/1.73m2 Calcium 8.3 8.3 - 10.4 MG/DL  
CBC WITH AUTOMATED DIFF Collection Time: 08/30/20  3:16 AM  
Result Value Ref Range WBC 9.2 4.3 - 11.1 K/uL  
 RBC 3.34 (L) 4.05 - 5.2 M/uL  
 HGB 10.5 (L) 11.7 - 15.4 g/dL HCT 31.4 (L) 35.8 - 46.3 % MCV 94.0 79.6 - 97.8 FL  
 MCH 31.4 26.1 - 32.9 PG  
 MCHC 33.4 31.4 - 35.0 g/dL  
 RDW 14.0 11.9 - 14.6 % PLATELET 298 994 - 476 K/uL MPV 10.3 9.4 - 12.3 FL ABSOLUTE NRBC 0.00 0.0 - 0.2 K/uL  
 DF AUTOMATED NEUTROPHILS 71 43 - 78 % LYMPHOCYTES 15 13 - 44 % MONOCYTES 9 4.0 - 12.0 % EOSINOPHILS 4 0.5 - 7.8 % BASOPHILS 1 0.0 - 2.0 % IMMATURE GRANULOCYTES 0 0.0 - 5.0 %  
 ABS. NEUTROPHILS 6.5 1.7 - 8.2 K/UL  
 ABS. LYMPHOCYTES 1.4 0.5 - 4.6 K/UL  
 ABS. MONOCYTES 0.9 0.1 - 1.3 K/UL  
 ABS. EOSINOPHILS 0.3 0.0 - 0.8 K/UL  
 ABS. BASOPHILS 0.1 0.0 - 0.2 K/UL  
 ABS. IMM. GRANS. 0.0 0.0 - 0.5 K/UL MAGNESIUM Collection Time: 08/30/20  3:16 AM  
Result Value Ref Range Magnesium 2.2 1.8 - 2.4 mg/dL CXR:  No acute findings. 
  
LEFT HIP XRAY:  Lower lumbar degenerative spondylosis is present. Degenerative joint disease is present in the SI joints. The femoral heads are symmetric in contour and density. There is no displaced fracture, malalignment or advanced degenerative change in the left hip.  
IMPRESSION: No acute findings. 
  
8/27:  URINE CULTURE  
 Culture result: Abnormal             Final   
>100,000 COLONIES/mL ESCHERICHIA COLI Susceptibility   
             
Antibiotic  Interpretation  Value  Method  Comment Trimeth-Sulfamethoxa  Susceptible  <=0.5/9.5  ug/mL  ERYN     
Nitrofurantoin  Susceptible  <=16  ug/mL  ERYN     
Ampicillin ($)  Resistant  >16  ug/mL  ERYN     
Gentamicin ($)  Susceptible  <=1  ug/mL  ERYN     
Tobramycin ($)  Susceptible  1  ug/mL  ERYN     
Ampicillin/sulbactam ($)  Susceptible  8/4  ug/mL  ERYN     
Cefazolin ($)  Susceptible  <=1  ug/mL  ERYN     
Ceftriaxone ($)  Susceptible  <=0.5  ug/mL  ERYN     
Cefepime ($$)  Susceptible  <=0.5  ug/mL  ERYN     
Piperacillin/Tazobac ($)  Susceptible  <=2/4  ug/mL  ERYN     
Aztreonam ($$$$)  Susceptible  <=1  ug/mL  ERYN     
Cefoxitin  Susceptible       
 
8/30:  MRI LEFT HIP:   No evidence of acute fracture. Moderate bilateral hip joint effusions. Scattered muscular strain as above. Full-thickness tear of the right gluteus medius tendon measuring approximately 1.5 cm in AP extent. Small volume of fluid in the right and left greater trochanteric bursa. Bilateral tendinosis of the hamstring origins with partial-thickness tearing 
on the right. Moderate lower lumbar spine arthropathy. 
  
Assessment/Plan:  
Acute E Coli UTI  
            Continue Rocephin x 3 days minimum, then po if           discharging for total of 7 days. Culture above               
Anorexia with unknown weight loss:  Advanced dementia 
            Nutrition consult  
            Supplements  
  
  Unintentional weight loss:  Unclear how much             MM above. Has to be fed, eats little 
  
Advanced dementia:  This is the cause for most of her current problems.  
             very reluctant to engage home assist or             rehab. Refuses to acknowledge dementia  8/30:  Family meeting as above,  decides patient  is to go home on hospice 
  
 Intermittent complaints of left hip pain without trauma or fall.   
            Persistent pain with movement 
            PT/OT recommend rehab MRI with accumulative damage as noted above. Will not force movement Profound Debility:   
            PT/OT/CM on board  not acknowledging  
            Fall precautions  
  
FRED due to dehydration  
            Normal saline at 125 ml/hr x first 48 hours, resolving   
            Daily labs, add mag in am  
  
Leukocytosis:  Dehydration and FRED 
            Monitor  
  
Hyperglycemia:  A1C WNL 
  
Hypertension:  Home meds Replace and recheck. Care Plan discussed with: , grand daughtr in law, Jose Francisco Hill CM, relatives on zoom Signed By: Brodie Avina NP August 30, 2020

## 2020-09-01 NOTE — PROGRESS NOTES
Patient discharged on 8/31/20 with [facility]. No DANIELE outreach indicated due to discharge to hospice care.

## 2020-09-03 NOTE — PROGRESS NOTES
Physician Progress Note Breana Thompson 
CSN #:                  [id number] :                       1932 ADMIT DATE:       2020 12:12 PM 
[address] [place] DATE:        2020 2:24 PM 
RESPONDING 
PROVIDER #:        Philemon Soulier MD Gertrude London MD 
 
 
 
 
QUERY TEXT: 
 
Pt admitted with UTI, FRED . Pt noted to have leukocytosis and elevated PCT. If possible, please document in the progress notes and discharge summary if you are evaluating and /or treating any of the following: The medical record reflects the following: 
Risk Factors: 79 yo with fred, uti, anorexia, wt loss Clinical Indicators: urine cx w > 100k colonies e.coli, WBC of 15.5 and a procalcitonin of 2.21. afebrile, bp elevated, hr and resp rate normal during stay Treatment: Reema Torres. Thank you, Carlos Vasquez RN CDS 
[phone] Options provided: 
-- *** (organism, if known) Sepsis, present on admission 
-- *** (organism if known) Sepsis, now resolved, *** 
-- *** (organism if known) Sepsis, not present on admission, 
-- No Sepsis, localized infection only *** (if known) -- Other - I will add my own diagnosis -- Disagree - Not applicable / Not valid -- Disagree - Clinically unable to determine / Unknown 
-- Refer to Clinical Documentation Reviewer PROVIDER RESPONSE TEXT: 
 
This patient has *** (organism, if known) sepsis which was present on admission.  
 
Query created by: Robert Horne on 9/3/2020 3:23 PM 
 
 
Electronically signed by:  Barrera Xiong MD 9/3/2020 3:27 PM

## 2020-09-09 PROBLEM — Z51.5 HOSPICE CARE: Status: ACTIVE | Noted: 2020-01-01

## 2020-10-21 PROBLEM — G30.9 ALZHEIMER'S DEMENTIA WITHOUT BEHAVIORAL DISTURBANCE (HCC): Status: ACTIVE | Noted: 2020-01-01

## 2020-10-21 PROBLEM — F02.80 ALZHEIMER'S DEMENTIA WITHOUT BEHAVIORAL DISTURBANCE (HCC): Status: ACTIVE | Noted: 2020-01-01

## 2020-10-22 NOTE — [facility] IDG CHAPLAIN NOTES
Patient: Melissa Belcher    Date: 10/22/20  Time: 9:46 AM    \[facility]\""  Notes    / Grief Counselor has reviewed  Initial Comprehensive Assessment and plan of care. Bereavement and Spiritual Care Assessments to be completed and plan of care put in place to meet the needs, requests and referrals.         Signed by: Jaden Apodaca

## 2020-10-22 NOTE — [facility] IDG NURSE NOTES
Patient: Ran Cohen    Date: 10/21/20  Time: 9:40 PM    [facility] Nurse Notes    Patient is an 80-year-old female admitted from the in-home program for GIP level of care with a hospice diagnosis of Alzheimer's for management of pain, agitation, and wound care. The patient has had an acceleration of her Alzheimer's debility related to her right gluteus medius, iliopsoas, and hamstring tendon injury. The patient has had more fecal and urinary incontinence and more disorientation. The patient's Stage III wound has also made care for her at home more complex. The patient has come to [facility] for wound care and to better manage the patient's pain and agitation with injectable medications. On admission, patient was noted to be grimacing and moaning. FLACC 5/10. Patient was medicated with PRN Morphine 2 mg SQ for pain and PRN Haldol 2 mg SQ for agitation. On admission, the patient's miles catheter was noted to be leaking. New miles catheter was placed via sterile technique with CNA assist.  Allevyn to sacrum was removed and a wet to moist dressing was applied to the sacrum per orders. Patient tolerated the procedure fair. Patient has no IV access. Patient was alert but was nonverbal with this nurse. Bilateral hands were noted to be contracted. Patient on air mattress for pressure relief. No family was here at time of admission. Medications found in patient suitcase and locked up per facility policy. Admission complete and hospice care plan initiated which includes spiritual, psychosocial, and bereavement. No initial needs identified. IDG team, including MD, made aware of care plans. Volunteer services suspended due to COVID-19.       Signed by: Gt Bonds RN

## 2020-10-22 NOTE — PROGRESS NOTES
2335- assuming care of patient at this time. Report received from Roberta [facility]. patient resting quietly in bed with no s/sx pain or distress. Call light within reach. Bed is low and locked, tab alert in place, and door left open for continuous monitoring. 0025- Patient resting with eyes closed. No s/sx of pain or distress. 0145- Patient resting with eyes closed. No s/sx of pain or distress. 0276- Patient resting with eyes closed. No s/sx of pain or distress. 0402- prn morphine given sq for pain aeb flacc 7/10 during nursing care. Vitals taken and patient turned to L side. Dressing reinforced with tape. Deborah.Echevaria- Patient resting with eyes closed. No s/sx of pain or distress. flacc 0/10.     [id number] Patient resting with eyes closed. No s/sx of pain or distress.       Report given to BLANE Geller

## 2020-10-22 NOTE — PROGRESS NOTES
Problem: Pressure Injury - Risk of  Goal: *Prevention of pressure injury  Description: Document Yeison Scale and appropriate interventions in the flowsheet. Outcome: Progressing Towards Goal  Note: Pressure Injury Interventions:  Sensory Interventions: Assess changes in LOC, Check visual cues for pain, Float heels    Moisture Interventions: Contain wound drainage, Maintain skin hydration (lotion/cream), Apply protective barrier, creams and emollients, Moisture barrier, Limit adult briefs    Activity Interventions: Pressure redistribution bed/mattress(bed type)    Mobility Interventions: Float heels, HOB 30 degrees or less, Pressure redistribution bed/mattress (bed type)                          Problem: Falls - Risk of  Goal: *Absence of Falls  Description: Document Soraya Fall Risk and appropriate interventions in the flowsheet. Outcome: Progressing Towards Goal  Note: Fall Risk Interventions:       Mentation Interventions: Adequate sleep, hydration, pain control, Bed/chair exit alarm, Door open when patient unattended, Evaluate medications/consider consulting pharmacy, More frequent rounding, Room close to nurse's station, Toileting rounds    Medication Interventions: Evaluate medications/consider consulting pharmacy, Bed/chair exit alarm    Elimination Interventions: Bed/chair exit alarm, Toileting schedule/hourly rounds, Call light in reach              Problem: Pain  Goal: *Control of Pain  Description: Dai Velasquez will have pain control AEB a FLACC score of 2 or less. Outcome: Progressing Towards Goal     Problem: Anxiety/Agitation  Goal: Verbalize and demonstrate ability to manage anxiety  Description: The patient/family/caregiver will verbalize and demonstrate ability to manage the patient's anxiety throughout hospice care.   Outcome: Progressing Towards Goal     Problem: Infection - Risk of, Urinary Catheter-Associated Urinary Tract Infection  Goal: *Absence of infection signs and symptoms  Outcome: Progressing Towards Goal

## 2020-10-22 NOTE — H&P
History and Physical    Patient: Pinky Nogueira MRN: [id number]  SSN: [id number]    YOB: 1932  Age: 80 y.o. Sex: female      Subjective:      Pinky Nogueira is a 80 y.o. female who has a hospice diagnosis of Alzheimer's dementia without behavioral disturbance. Hospice associated diagnoses are ataxia, history of hip fracture, sequela of fall, muscle injury, debility and hypertension. She was admitted to in home hospice care at the end of August due to Alzheimer's dementia, FAST level 6. She is bedbound and incontinent of bowel and bladder. She has developed a sacral wound that has progressed to at least a stage 3 classification. She is being admitted today for complex wound care to debride her wound and titration of medication to control pain and agitation. Past Medical History:   Diagnosis Date    Acute UTI 8/27/2020    Allergic rhinitis 12/9/2015    Last Assessment & Plan:  Continue Singulair    Arthritis     Benign paroxysmal positional vertigo 5/23/2018    Last Assessment & Plan:  Did well with PT. Does use Meclizine occasionally for dizziness.  Bilateral shoulder pain 5/27/2016    Last Assessment & Plan:  Sx controlled with more pain meds and she is going to PT which is helping    Chronic pain     back    Debility 8/27/2020    Dementia ([address] 8/27/2020 8/27/20:  RARELY SPEAKS. SHUFFLING X 1 MONTH    Hypertension      Past Surgical History:   Procedure Laterality Date    HX BREAST BIOPSY      HX DILATION AND CURETTAGE        Family History   Problem Relation Age of Onset   Giselle Exon Stroke Mother     Stroke Sister     Cancer Maternal Aunt         bone cancer     Social History     Tobacco Use    Smoking status: Never Smoker    Smokeless tobacco: Never Used   Substance Use Topics    Alcohol use: Never     Frequency: Never      Prior to Admission medications    Medication Sig Start Date End Date Taking?  Authorizing Provider   cephALEXin (KEFLEX) 500 mg capsule Take 500 mg by mouth three (3) times daily. 10/15/20 10/23/20  Provider, Historical   morphine (ROXANOL) 100 mg/5 mL (20 mg/mL) concentrated solution Take 5 mg by mouth every three (3) hours as needed for Pain or Shortness of Breath. Pain unrelieved by Ultram or if unable to give ultram 9/2/20   Provider, Historical   senna (Senna) 8.6 mg tablet Take 1 Tab by mouth two (2) times daily as needed for Constipation. 8/31/20   Provider, Historical   meclizine (ANTIVERT) 25 mg tablet Take 25 mg by mouth three (3) times daily as needed for Dizziness. 8/31/20   Provider, Historical   aspirin 81 mg chewable tablet Take 81 mg by mouth daily. 8/31/20   Provider, Historical   traMADoL (ULTRAM) 50 mg tablet Take 50 mg by mouth every six (6) hours as needed for Pain. may crush tab if needed 9/9/20   Provider, Historical   acetaminophen (TYLENOL) 325 mg/10.15 mL soln solution Take 500 mg by mouth every six (6) hours as needed for Fever or Moderate Pain. 8/31/20   Provider, Historical        Allergies   Allergen Reactions    Niacin Unknown (comments)    Tolectin [Tolmetin] Unknown (comments)    Naproxen Other (comments)     GI IRRITATION       Review of Systems:  Review of systems not obtained due to patient factors. Objective:     Vitals:    10/22/20 0451 10/22/20 1249 10/23/20 0615   BP: (!) 170/72 (!) 144/71 127/62   Pulse: 81 77 78   Resp: 20 16 20   Temp: 100.4 °F (38 °C) 98.6 °F (37 °C) 100.3 °F (37.9 °C)        Physical Exam:  GENERAL: fatigued, cooperative, moderate distress with stimuli, appears older than stated age, pale, cachectic  LUNG: Clear diminished breath sounds with unlabored respirations. HEART: regular rate and rhythm  ABDOMEN: soft, non-tender. Bowel sounds active. : Macias catheter with ya urine. EXTREMITIES:  extremities with no cyanosis or edema. + pulses bilaterally. Upper extremities contracted. SKIN: Pale. Warm to touch. 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    NEUROLOGIC: Agitated with stimuli, screams out when touched. Profound weakness. Bedbound. PSYCHIATRIC: agitated    Assessment:     Hospital Problems  Date Reviewed: 10/23/2020          Codes Class Noted POA    Ataxia ICD-10-CM: R27.0  ICD-9-CM: 781.3  10/23/2020 Unknown        History of hip fracture ICD-10-CM: Z87.81  ICD-9-CM: V15.51  10/23/2020 Unknown        Falls, sequela ICD-10-CM: W19. XXXS  ICD-9-CM: 909.4, E929.3  10/23/2020 Unknown        Muscle injury ICD-10-CM: T14.90XA  ICD-9-CM: 959.9  10/23/2020 Unknown        * (Principal) Alzheimer's dementia without behavioral disturbance ([facility] [address] ICD-10-CM: G30.9, F02.80  ICD-9-CM: 331.0, 294.10  10/21/2020 Unknown        Hospice care ICD-10-CM: Z51.5  ICD-9-CM: V66.7  9/9/2020 Yes        Anorexia (Chronic) ICD-10-CM: R63.0  ICD-9-CM: 783.0  8/27/2020 Yes        Unintentional weight loss (Chronic) ICD-10-CM: R63.4  ICD-9-CM: 783.21  8/27/2020 Yes        Debility (Chronic) ICD-10-CM: R53.81  ICD-9-CM: 799.3  8/27/2020 Yes        Dehydration ICD-10-CM: E86.0  ICD-9-CM: 276.51  8/27/2020 Yes        Hypertension (Chronic) ICD-10-CM: I10  ICD-9-CM: 401.9  8/27/2020 Yes              Plan:     Current Facility-Administered Medications   Medication Dose Route Frequency    fentaNYL (DURAGESIC) 12 mcg/hr patch 1 Patch  1 Patch TransDERmal Q72H    haloperidol lactate (HALDOL) injection 2 mg  2 mg SubCUTAneous Q1H PRN    Or    haloperidol lactate (HALDOL) injection 2 mg  2 mg IntraVENous Q1H PRN    acetaminophen (TYLENOL) suppository 650 mg  650 mg Rectal Q3H PRN    bisacodyL (DULCOLAX) suppository 10 mg  10 mg Rectal PRN    haloperidol lactate (HALDOL) injection 2 mg  2 mg SubCUTAneous Q1H PRN    Or    haloperidol lactate (HALDOL) injection 2 mg  2 mg IntraVENous Q1H PRN    glycopyrrolate (ROBINUL) injection 0.2 mg  0.2 mg SubCUTAneous Q4H PRN    morphine injection 2 mg  2 mg SubCUTAneous Q20MIN PRN    Or    morphine injection 2 mg  2 mg IntraVENous Q20MIN PRN       10/21: [facility] pt-Osiris ARGUELLES) Admitted GIP with Alzheimer's dementia without behavioral disturbance for management of pain, agitation and wound care. 1. Pain: Morphine 2mg IV/SQ Q20 minutes as needed. Fentanyl 12mcg patch in place. 2. Agitation: Haloperidol 2mg IV/SQ Q1 hour prn.    3. Wound Care: Turn and reposition Q2 hours and prn. Wound care as ordered for sacral wound. Air mattress in place for pressure reduction. 4. Family/Pt Support: No family at bedside during exam. Medications and plan of care discussed with nursing staff. Will continue to monitor for symptoms and adjust medications as needed to maintain patient comfort. PPS 10%. Case [facility] with Dr. Jonnie Ray and [facility]. Add Morphine 4mg q20 minutes prn prior to wound care. Wound care orders changed, wound is now a stage 4 with coccyx visible. Add flagyl and miconazole to help control odor.      Signed By: Bentley Zurita NP     October 23, 2020

## 2020-10-22 NOTE — PROGRESS NOTES
Problem: Anticipatory Grief  Goal: Grief heard and acknowledged, anxiety reduced, patient coping identified, patient/family expressed gratitude  Description: Patient/ Family will acknowledge feelings of grief and anxiety and utilize available support including education on anticipatory grief.    Outcome: Progressing Towards Goal

## 2020-10-22 NOTE — PROGRESS NOTES
1900:  Patient arrived via EMS. Patient noted to be in pain on arrival grimacing and moaning. FLACC 5/10. Will medicate as ordered. :  Patient ID by name and . Medicated with PRN Morphine 2 mg SQ for pain and PRN Haldol 2 mg SQ for agitation and to promote comfort. Miles catheter changed due to leakage. New 16 F miles catheter placed via sterile technique with assistance from Alexandra Huerta RN and Ryanne Addison CNA. Wet to Moist dressing applied to Stage III to sacrum as ordered. Patient tolerated fair. Physical assessment complete. Patient positioned for comfort and is on air mattress. Will reassess for pain. 2000:  Reassessment:  Patient resting with no s/sx of pain or agitation observed. FLACC 0/10.    2200:  Patient resting with eyes closed. No s/sx of pain, dyspnea, or agitation observed. FLACC 0/10.      2330:  Report given to Zuleyka Wills RN.

## 2020-10-22 NOTE — PROGRESS NOTES
Background:  Mely Kat. Jasson Velazquez is an 80year old lady who comes to us from the [facility] Home [facility].   She is  to Dr. Babs Mancuso who served as a professor in the [employer] Department at [employer] for approximately 50 years. He has been caring for her at home. They have two sons.  And Mrs. Jasson Velazquez are Christians and affiliated with the [employer]. Chaplain Kaitlyn Patricio has been providing spiritual care in the home. Assessment:  When  entered the room Mrs. Jasson Velazquez was lying quietly in the bed with her eyes closed. She does not appear to be in pain or distress but rather sleeping peacefully.  offered prayer at bedside. Following visit  placed a call to  Patient's . He was not available. Voice mail provided assuring him of support throughout  His wife's time with Nara Ramirez.  shared that she would like to meet him. Assurance of prayer offered. Plan: Focus on Anticipatory Grief. Provide spiritual and emotional support [facility]

## 2020-10-22 NOTE — PROGRESS NOTES
1599: Report received from off going RN. Pt was admitted last evening from [facility] in home program for [facility] level care with a diagnosis of Alzheimer's disease. Pt is needing management for pain, agitation and wound care. She received 1 dose of Haldol and 1 dose of Morphine administered during the night to promote comfort. Presently pt is on an air mattress to relieve pressure, resting with eyes closed, resps even and regular. No s/sx of pain or discomfort observed. FLACC score 0/10.     1006: Pt premedicated with Morphine 2mg SC and Haldol 2mg SC to promote comfort prior to turning and repositioning. Pt wakes to voice, but when awake and y care is provided pt cries. Does not tolerate care or positioning well. Drsg to sacrum at this time is dry and intact. 1030: Pt resting with eyes closed, resps even and regular,. No s/sx of pain or other discomfort observed. FLACC score 0/10.     1255: pt medicated with Morphine 2mg SC and Haldol 2mg SC to promote comfort. Pt with FLACC score of 5/10. Facial grimacing and crying observed when attempting to feed or move patient. 1330: Family in room. Pt now resting with eyes closed, resps even and regular. No s/sx of pain or other discomfort observed. Rated 0/10 per FLACC score. 1530: Family remain at bedside. No voiced requests or concerns. Pt remains in bed with eyes closed, resps even and regular. FLACC score 0/10.    1545: Family leaving, spouse requested to speak with someone regarding spouse's prognosis the next time he comes in. Shared that he has tricks to get the patient to take her PO medications and eat. Attempted to educate spouse that the patient is not alert enough to take PO and has had some swallowing difficulties. Spouse seems to be in denial as he continued to talk about how to engage the patient's swallow reflex. 1607: Pt premedicated with Morphine 2mg SC and Haldol 2mg SC to promote comfort prior to drsg change. No family in room.      1645: Drsg to sacral/coccyx area changed for large amount of yellowish foul smelling drainage. Pt tolerated procedure poorly. Provider aware. 1733: New orders received from provider. Fentanyl 12mcg/hr patch applied to left upper arm. Pt resting with eyes closed, resps are even and regular. No s/sx of pain or other discomfort observed.  FLACC score 0/10.     1840: Report given to oncoming RN

## 2020-10-23 PROBLEM — R27.0 ATAXIA: Status: ACTIVE | Noted: 2020-01-01

## 2020-10-23 PROBLEM — M54.50 CHRONIC BILATERAL LOW BACK PAIN WITHOUT SCIATICA: Status: ACTIVE | Noted: 2018-05-22

## 2020-10-23 PROBLEM — T14.90XA MUSCLE INJURY: Status: ACTIVE | Noted: 2020-01-01

## 2020-10-23 PROBLEM — G89.29 CHRONIC BILATERAL LOW BACK PAIN WITHOUT SCIATICA: Status: ACTIVE | Noted: 2018-05-22
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
## 2020-10-23 PROBLEM — R15.9 INCONTINENCE OF FECES: Status: ACTIVE | Noted: 2020-01-01

## 2020-10-23 PROBLEM — E78.5 DYSLIPIDEMIA: Status: ACTIVE | Noted: 2018-05-11

## 2020-10-23 PROBLEM — F03.90 DEMENTIA (HCC): Status: RESOLVED | Noted: 2020-01-01 | Resolved: 2020-01-01

## 2020-10-23 PROBLEM — H81.10 BENIGN PAROXYSMAL POSITIONAL VERTIGO: Status: ACTIVE | Noted: 2018-05-23

## 2020-10-23 PROBLEM — Z87.81 HISTORY OF HIP FRACTURE: Status: ACTIVE | Noted: 2020-01-01

## 2020-10-23 NOTE — PROGRESS NOTES
Problem: Pressure Injury - Risk of  Goal: *Prevention of pressure injury  Description: the patient will not develop other pressure areas during stay. Outcome: Progressing Towards Goal  Note: Pressure Injury Interventions:  Sensory Interventions: Assess changes in LOC, Avoid rigorous massage over bony prominences, Check visual cues for pain, Float heels, Minimize linen layers    Moisture Interventions: Absorbent underpads, Apply protective barrier, creams and emollients, Limit adult briefs, Minimize layers, Moisture barrier    Activity Interventions: Assess need for specialty bed, Pressure redistribution bed/mattress(bed type)    Mobility Interventions: Assess need for specialty bed, Float heels, HOB 30 degrees or less    Nutrition Interventions: Document food/fluid/supplement intake    Friction and Shear Interventions: Apply protective barrier, creams and emollients, Lift sheet, Minimize layers                Problem: Pain  Goal: *Control of Pain  Description: Gabrielle Ornelas will have pain control AEB a FLACC score of 2 or less. Fentanyl patch scheduled 12 mcg  Morphine 2 mg q 20 min prn. Outcome: Progressing Towards Goal     Problem: Anxiety/Agitation  Goal: Verbalize and demonstrate ability to manage anxiety  Description: the patient's anxiety/agitation will be controlled during shift and patient will have relaxed body features aeb no picking at clothing, groaning, or restlessness.   Haldol 2 mg q 1 hr prn    Outcome: Progressing Towards Goal

## 2020-10-23 NOTE — [facility] NOTES
Patient: Ector Scanlon    Date: 10/23/20  Time: 12:36 PM    [facility]  Notes    Intervention: Ministry of presence. Prayer and completion of assessments. Outcome; Patient resting with eyes closed. Plan: Continue to provide spiritual and emotional support.          Signed by: Johnny Campos

## 2020-10-23 NOTE — PROGRESS NOTES
Follow  Up Visit:    Ms. Ai Bustamante appears to be sleeping soundly. Her  is at bedside. He and  talked briefly. He had a difficult time hearing .  assured him that his wife would continue to receive excellent care.   encouraged him to take advantage of the respite stay and get some much needed rest.

## 2020-10-23 NOTE — PROGRESS NOTES
Problem: Falls - Risk of  Goal: *Absence of Falls  Description: Document Tavares Bryan Fall Risk and appropriate interventions in the flowsheet. Outcome: Progressing Towards Goal  Note: Fall Risk Interventions:       Mentation Interventions: Adequate sleep, hydration, pain control, Door open when patient unattended, Bed/chair exit alarm, Evaluate medications/consider consulting pharmacy    Medication Interventions: Bed/chair exit alarm, Evaluate medications/consider consulting pharmacy    Elimination Interventions: Bed/chair exit alarm, Toileting schedule/hourly rounds              Problem: Pain  Goal: *Control of Pain  Description: Meliza Munson will have pain control AEB a FLACC score of 2 or less.     Outcome: Progressing Towards Goal

## 2020-10-23 NOTE — PROGRESS NOTES
1900-Report received, patient resting quietly in bed with no s/sx pain or distress. Call light within reach. Bed is low and locked, tab alert in place, and door left open for continuous monitoring. 2030- Patient resting with eyes closed. No s/sx of pain or distress. 2215- attempted iv x 2 with no success. Patient tolerated poorly as she moaned, grimaced and guarded. 2230- prn morphine and haldol given sq for pain and agitation prior to another IV attempt and turning. [address], RN placed 22g IV ion R wrist.    2315- patient miles leaking and patient had Smear/small brown incont stool. Marylou care performed and patient turned to L side. Patient tolerated poorly. flacc 9/10 with movement. Settled quickly. 0006- Patient resting with eyes closed. No s/sx of pain or distress. flacc 0/10.    0212- Patient resting with eyes closed. No s/sx of pain or distress. 4450- Patient resting with eyes closed. No s/sx of pain or distress. 0094- Patient resting with eyes closed. No s/sx of pain or distress. 0501- prn morphine and haldol given prior to dsg change for pain and agitation with nursing care. 2662- dsg changed per order. Patient tolerated poorly aeb moaning, grimacing, and rigid- flacc 9/10. Turned to R side. Miles leaking again and 5 cc saline added to bulb. Additional dose of morphine given for pain. 5341- Patient resting with eyes closed. No s/sx of pain or distress. flacc 0/10.      Report given to Melissa Jason

## 2020-10-23 NOTE — [facility] IDG MASTER NOTE
[facility]ospice Interdisciplinary Group Collaborative  Date: 10/23/20  Time: 2:14 PM    ___________________    Patient: Salvador Suggs  Coverage Information:     Payor: [facility] MEDICARE     Plan: [facility] MEDICARE PART A AND B     Subscriber ID: [id number]     Phone Number:   MRN: [id number]    Current Benefit Period: Benefit Period 1  Start Date: 8/31/2020  End Date: 11/28/2020        Hospice Attending Provider: Laura Negron [address] [place] [address]  [id number]  Phone: [phone]  Fax: [phone]    Level of Care: General Inpatient Care      ___________________    Diagnoses: There were no encounter diagnoses.     Current Medications:    Current Facility-Administered Medications:     sodium chloride (NS) flush 3 mL, 3 mL, IntraVENous, Q12H, Marzolf, Kiersten Sheffield, NP    sodium chloride (NS) flush 3 mL, 3 mL, IntraVENous, PRN, Gorge Nava NP    metroNIDAZOLE (FLAGYL) tablet 500 mg, 500 mg, Topical, Q48H **AND** miconazole (MICOTIN) 2 % cream, , Topical, Q48H, NadinefKiersten NP    fentaNYL (DURAGESIC) 12 mcg/hr patch 1 Patch, 1 Patch, TransDERmal, Q72H, Gorge Nava NP, 1 Patch at 10/22/20 1733    haloperidol lactate (HALDOL) injection 2 mg, 2 mg, SubCUTAneous, Q1H PRN **OR** haloperidol lactate (HALDOL) injection 2 mg, 2 mg, IntraVENous, Q1H PRN, Gorge Nava NP    acetaminophen (TYLENOL) suppository 650 mg, 650 mg, Rectal, Q3H PRN, Gorge Nava NP    bisacodyL (DULCOLAX) suppository 10 mg, 10 mg, Rectal, PRN, Gorge Nava NP    haloperidol lactate (HALDOL) injection 2 mg, 2 mg, SubCUTAneous, Q1H PRN, 2 mg at 10/22/20 2230 **OR** haloperidol lactate (HALDOL) injection 2 mg, 2 mg, IntraVENous, Q1H PRN, Gorge Nava NP, 2 mg at 10/23/20 1044    glycopyrrolate (ROBINUL) injection 0.2 mg, 0.2 mg, SubCUTAneous, Q4H PRN, Gorge Nava NP    morphine injection 2 mg, 2 mg, SubCUTAneous, Q20MIN PRN, 2 mg at 10/22/20 7230 **OR** morphine injection 2 mg, 2 mg, IntraVENous, Q20MIN PRN, Ysabel Mercado NP, 2 mg at 10/23/20 1044    Orders:  Orders Placed This Encounter    IP CONSULT TO SPIRITUAL CARE Once on week one, then PRN. For [facility] patients only. For contracted patients, primary hospice will continue to manage spiritual care needs     Once on week one, then PRN. For [facility] patients only. For contracted patients, primary hospice will continue to manage spiritual care needs     Standing Status:   Standing     Number of Occurrences:   1     Order Specific Question:   Reason for Consult: Answer:   Spiritual crisis intervention or per patient or caregiver request    DIET PLEASURE     Standing Status:   Standing     Number of Occurrences:   1     Order Specific Question:   Likes/Dislikes/Preferences     Answer:   HOLD TRAY, thickened liquids    VITAL SIGNS     Standing Status:   Standing     Number of Occurrences:   1    VITAL SIGNS     Standing Status:   Standing     Number of Occurrences:   1    NURSING-MISCELLANEOUS: Comfort Care Measures: Enter comfort measures above. CONTINUOUS     Enter comfort measures above. Standing Status:   Standing     Number of Occurrences:   1     Order Specific Question:   Description of Order:     Answer:   Comfort Care Measures:    JONES CATHETER, CARE     1. Jones Catheter care every shift and PRN  2. Notify Physician of Jones Catheter leakage, occlusion, gross adherent sediment or accidental removal  3. Change Jones 30 days after insertion. 4. May flush catheter prn leakage or gross adherent sediment or mucus. Standing Status:   Standing     Number of Occurrences:   1    BLADDER CHECKS     May scan bladder PRN for urinary retention and or patient discomfort     Standing Status:   Standing     Number of Occurrences:   1    NURSING ASSESSMENT:  SPECIFY Assess for GIP, routine, or respite level of care.  Q SHIFT Routine     Standing Status:   Standing     Number of Occurrences:   1     Order Specific Question:   Please describe the test or procedure you would like to order. Answer:   Assess for GIP, routine, or respite level of care.  PAIN ASSESSMENT Pain and Symptoms: Assess ever 4 hours and PRN, for GIP level of care. PRN Routine     Standing Status:   Standing     Number of Occurrences:   1     Order Specific Question:   Please describe the test or procedure you would like to order. Answer:   Pain and Symptoms: Assess ever 4 hours and PRN, for GIP level of care.  BEDREST, COMPLETE     Standing Status:   Standing     Number of Occurrences:   1    NURSING-MISCELLANEOUS: DME: Please order and place air mattress for pressure reduction. CONTINUOUS     Please order and place air mattress for pressure reduction. Standing Status:   Standing     Number of Occurrences:   1     Order Specific Question:   Description of Order:     Answer:   DME:    NURSING-MISCELLANEOUS: admit 10/21: [facility] ARGUELLES) Admitted GIP with Alzheimer's dementia without behavioral disturbance for management of pain, agitation and wound care. Other Hospice diagnoses: Ataxia (R27.0) History of hip fracture (T31. ...     10/21: [facility] [facility]LL[facility]) Admitted GIP with Alzheimer's dementia without behavioral disturbance for management of pain, agitation and wound care. Other Hospice diagnoses:     Ataxia (R27.0)     History of hip fracture (Z87.81)     Fall, sequela (W19.XXXS)     Muscle injury (T14.90XA)     Debility (R53.81)     Hypertension, essential (I10)     Benefit Period 1  Start Date: 8/31/2020  End Date: 27/44/8643     I certify Laurita Brown has a prognosis for a life expectancy of 6 months or less if the terminal illness runs its normal course.     As evidenced by an 27-year-old woman with precipitous acceleration of debility in Alzheimer's disease directly related to right gluteus medius, iliopsoas and hamstring tendon injury with hemorrhage which appears to be subacute.   E. coli urinary tract infection with sepsis requiring hospitalization 8/24/2020 is another related diagnoses contributing to adverse prognosis for this octogenarian. She has declined to requiring assist in transfers and ambulation, intermittent fecal and urinary incontinence, disorientation in place, time and intention. This is F AST 6 level functional compromise. In the setting of other life-threatening musculoskeletal and infectious complications this woman's life expectancy with care limited to comfort measures is less than 6 months. Her  has chosen to seek to avoid recurrent hospitalizations and to limit further care to comfort measures provided at home through the auspices of [facility]. Blood loss anemia, hypertension, and anorexia are further related problems contributing to poor prognosis        Standing Status:   Standing     Number of Occurrences:   1     Order Specific Question:   Description of Order:     Answer:   admit    WOUND CARE, DRESSING CHANGE     Wound Care:  Location: sacrum  Decubitus Wounds Stage III - Cleanse wound location with wound cleanser, pat dry. Apply metronidazole 500 mg crushed daily mixed with 30 mL anti-fungal cream applied to wound bed. Apply gauze, abd pad and secure with tape. Place chux pad under wound. Change every 2 days and PRN if soiled or not secure. Turn every 2 hours. Standing Status:   Standing     Number of Occurrences:   8    DO NOT RESUSCITATE     Standing Status:   Standing     Number of Occurrences:   1    OXYGEN CANNULA Liters per minute: 2; Indications for O2 therapy: RESPIRATORY DISTRESS PRN Routine     Standing Status:   Standing     Number of Occurrences:   1     Order Specific Question:   Liters per minute:      Answer:   2     Order Specific Question:   Indications for O2 therapy     Answer:   RESPIRATORY DISTRESS    OR Linked Order Group     haloperidol lactate (HALDOL) injection 2 mg     haloperidol lactate (HALDOL) injection 2 mg    acetaminophen (TYLENOL) suppository 650 mg    bisacodyL (DULCOLAX) suppository 10 mg    OR Linked Order Group     haloperidol lactate (HALDOL) injection 2 mg     haloperidol lactate (HALDOL) injection 2 mg    glycopyrrolate (ROBINUL) injection 0.2 mg    OR Linked Order Group     morphine injection 2 mg     morphine injection 2 mg    fentaNYL (DURAGESIC) 12 mcg/hr patch 1 Patch    sodium chloride (NS) flush 3 mL    sodium chloride (NS) flush 3 mL    AND Linked Order Group     metroNIDAZOLE (FLAGYL) tablet 500 mg      Order Specific Question:   Antibiotic Indications      Answer: Other      Order Specific Question:   Other Abx Indication      Answer:   wound care     miconazole (MICOTIN) 2 % cream    INITIAL PHYSICIAN ORDER: HOSPICE Level Of Care: General Inpatient; Reason for Admission: 10/21: [facility] [place] AR[facility]LL[facility]) Admitted GIP with Alzheimer's dementia without behavioral disturbance for management of pain, agitation and wound care. Standing Status:   Standing     Number of Occurrences:   1     Order Specific Question:   Status     Answer:   Hospice     Order Specific Question:   Level Of Care     Answer:   General Inpatient     Order Specific Question:   Reason for Admission     Answer:   10/21: [facility] [place] AR[facility]LLES) Admitted GIP with Alzheimer's dementia without behavioral disturbance for management of pain, agitation and wound care. Order Specific Question:   Inpatient Hospitalization Certified Necessary for the Following Reasons     Answer:   3.  Patient receiving treatment that can only be provided in an inpatient setting (further clarification in H&P documentation)     Order Specific Question:   Admitting Diagnosis     Answer:   Alzheimer's dementia without behavioral disturbance [facility]) [[id number]]     Order Specific Question:   Terminal Prognosis Diagnosis(es)     Answer:   Alzheimer's dementia without behavioral disturbance [facility]) [[id number]]     Order Specific Question:   Admitting Physician     Answer:   Vielka Mallory Order Specific Question:   Attending Physician     Answer:   Tameka Cruz     Order Specific Question:   Discharge Plan:     Answer: Other (Specify)    IP CONSULT TO [facility] [address] patients only. For contracted patients, primary hospice will continue to manage social work needs     Standing Status:   Standing     Number of Occurrences:   1     Order Specific Question:   Reason for Consult: Answer: Once on week one, then PRN for Psychosocial crisis intervention or per patient or caregiver request.       Allergies: Allergies   Allergen Reactions    Niacin Unknown (comments)    Tolectin [Tolmetin] Unknown (comments)    Naproxen Other (comments)     GI IRRITATION       Care Plan:  Multidisciplinary Problems (Active)     Problem: Anticipatory Grief     Dates: Start: 10/22/20       Disciplines: Interdisciplinary    Goal: Grief heard and acknowledged, anxiety reduced, patient coping identified, patient/family expressed gratitude     Dates: Start: 10/22/20   Expected End: 11/06/20       Description: Patient/ Family will acknowledge feelings of grief and anxiety and utilize available support including education on anticipatory grief. Disciplines: Interdisciplinary    Intervention: Assess grief responses     Dates: Start: 10/22/20       Description: Arabella Barron will assess grief reactions with each visit. Intervention: Support grieving process     Dates: Start: 10/22/20       Description: Arabella Barron will support the grief process by providing opportunity for open communication and offering education on anticipatory grief.                        Problem: Anxiety/Agitation     Dates: Start: 10/21/20       Disciplines: Interdisciplinary    Goal: Verbalize and demonstrate ability to manage anxiety     Dates: Start: 10/21/20   Expected End: 11/03/20       Description: The patient/family/caregiver will verbalize and demonstrate ability to manage the patient's anxiety throughout hospice care. Disciplines: Interdisciplinary    Intervention: Assess for anxiety/agitation     Dates: Start: 10/21/20       Description: Assess for signs and symptoms of anxiety and agitation. Intervention: Instruction strategies to reduce anxiety/agitation     Dates: Start: 10/21/20       Description: Instruct patient/caregiver on strategies to reduce anxiety/agitation. Problem: Falls - Risk of     Dates: Start: 10/21/20       Disciplines: Interdisciplinary    Goal: *Absence of Falls     Dates: Start: 10/21/20   Expected End: 11/03/20       Description: Document Raymond Sanders Fall Risk and appropriate interventions in the flowsheet.     Disciplines: Interdisciplinary                Problem: Infection - Risk of, Urinary Catheter-Associated Urinary Tract Infection     Dates: Start: 10/21/20       Disciplines: Interdisciplinary    Goal: *Absence of infection signs and symptoms     Dates: Start: 10/21/20   Expected End: 11/03/20       Disciplines: Interdisciplinary    Intervention: Urinary catheter needs assessment (eg: Impaired neurologic status; post void residual; spinal cord injury; urinary outflow obstruction; urinary retention; urinary incontinence; fluid imbalance)     Dates: Start: 10/21/20             Intervention: Urine assessment (eg: Clarity; color; odor; volume)     Dates: Start: 10/21/20             Intervention: Infection signs and symptoms monitoring - urinary tract (eg: Flank or suprapubic pain; burning; fever; dysuria; frequency; urgency; cloudy/bloody/foul odor urine; confusion/agitation; incontinence)     Dates: Start: 10/21/20             Intervention: Lab monitoring (eg: Urinalysis; culture and sensitivity; complete blood count with differential)     Dates: Start: 10/21/20             Intervention: Urinary catheter management (eg: Closed urinary catheter system maintenance; urinary catheter patency with free downhill flow; perineal care; monitor intake and output) Dates: Start: 10/21/20             Intervention: Urinary catheter discontinuation     Dates: Start: 10/21/20       Description: REMINDER(s):  Urinary catheter discontinuation promptly as indicated; remind physician to remove at or before day 5. Problem: Pain     Dates: Start: 10/21/20       Disciplines: Nurse, Interdisciplinary, RT    Goal: *Control of Pain     Dates: Start: 10/21/20   Expected End: 11/03/20       Description: Ganga Daily will have pain control AEB a FLACC score of 2 or less.       Disciplines: Nurse, Interdisciplinary, RT    Intervention: Assess pain characteristics (eg: Intensity scale; onset; location; quality; severity; duration; frequency; radiation)     Dates: Start: 10/21/20             Intervention: Assess pain management - barriers (eg: Past pain experiences)     Dates: Start: 10/21/20             Intervention: Identify pain expectations (eg: Patient's pain goal; somatic experiences; behavioral changes; affect)     Dates: Start: 10/21/20             Intervention: Identify pain medication concerns (eg: Cultural considerations; addiction concerns)     Dates: Start: 10/21/20             Intervention: Support system identification (eg: Caregiver; community resource; family; friends; Gnosticist; support group)     Dates: Start: 10/21/20             Intervention: Monitor for change in patient condition (eg:  Vital signs changes; changes in level of consciousness; nausea; behavioral changes)     Dates: Start: 10/21/20             Intervention: Medication side-effect assessment     Dates: Start: 10/21/20             Intervention: Pain-relief response reassessment (eg: Frequency based on route of administration; effectiveness)     Dates: Start: 10/21/20                         Problem: Patient Education: Go to Patient Education Activity     Dates: Start: 10/21/20       Disciplines: Interdisciplinary    Goal: Patient/Family Education     Dates: Start: 10/21/20       Disciplines: Interdisciplinary                Problem: Patient Education: Go to Patient Education Activity     Dates: Start: 10/21/20       Disciplines: Interdisciplinary    Goal: Patient/Family Education     Dates: Start: 10/21/20       Disciplines: Interdisciplinary                Problem: Pressure Injury - Risk of     Dates: Start: 10/21/20       Disciplines: Interdisciplinary    Goal: *Prevention of pressure injury     Dates: Start: 10/21/20   Expected End: 11/03/20       Description: Document Yeison Scale and appropriate interventions in the flowsheet.     Disciplines: Interdisciplinary                  Care Plan Problems/Goals      Progressing Towards Goal (6)      *Prevention of pressure injury (Pressure Injury - Risk of)    Disciplines:  Interdisciplinary Expected end:  11/03/20        Outcome: Progressing Towards Goal By Shannan Weston on 10/23/20 0437            *Absence of Falls (Falls - Risk of)    Disciplines:  Interdisciplinary Expected end:  11/03/20        Outcome: Progressing Towards Goal By Bessy Chen RN on 10/23/20 1740            *Control of Pain (Pain)    Disciplines:  Nurse, Interdisciplinary, RT Expected end:  11/03/20        Outcome: Progressing Towards Goal By Bessy Chen RN on 10/23/20 5116            Verbalize and demonstrate ability to manage anxiety (Anxiety/Agitation)    Disciplines:  Interdisciplinary Expected end:  11/03/20        Outcome: Progressing Towards Goal By Shannan Weston on 10/23/20 0437            *Absence of infection signs and symptoms (Infection - Risk of, Urinary Catheter-Associated Urinary Tract Infection)    Disciplines:  Interdisciplinary Expected end:  11/03/20        Outcome: Progressing Towards Goal By Adrianna Aviles RN on 10/21/20 2246            Grief heard and acknowledged, anxiety reduced, patient coping identified, patient/family expressed gratitude (Anticipatory Grief)    Disciplines:  Interdisciplinary Expected end:  11/06/20        Outcome: Progressing Towards Goal By Brianna Healy on 10/22/20 1035                         No Outcome (2)      Patient/Family Education (Patient Education: Go to Patient Education Activity)    Disciplines:  Interdisciplinary Expected end:  -          Patient/Family Education (Patient Education: Go to Patient Education Activity)    Disciplines:  Interdisciplinary Expected end:  -                            ___________________    Care Team Notes          POC/IDG Notes      [facility] IDG Nurse Notes by Roseanna Redd RN at 10/23/20 1413  Version 1 of 1    Author:  Roseanna Redd RN Service:  Hospice and Palliative Care Author Type:  Registered Nurse    Filed:  10/23/20 1414 Date of Service:  10/23/20 1413 Status:  Signed    :  Roseanna Redd RN (Registered Nurse)       Patient: Yesica Flores    Date: 10/23/20  Time: 2:13 PM    [facility] Nurse Notes  1st IDG: Pt is an 51-year-old female with Alzheimers disease who is here GIP level of care from [facility] in home program for management of pain, agitation and wound care. Temp 100.3 ax. Macias with UOP of 25 cc also leaking. PO intake: none. IV access in right wrist.  Wounds: stage III on sacrum. PRN medications: Haldol 2 mg SQ/IV x 7 for agitation, morphine 2 mg IV/SQ x 8 for pain. Scheduled meds:  fentanyl 12 mcg patch added yesterday. Plan: Add antifungal cream/flagyl for wound care with gauze/ABD pad q 48 hours. Comprehensive plan of care reviewed. IDG and pt./family in agreement with plan of care. The IDG identifies through on-going assessment when a change is needed to the POC; the pt/family will receive care and services necessitated by changes in POC. Medications reviewed by the pharmacist and Medical Director.         Signed by: Ajit Rea RN       [address] IDG  Notes by Brianna Healy at 10/23/20 1236  Version 1 of 1    Author:  Brianna Healy Service:  Spiritual Care Author Type:  Pastoral Care    Filed:  10/23/20 1242 Date of Service:  10/23/20 1235 Status:  Signed    :  Addi Xie (Pastoral Care)       Patient: Melissa Belcher    Date: 10/23/20  Time: 12:36 PM    [facility]  Notes    Intervention: Ministry of presence. Prayer and completion of assessments. Outcome; Patient resting with eyes closed. Plan: Continue to provide spiritual and emotional support. Signed by: Jaden Apodaca       [facility] IDG  Notes by Nikky Crowe LMSW at 10/23/20 1049  Version 1 of 1    Author:  Nikky Crowe LMSW Service:  Licensed Clinical  Author Type:      Filed:  10/23/20 1050 Date of Service:  10/23/20 1049 Status:  Signed    :  Nikky Crowe LMSW ()       SW to assess coping and needs each visit and offer availability. [facility] IDG  Notes by Addi Xie at 10/22/20 0946  Version 1 of 1    Author:  Addi Xie Service:  Spiritual Care Author Type:  Pastoral Care    Filed:  10/22/20 0947 Date of Service:  10/22/20 0946 Status:  Signed    :  Addi Xie (Pastoral Care)       Patient: Melissa Belcher    Date: 10/22/20  Time: 9:46 AM    [facility]  Notes    / Grief Counselor has reviewed  Initial Comprehensive Assessment and plan of care. Bereavement and Spiritual Care Assessments to be completed and plan of care put in place to meet the needs, requests and referrals. Signed by: Jaden Apodaca       [facility] IDG Nurse Notes by Jose Armando Suazo RN at 10/21/20 2140  Version 1 of 1    Author:  Jose Armando Suazo RN Service:  -- Author Type:  Registered Nurse    Filed:  10/21/20 2222 Date of Service:  10/21/20 2140 Status:  Signed    :  Jose Armando Suazo RN (Registered Nurse)       Patient: Melissa Belcher    Date: 10/21/20  Time: 9:40 PM    [facility] Nurse Notes    Patient is an 59-year-old female admitted from the in-home program for [facility] level of care with a hospice diagnosis of Alzheimer's for management of pain, agitation, and wound care.   The patient has had an acceleration of her Alzheimer's debility related to her right gluteus medius, iliopsoas, and hamstring tendon injury. The patient has had more fecal and urinary incontinence and more disorientation. The patient's Stage III wound has also made care for her at home more complex. The patient has come to [facility] for wound care and to better manage the patient's pain and agitation with injectable medications. On admission, patient was noted to be grimacing and moaning. FLACC 5/10. Patient was medicated with PRN Morphine 2 mg SQ for pain and PRN Haldol 2 mg SQ for agitation. On admission, the patient's miles catheter was noted to be leaking. New miles catheter was placed via sterile technique with CNA assist.  Allevyn to sacrum was removed and a wet to moist dressing was applied to the sacrum per orders. Patient tolerated the procedure fair. Patient has no IV access. Patient was alert but was nonverbal with this nurse. Bilateral hands were noted to be contracted. Patient on air mattress for pressure relief. No family was here at time of admission. Medications found in patient suitcase and locked up per facility policy. Admission complete and hospice care plan initiated which includes spiritual, psychosocial, and bereavement. No initial needs identified. IDG team, including MD, made aware of care plans. Volunteer services suspended due to COVID-19.       Signed by: Nato Mackenzie RN                Care Team Present:   Team Members Present: (see sign in sheet for attendees)

## 2020-10-23 NOTE — PROGRESS NOTES
2294 - Report received from [address] [place] [address]. Patient identified. Patient GIP level of care with hospice diagnosis of alzheimer's dementia w/o behavioral disturbance. Patient in bed, eyes closed. No s/sx anxiety, agitation, NVD or respiratory distress. FLACC 0/10. Fent 12mcg to L arm verified. Bed in lowest, locked position, call light within reach, tab alert on, and SR up for safety. 1079 - Assessment complete (see flowsheets). PRN haldol 2mg IV given for agitation. PRN morphine 2mg IV given for pain. Patient screeching and moaning out. Has grimace. No s/sx NVD or respiratory distress. 7524 - Re-assessment:  Patient now resting quietly. No grimace noted. 1044 - PRN haldol 2mg IV and PRN morphine 2mg IV given to pre-medicate for repositioning. Patient pulled up and turned with help of CNA. Patient yelling out and agitated with movement. No s/sx NVD or respiratory distress. 1228 - Patient in bed, eyes closed. No s/sx anxiety, agitation, NVD or respiratory distress. FLACC 0/10.  arrived to bedside and requesting to talk to provider. Provider notified. 1431 - Patient in bed, eyes closed. No s/sx anxiety, agitation, NVD or respiratory distress. FLACC 0/10. Deferring dressing change until patient's bath.  remains at bedside. 1631 - PRN haldol 2mg IV and PRN morphine 4mg IV given to pre-medicate for bathing and wound care. CNA at bedside to bathe patient. RN completed wound care per orders. Cocyx bone now visible; NP notified of state change. 1657 - PRN morphine 4mg IV given post wound care for pain. RN assisted CNA with end of bath and changing sheets. 1730 - Patient now resting quietly. No s/sx anxiety, agitation, NVD, respiratory distress, or pain. 1802 - While reviewing vitals, RN noted that patient had temp of 99.6. Heavy blankets pulled back and fan turned on. Will re-assess.     Report given to [facility] RN

## 2020-10-24 NOTE — PROGRESS NOTES
Problem: Pain  Goal: *Control of Pain  Description: Lorren Burkitt will have pain control AEB a FLACC score of 2 or less.     Outcome: Progressing Towards Goal    PRN morphine 2mg IV q20min   PRN morphine 4mg IV pre and post wound care  Fent 12mcg to L arm Burow's Advancement Flap Text: The defect edges were debeveled with a #15 scalpel blade.  Given the location of the defect and the proximity to free margins a Burow's advancement flap was deemed most appropriate.  Using a sterile surgical marker, the appropriate advancement flap was drawn incorporating the defect and placing the expected incisions within the relaxed skin tension lines where possible.    The area thus outlined was incised deep to adipose tissue with a #15 scalpel blade.  The skin margins were undermined to an appropriate distance in all directions utilizing iris scissors.

## 2020-10-24 NOTE — PROGRESS NOTES
7655 - [facility] Patient identified. Patient GIP level of care with hospice diagnosis of alzheimer's dementia w/o behavioral disturbance. Patient in bed, eyes closed. No s/sx anxiety, agitation, NVD or respiratory distress. FLACC 0/10. Fent 12mcg to L arm verified. Bed in lowest, locked position, call light within reach, tab alert on, and SR up for safety. 5672 -  Assessment complete (see flowsheets). PRN morphine 2mg IV and PRN haldol 2mg IV given to pre-medicate for bathing/turning. 4927 - PRN morphine 4mg IV given to pre-medicate for wound care. Dressing saturated as well as chux pad underneath. Wound car complete per orders. 8628 - Morphine 4mg IV given post-wound care per orders. Patient crying and grimacing.    0900 - Re-assessment:  Patient resting quietly. FLACC 0/10. No s/sx anxiety, agitation, NVD or respiratory distress. 1055 - Patient resting quietly, eyes closed. FLACC 0/10. No s/sx anxiety, agitation, NVD or respiratory distress. 1257 - Patient resting quietly, eyes closed. FLACC 0/10. No s/sx anxiety, agitation, NVD or respiratory distress. 1447 - Patient resting quietly, eyes closed. FLACC 0/10. No s/sx anxiety, agitation, NVD or respiratory distress.  at bedside. 1714 - Patient resting quietly, eyes closed. FLACC 0/10. No s/sx anxiety, agitation, NVD or respiratory distress.  at bedside.     Report given to [facility] RN

## 2020-10-24 NOTE — PROGRESS NOTES
3142 Report received from Oliverio Diane RN. Pt identified by name and . Pt is under GIP care with a hospice diagnosis of Alzheimer's dementia without behavioral disrurbance. Pt being managed for pain, agitation, wound care. Pt in bed with eyes closed; no signs of pain or distress ntoed. No s/sx anxiety, agitation, NVD, or SOB. Fent 12mcg to L arm and verified. Bed in lowest, locked position with siderails x2; call light within reach and tab alert in place. Pt room near nurses station for safety and observation. FLACC 0/10.     2049 Pt resting in bed; noted facial audible moans and some restlessness noted. PRN Haldol and Morphine given for restlessness and pain. RR non labored. No s/sx NVD or SOB. FLACC 5/10.    2120 Pt resting in bed with eyes closed; no signs of pain or distress noted. RR non labored. No s/sx NVD or SOB. FLACC 0/10.    2332 Pt resting in bed with eyes closed; no signs of pain or distress noted. RR non labored. No s/sx NVD or SOB. FLACC 0/10.    0114 Pt resting in bed with eyes closed; no signs of pain or distress noted. RR non labored. No s/sx NVD or SOB. Pt repositioned for comfort and skin integrity. FLACC 0/10.    0318 Pt resting in bed with eyes closed; no signs of pain or distress noted. RR non labored. No s/sx NVD or SOB. FLACC 0/10.    0515 Pt resting in bed; noted facial audible moans. PRN Morphine given for restlessness and pain. RR non labored. No s/sx NVD or SOB. FLACC 5/10.    0600  Pt resting in bed with eyes closed; no signs of pain or distress noted. RR non labored. No s/sx NVD or SOB. FLACC 010. Report given to Oliverio Diane RN.

## 2020-10-24 NOTE — PROGRESS NOTES
Demographics     Information provided by: spouse Dr. Ai Bustamante    Name:                                                                  Level of Care  GIP [x] Routine  [] Respite   []           From the in home program Yes [x] No []  Team                                                        Diagnosis: Alzheimer's dementia        Insurance    Medicare [x]   Medicaid  []  Blue Cross  []      Other []              Social    [x]   Single []     []    []    Spouse name: Sarah Cruz  Length of marriage: 79 years  Children: 2 sons, one in [place] and one in [place]. There is also a granddaughter in law that lives locally and helps as needed           [employer] Used in the Home prior to admission Yes []  No [x]      Financial Concerns :                     Ilda Application Needed   Yes []  No [x]     Medicaid application needed Yes []  No [x]                                   IFA Form Complete  Yes [x]   No []    Discharge Plans:   Home with spouse. Spouse states he does not feel that pt will be able to return home. Spouse also reports that he has been feeling overwhelmed by pt care. Work History :  Retired [x] [employer] [] Part-time [] Disabled []        [employer]   Yes []  No [x]  [employer] []   [employer] [] [employer] [] [facility] []  [employer] []  [employer] []  Linked to [place]   Yes []  No []  Referral made to [employer] Yes [] No []        Advanced Directives Scanned in the system     HCPOA     Yes  [x]  No []   DPOA        Yes  [x]  No []  DNR           Yes [x]  No []       Spiritual / Catholic Support: Both pt and  worked at [employer] and are members         Final Arrangements: [employer] History and/or Narrative copied from the patients chart from the [address] [place] [address] or Rn:    Pt was admitted last evening from [facility] in home program for [facility] level care with a diagnosis of Alzheimer's disease.  Pt is needing management for pain, agitation and wound care. She received 1 dose of Haldol and 1 dose of Morphine administered during the night to promote comfort. Presently pt is on an air mattress to relieve pressure, resting with eyes closed, resps even and regular. No s/sx of pain or discomfort observed. FLACC score 0/10      Volunteer discussion: Yes []    No [x] No volunteer option due to [place] of care for the patient and family: Spouse expects pt to pass in the house, although he is prepared to care for her at home if needed        Coping and Bereavement:       Spouse is coping appropriately                      Was a Referral made to Bereavement  Yes [] No [x]    Pt is cared for at home by spouse Marguerite Jarrett. Dr. Gonzalez Nearcarlos is tearful and coping appropriately and states he does not expect pt to be able to return home. Spouse states he has been overwhelmed by pt care. Pt's two sons live out of town and only a granddaughter in law lives close enough to help regularly. Active listening and support provided. No further needs reported at this time.

## 2020-10-25 NOTE — PROGRESS NOTES
8962 - Report received from [facility] identified.  Patient GIP level of care with hospice diagnosis of alzheimer's dementia w/o behavioral disturbance.  Patient in bed, eyes closed.  No s/sx anxiety, agitation, NVD or respiratory distress.  FLACC 0/10.  Fent 12mcg to L arm verified.  Bed in lowest, locked position, call light within reach, tab alert on, and SR up for safety. 0709 - PRN morphine 2mg IV and PRN haldol 2mg IV given to pre-medicate for bathing. 8886 - When CNA starting bath, patient found to be saturated with urine. Dressing to sacrum saturated as well. PRN morphine 4mg IV given to pre-medicate for wound care. Wound care complete per orders. Patient moaning out and grimacing. Will medicate again with post wound-care morphine order when time. 5200 - PRN morphine 4mg IV given post wound care. Patient still has grimace. No s/sx anxiety, agitation, NVD or respiratory distress. 1214 - Re-assessment:  Patient now appears relaxed. FLACC 0/10.    1100 - Patient in bed, eyes closed.  No s/sx anxiety, agitation, NVD or respiratory distress.  FLACC 0/10.    1245 - Patient in bed, eyes closed.  No s/sx anxiety, agitation, NVD or respiratory distress.  FLACC 0/10.  now at bedside. 1455 - Patient in bed, eyes closed.  No s/sx anxiety, agitation, NVD or respiratory distress.  FLACC 0/10.  at bedside. 1720 - Patient became in pain and agitated with CNA moving her. Upon arriving to bedside to administer 2mg morphine IV for pain and 2mg haldol for agitation, notified by CNA that miles is leaking and dressing soiled. PRN morphine 4mg IV given as well to pre-medicate for wound care. Wound care complete per orders. Miles flushed and return noted. IV noted to be leaking and removed. Patient pulled up and repositioned with help of CNA. Fent to L chest applied and old fent wasted with Cherellelissa Yañez RN. 9268 - Re-assessment:  Patient now resting quietly.   Face relaxed. No s/sx anxiety, agitation, NVD or respiratory distress. FLACC 0/10.     Report given to [facility] RN

## 2020-10-25 NOTE — PROGRESS NOTES
1850 Report received from Serina Clifton RN. Pt identified by name and . Pt is under GIP care with a hospice diagnosis of Alzheimer's dementia without behavioral disrurbance. Pt being managed for pain, agitation, wound care. Pt in bed with eyes closed; no signs of pain or distress ntoed.  No s/sx anxiety, agitation, NVD, or SOB. Fent 12mcg to Left arm and verified.  Bed in lowest, locked position with siderails x2; call light within reach and tab alert in place. Pt room near nurses station for safety and observation. FLACC 0/10.      Pt resting in bed with eyes closed; no signs of pain or distress noted. RR non labored. No s/sx NVD or SOB. FLACC 0/10.    2114 Assisted CNA with brief change. Pt medicated for pain prior to brief change. PRN Morphine given for pain. RR non labored. No s/sx NVD or SOB. FLACC 0/10.    2311 Pt resting in bed with eyes closed; no signs of pain or distress noted. RR non labored. No s/sx NVD or SOB. FLACC 0/10.    0201 Pt resting in bed with eyes closed; no signs of pain or distress noted. RR non labored. No s/sx NVD or SOB. FLACC 0/10.    0451  Pt resting in bed with eyes closed; no signs of pain or distress noted. RR non labored. No s/sx NVD or SOB. FLACC 0/10.    1941 Assisted with brief change and vitals. Pt given pain medication prior to care. PRN Morphine given for pain. RR non labored. No s/sx NVD or SOB. FLACC 010. Report given to Serina Clifton RN.

## 2020-10-25 NOTE — PROGRESS NOTES
Problem: Pain  Goal: *Control of Pain  Description: Irwin Martinez will have pain control AEB a FLACC score of 2 or less. Outcome: Progressing Towards Goal     Problem: Anxiety/Agitation  Goal: Verbalize and demonstrate ability to manage anxiety  Description: The patient/family/caregiver will verbalize and demonstrate ability to manage the patient's anxiety throughout hospice care.   Outcome: Progressing Towards Goal

## 2020-10-25 NOTE — PROGRESS NOTES
Problem: Pressure Injury - Risk of  Goal: *Prevention of pressure injury  Description: Document Yeison Scale and appropriate interventions in the flowsheet. Outcome: Progressing Towards Goal  Note: Pressure Injury Interventions:  Sensory Interventions: Assess changes in LOC, Keep linens dry and wrinkle-free, Minimize linen layers, Pressure redistribution bed/mattress (bed type)    Moisture Interventions: Absorbent underpads, Maintain skin hydration (lotion/cream), Minimize layers    Activity Interventions: Assess need for specialty bed, Pressure redistribution bed/mattress(bed type)    Mobility Interventions: Assess need for specialty bed, HOB 30 degrees or less, Float heels, Pressure redistribution bed/mattress (bed type)    Nutrition Interventions: Document food/fluid/supplement intake    Friction and Shear Interventions: Apply protective barrier, creams and emollients, HOB 30 degrees or less, Minimize layers                Problem: Patient Education: Go to Patient Education Activity  Goal: Patient/Family Education  Outcome: Progressing Towards Goal     Problem: Falls - Risk of  Goal: *Absence of Falls  Description: Document Soraya Fall Risk and appropriate interventions in the flowsheet. Outcome: Progressing Towards Goal  Note: Fall Risk Interventions:       Mentation Interventions: Adequate sleep, hydration, pain control, Bed/chair exit alarm, Door open when patient unattended    Medication Interventions: Bed/chair exit alarm    Elimination Interventions: Bed/chair exit alarm, Call light in reach              Problem: Patient Education: Go to Patient Education Activity  Goal: Patient/Family Education  Outcome: Progressing Towards Goal     Problem: Pain  Goal: *Control of Pain  Description: Lamount Sprung will have pain control AEB a FLACC score of 2 or less. Outcome: Progressing Towards Goal     Problem: Anxiety/Agitation  Goal: Verbalize and demonstrate ability to manage anxiety  Description:  The patient/family/caregiver will verbalize and demonstrate ability to manage the patient's anxiety throughout hospice care. Outcome: Progressing Towards Goal     Problem: Infection - Risk of, Urinary Catheter-Associated Urinary Tract Infection  Goal: *Absence of infection signs and symptoms  Outcome: Progressing Towards Goal     Problem: Anticipatory Grief  Goal: Grief heard and acknowledged, anxiety reduced, patient coping identified, patient/family expressed gratitude  Description: Patient/ Family will acknowledge feelings of grief and anxiety and utilize available support including education on anticipatory grief.    Outcome: Progressing Towards Goal

## 2020-10-26 NOTE — PROGRESS NOTES
Problem: Pressure Injury - Risk of  Goal: *Prevention of pressure injury  Description: Document Yeison Scale and appropriate interventions in the flowsheet. Outcome: Progressing Towards Goal  Note: Pressure Injury Interventions:  Sensory Interventions: Assess changes in LOC, Keep linens dry and wrinkle-free, Minimize linen layers, Pressure redistribution bed/mattress (bed type)    Moisture Interventions: Absorbent underpads, Maintain skin hydration (lotion/cream), Minimize layers    Activity Interventions: Assess need for specialty bed, Pressure redistribution bed/mattress(bed type)    Mobility Interventions: Assess need for specialty bed, HOB 30 degrees or less, Float heels, Pressure redistribution bed/mattress (bed type)    Nutrition Interventions: Document food/fluid/supplement intake    Friction and Shear Interventions: Apply protective barrier, creams and emollients, HOB 30 degrees or less, Minimize layers                Problem: Patient Education: Go to Patient Education Activity  Goal: Patient/Family Education  Outcome: Progressing Towards Goal     Problem: Falls - Risk of  Goal: *Absence of Falls  Description: Document Soraya Fall Risk and appropriate interventions in the flowsheet. Outcome: Progressing Towards Goal  Note: Fall Risk Interventions:       Mentation Interventions: Adequate sleep, hydration, pain control, Bed/chair exit alarm, Door open when patient unattended    Medication Interventions: Bed/chair exit alarm    Elimination Interventions: Bed/chair exit alarm, Call light in reach              Problem: Patient Education: Go to Patient Education Activity  Goal: Patient/Family Education  Outcome: Progressing Towards Goal     Problem: Pain  Goal: *Control of Pain  Description: [place] will have pain control AEB a FLACC score of 2 or less. Outcome: Progressing Towards Goal     Problem: Anxiety/Agitation  Goal: Verbalize and demonstrate ability to manage anxiety  Description:  The patient/family/caregiver will verbalize and demonstrate ability to manage the patient's anxiety throughout hospice care. Outcome: Progressing Towards Goal     Problem: Infection - Risk of, Urinary Catheter-Associated Urinary Tract Infection  Goal: *Absence of infection signs and symptoms  Outcome: Progressing Towards Goal     Problem: Anticipatory Grief  Goal: Grief heard and acknowledged, anxiety reduced, patient coping identified, patient/family expressed gratitude  Description: Patient/ Family will acknowledge feelings of grief and anxiety and utilize available support including education on anticipatory grief.    Outcome: Progressing Towards Goal

## 2020-10-26 NOTE — [facility] IDG MASTER NOTE
[facility]  Date: 10/26/20  Time: 1:01 PM    ___________________    Patient: Elicia Ng  Coverage Information:     Payor: [place]ARE     Plan: [place] MED[place]ARE PART A AND B     Subscriber ID: [id number]     Phone Number:   MRN: [id number]    Current Benefit Period: Benefit Period 1  Start Date: 8/31/2020  End Date: 11/28/2020      Hospice Attending Provider: Lieutenant Benavides [address] [place] [address]  [id number]  Phone: [phone]  Fax: [phone]    Level of Care: General Inpatient Care      ___________________    Diagnoses: There were no encounter diagnoses.     Current Medications:    Current Facility-Administered Medications:     morphine injection 4 mg, 4 mg, SubCUTAneous, Q20MIN PRN, Lieutenant Makayla MD    metroNIDAZOLE (FLAGYL) tablet 500 mg, 500 mg, Topical, Q12H **AND** miconazole (MICOTIN) 2 % cream, , Topical, Q12H, Adolfo Butts NP    fentaNYL (DURAGESIC) 12 mcg/hr patch 1 Patch, 1 Patch, TransDERmal, Q72H, Nima Mazariegos NP, 1 Patch at 10/25/20 1721    haloperidol lactate (HALDOL) injection 2 mg, 2 mg, SubCUTAneous, Q1H PRN **OR** [DISCONTINUED] haloperidol lactate (HALDOL) injection 2 mg, 2 mg, IntraVENous, Q1H PRN, Nima Mazariegos NP, 2 mg at 10/23/20 2049    acetaminophen (TYLENOL) suppository 650 mg, 650 mg, Rectal, Q3H PRN, Nima Mazariegos NP    bisacodyL (DULCOLAX) suppository 10 mg, 10 mg, Rectal, PRN, Nima Mazariegos NP    haloperidol lactate (HALDOL) injection 2 mg, 2 mg, SubCUTAneous, Q1H PRN, 2 mg at 10/26/20 0749 **OR** [DISCONTINUED] haloperidol lactate (HALDOL) injection 2 mg, 2 mg, IntraVENous, Q1H PRN, Nima Mazariegos NP, 2 mg at 10/25/20 1720    glycopyrrolate (ROBINUL) injection 0.2 mg, 0.2 mg, SubCUTAneous, Q4H PRN, Nima Mazariegos NP    morphine injection 2 mg, 2 mg, SubCUTAneous, Q20MIN PRN, 2 mg at 10/26/20 0541 **OR** [DISCONTINUED] morphine injection 2 mg, 2 mg, IntraVENous, Q20MIN PRN, Nima Mazariegos NP, 2 mg at 10/25/20 1720    Orders:  Orders Placed This Encounter    IP CONSULT TO SPIRITUAL CARE Once on week one, then PRN. For [facility] patients only. For contracted patients, primary hospice will continue to manage spiritual care needs     Once on week one, then PRN. For [facility] patients only. For contracted patients, primary hospice will continue to manage spiritual care needs     Standing Status:   Standing     Number of Occurrences:   1     Order Specific Question:   Reason for Consult: Answer:   Spiritual crisis intervention or per patient or caregiver request    DIET PLEASURE     Standing Status:   Standing     Number of Occurrences:   1     Order Specific Question:   Likes/Dislikes/Preferences     Answer:   HOLD TRAY, thickened liquids    VITAL SIGNS     Standing Status:   Standing     Number of Occurrences:   1    VITAL SIGNS     Standing Status:   Standing     Number of Occurrences:   1    NURSING-MISCELLANEOUS: Comfort Care Measures: Enter comfort measures above. CONTINUOUS     Enter comfort measures above. Standing Status:   Standing     Number of Occurrences:   1     Order Specific Question:   Description of Order:     Answer:   Comfort Care Measures:    JONES CATHETER, CARE     1. Jones Catheter care every shift and PRN  2. Notify Physician of Jones Catheter leakage, occlusion, gross adherent sediment or accidental removal  3. Change Jones 30 days after insertion. 4. May flush catheter prn leakage or gross adherent sediment or mucus. Standing Status:   Standing     Number of Occurrences:   1    BLADDER CHECKS     May scan bladder PRN for urinary retention and or patient discomfort     Standing Status:   Standing     Number of Occurrences:   1    NURSING ASSESSMENT:  SPECIFY Assess for GIP, routine, or respite level of care.  Q SHIFT Routine     Standing Status:   Standing     Number of Occurrences:   1     Order Specific Question:   Please describe the test or procedure you would like to order. Answer:   Assess for GIP, routine, or respite level of care.  PAIN ASSESSMENT Pain and Symptoms: Assess ever 4 hours and PRN, for GIP level of care. PRN Routine     Standing Status:   Standing     Number of Occurrences:   1     Order Specific Question:   Please describe the test or procedure you would like to order. Answer:   Pain and Symptoms: Assess ever 4 hours and PRN, for GIP level of care.  BEDREST, COMPLETE     Standing Status:   Standing     Number of Occurrences:   1    NURSING-MISCELLANEOUS: DME: Please order and place air mattress for pressure reduction. CONTINUOUS     Please order and place air mattress for pressure reduction. Standing Status:   Standing     Number of Occurrences:   1     Order Specific Question:   Description of Order:     Answer:   DME:    NURSING-MISCELLANEOUS: admit 10/21: [facility] [place] ARGUELLES) Admitted GIP with Alzheimer's dementia without behavioral disturbance for management of pain, agitation and wound care. Other Hospice diagnoses: Ataxia (R27.0) History of hip fracture (C86. ...     10/21: [facility] ARGUELLES) Admitted GIP with Alzheimer's dementia without behavioral disturbance for management of pain, agitation and wound care. Other Hospice diagnoses:     Ataxia (R27.0)     History of hip fracture (Z87.81)     Fall, sequela (W19.XXXS)     Muscle injury (T14.90XA)     Debility (R53.81)     Hypertension, essential (I10)     Benefit Period 1  Start Date: 8/31/2020  End Date: 99/87/1076     I certify Lidia Corona has a prognosis for a life expectancy of 6 months or less if the terminal illness runs its normal course.     As evidenced by an 19-year-old woman with precipitous acceleration of debility in Alzheimer's disease directly related to right gluteus medius, iliopsoas and hamstring tendon injury with hemorrhage which appears to be subacute.   E. coli urinary tract infection with sepsis requiring hospitalization 8/24/2020 is another related diagnoses contributing to adverse prognosis for this octogenarian. She has declined to requiring assist in transfers and ambulation, intermittent fecal and urinary incontinence, disorientation in place, time and intention. This is F AST 6 level functional compromise. In the setting of other life-threatening musculoskeletal and infectious complications this woman's life expectancy with care limited to comfort measures is less than 6 months. Her  has chosen to seek to avoid recurrent hospitalizations and to limit further care to comfort measures provided at home through the auspices of [facility]. Blood loss anemia, hypertension, and anorexia are further related problems contributing to poor prognosis        Standing Status:   Standing     Number of Occurrences:   1     Order Specific Question:   Description of Order:     Answer:   admit    WOUND CARE, DRESSING CHANGE     Wound Care:  Location: sacrum  Decubitus Wounds Stage IV - Cleanse wound location with wound cleanser, pat dry. Apply metronidazole 500 mg crushed daily mixed with 30 mL anti-fungal cream applied to wound bed. Apply gauze, calcium alginate dressing, abd pad and secure with tape. Place chux pad under wound. Change q12 and PRN if soiled or not secure. Turn every 2 hours. Standing Status:   Standing     Number of Occurrences:   29    DO NOT RESUSCITATE     Standing Status:   Standing     Number of Occurrences:   1    OXYGEN CANNULA Liters per minute: 2; Indications for O2 therapy: RESPIRATORY DISTRESS PRN Routine     Standing Status:   Standing     Number of Occurrences:   1     Order Specific Question:   Liters per minute:      Answer:   2     Order Specific Question:   Indications for O2 therapy     Answer:   RESPIRATORY DISTRESS    haloperidol lactate (HALDOL) injection 2 mg    DISCONTD: haloperidol lactate (HALDOL) injection 2 mg    acetaminophen (TYLENOL) suppository 650 mg    bisacodyL (DULCOLAX) suppository 10 mg    haloperidol lactate (HALDOL) injection 2 mg    DISCONTD: haloperidol lactate (HALDOL) injection 2 mg    glycopyrrolate (ROBINUL) injection 0.2 mg    morphine injection 2 mg    DISCONTD: morphine injection 2 mg    fentaNYL (DURAGESIC) 12 mcg/hr patch 1 Patch    DISCONTD: sodium chloride (NS) flush 3 mL    DISCONTD: sodium chloride (NS) flush 3 mL    DISCONTD: metroNIDAZOLE (FLAGYL) tablet 500 mg     Order Specific Question:   Antibiotic Indications     Answer: Other     Order Specific Question:   Other Abx Indication     Answer:   wound care    DISCONTD: miconazole (MICOTIN) 2 % cream    DISCONTD: morphine injection 4 mg    DISCONTD: metroNIDAZOLE (FLAGYL) tablet 500 mg     Order Specific Question:   Antibiotic Indications     Answer: Other     Order Specific Question:   Other Abx Indication     Answer:   wound care     Order Specific Question:   Suspected Organism(s)     Answer:   anaerobes in necrosis    DISCONTD: miconazole (MICOTIN) 2 % cream    morphine injection 4 mg    AND Linked Order Group     metroNIDAZOLE (FLAGYL) tablet 500 mg      Order Specific Question:   Antibiotic Indications      Answer: Other      Order Specific Question:   Other Abx Indication      Answer:   wound care      Order Specific Question:   Suspected Organism(s)      Answer:   anaerobes in necrosis     miconazole (MICOTIN) 2 % cream    INITIAL PHYSICIAN ORDER: HOSPICE Level Of Care: General Inpatient; Reason for Admission: 10/21: [facility] [place] ARGUELLES) Admitted GIP with Alzheimer's dementia without behavioral disturbance for management of pain, agitation and wound care.      Standing Status:   Standing     Number of Occurrences:   1     Order Specific Question:   Status     Answer:   Hospice     Order Specific Question:   Level Of Care     Answer:   General Inpatient     Order Specific Question:   Reason for Admission     Answer:   10/21: [facility] [place] ARGUELLES) Admitted GIP with Alzheimer's dementia without behavioral disturbance for management of pain, agitation and wound care. Order Specific Question:   Inpatient Hospitalization Certified Necessary for the Following Reasons     Answer:   3. Patient receiving treatment that can only be provided in an inpatient setting (further clarification in H&P documentation)     Order Specific Question:   Admitting Diagnosis     Answer:   Alzheimer's dementia without behavioral disturbance [facility]) [[id number]]     Order Specific Question:   Terminal Prognosis Diagnosis(es)     Answer:   Alzheimer's dementia without behavioral disturbance [facility]) [[id number]]     Order Specific Question:   Admitting Physician     Answer:   Yvon Martinez     Order Specific Question:   Attending Physician     Answer:   Yvon Martinez     Order Specific Question:   Discharge Plan:     Answer: Other (Specify)    IP CONSULT TO [address] [place] [address] patients only. For contracted patients, primary hospice will continue to manage social work needs     Standing Status:   Standing     Number of Occurrences:   1     Order Specific Question:   Reason for Consult: Answer: Once on week one, then PRN for Psychosocial crisis intervention or per patient or caregiver request.       Allergies: Allergies   Allergen Reactions    Niacin Unknown (comments)    Tolectin [Tolmetin] Unknown (comments)    Naproxen Other (comments)     GI IRRITATION       Care Plan:  Multidisciplinary Problems (Active)     Problem: Anticipatory Grief     Dates: Start: 10/22/20       Disciplines: Interdisciplinary    Goal: Grief heard and acknowledged, anxiety reduced, patient coping identified, patient/family expressed gratitude     Dates: Start: 10/22/20   Expected End: 11/06/20       Description: Patient/ Family will acknowledge feelings of grief and anxiety and utilize available support including education on anticipatory grief.      Disciplines: Interdisciplinary    Intervention: Assess grief responses     Dates: Start: 10/22/20       Description: [address] will assess grief reactions with each visit. Intervention: Support grieving process     Dates: Start: 10/22/20       Description: [address] will support the grief process by providing opportunity for open communication and offering education on anticipatory grief. Problem: Anxiety/Agitation     Dates: Start: 10/21/20       Disciplines: Interdisciplinary    Goal: Verbalize and demonstrate ability to manage anxiety     Dates: Start: 10/21/20   Expected End: 11/03/20       Description: The patient/family/caregiver will verbalize and demonstrate ability to manage the patient's anxiety throughout hospice care. Disciplines: Interdisciplinary    Intervention: Assess for anxiety/agitation     Dates: Start: 10/21/20       Description: Assess for signs and symptoms of anxiety and agitation. Intervention: Instruction strategies to reduce anxiety/agitation     Dates: Start: 10/21/20       Description: Instruct patient/caregiver on strategies to reduce anxiety/agitation. Problem: Falls - Risk of     Dates: Start: 10/21/20       Disciplines: Interdisciplinary    Goal: *Absence of Falls     Dates: Start: 10/21/20   Expected End: 11/03/20       Description: Document Matt Going Fall Risk and appropriate interventions in the flowsheet.     Disciplines: Interdisciplinary                Problem: Infection - Risk of, Urinary Catheter-Associated Urinary Tract Infection     Dates: Start: 10/21/20       Disciplines: Interdisciplinary    Goal: *Absence of infection signs and symptoms     Dates: Start: 10/21/20   Expected End: 11/03/20       Disciplines: Interdisciplinary    Intervention: Urinary catheter needs assessment (eg: Impaired neurologic status; post void residual; spinal cord injury; urinary outflow obstruction; urinary retention; urinary incontinence; fluid imbalance)     Dates: Start: 10/21/20 Intervention: Urine assessment (eg: Clarity; color; odor; volume)     Dates: Start: 10/21/20             Intervention: Infection signs and symptoms monitoring - urinary tract (eg: Flank or suprapubic pain; burning; fever; dysuria; frequency; urgency; cloudy/bloody/foul odor urine; confusion/agitation; incontinence)     Dates: Start: 10/21/20             Intervention: Lab monitoring (eg: Urinalysis; culture and sensitivity; complete blood count with differential)     Dates: Start: 10/21/20             Intervention: Urinary catheter management (eg: Closed urinary catheter system maintenance; urinary catheter patency with free downhill flow; perineal care; monitor intake and output)     Dates: Start: 10/21/20             Intervention: Urinary catheter discontinuation     Dates: Start: 10/21/20       Description: REMINDER(s):  Urinary catheter discontinuation promptly as indicated; remind physician to remove at or before day 5. Problem: Pain     Dates: Start: 10/21/20       Disciplines: Nurse, Interdisciplinary, RT    Goal: *Control of Pain     Dates: Start: 10/21/20   Expected End: 11/03/20       Description: Robbie Fletcher will have pain control AEB a FLACC score of 2 or less.       Disciplines: Nurse, Interdisciplinary, RT    Intervention: Assess pain characteristics (eg: Intensity scale; onset; location; quality; severity; duration; frequency; radiation)     Dates: Start: 10/21/20             Intervention: Assess pain management - barriers (eg: Past pain experiences)     Dates: Start: 10/21/20             Intervention: Identify pain expectations (eg: Patient's pain goal; somatic experiences; behavioral changes; affect)     Dates: Start: 10/21/20             Intervention: Identify pain medication concerns (eg: Cultural considerations; addiction concerns)     Dates: Start: 10/21/20             Intervention: Support system identification (eg: Caregiver; community resource; family; friends; Hindu; support group)     Dates: Start: 10/21/20             Intervention: Monitor for change in patient condition (eg:  Vital signs changes; changes in level of consciousness; nausea; behavioral changes)     Dates: Start: 10/21/20             Intervention: Medication side-effect assessment     Dates: Start: 10/21/20             Intervention: Pain-relief response reassessment (eg: Frequency based on route of administration; effectiveness)     Dates: Start: 10/21/20                         Problem: Patient Education: Go to Patient Education Activity     Dates: Start: 10/21/20       Disciplines: Interdisciplinary    Goal: Patient/Family Education     Dates: Start: 10/21/20       Disciplines: Interdisciplinary                Problem: Patient Education: Go to Patient Education Activity     Dates: Start: 10/21/20       Disciplines: Interdisciplinary    Goal: Patient/Family Education     Dates: Start: 10/21/20       Disciplines: Interdisciplinary                Problem: Pressure Injury - Risk of     Dates: Start: 10/21/20       Disciplines: Interdisciplinary    Goal: *Prevention of pressure injury     Dates: Start: 10/21/20   Expected End: 11/03/20       Description: Document Yeison Scale and appropriate interventions in the flowsheet.     Disciplines: Interdisciplinary                  Care Plan Problems/Goals      Progressing Towards Goal (8)      *Prevention of pressure injury (Pressure Injury - Risk of)    Disciplines:  Interdisciplinary Expected end:  11/03/20        Outcome: Progressing Towards Goal By Walter Fitzgerald on 10/26/20 3310            Patient/Family Education (Patient Education: Go to Patient Education Activity)    Disciplines:  Interdisciplinary Expected end:  -        Outcome: Progressing Towards Goal By Walter Fitzgerald on 10/26/20 7058            *Absence of Falls (Falls - Risk of)    Disciplines:  Interdisciplinary Expected end:  11/03/20        Outcome: Progressing Towards Goal By Walter Fitzgerald on 10/26/20 0333            Patient/Family Education (Patient Education: Go to Patient Education Activity)    Disciplines:  Interdisciplinary Expected end:  -        Outcome: Progressing Towards Goal By Suleman Morris on 10/26/20 3308            *Control of Pain (Pain)    Disciplines:  Nurse, Interdisciplinary, RT Expected end:  11/03/20        Outcome: Progressing Towards Goal By Suleman Arturo on 10/26/20 3511            Verbalize and demonstrate ability to manage anxiety (Anxiety/Agitation)    Disciplines:  Interdisciplinary Expected end:  11/03/20        Outcome: Progressing Towards Goal By Suleman Ready on 10/26/20 1598            *Absence of infection signs and symptoms (Infection - Risk of, Urinary Catheter-Associated Urinary Tract Infection)    Disciplines:  Interdisciplinary Expected end:  11/03/20        Outcome: Progressing Towards Goal By Suleman Ready on 10/26/20 0333            Grief heard and acknowledged, anxiety reduced, patient coping identified, patient/family expressed gratitude (Anticipatory Grief)    Disciplines:  Interdisciplinary Expected end:  11/06/20        Outcome: Progressing Towards Goal By Suleman Arturo on 10/26/20 0333                              ___________________    Care Team Notes          POC/IDG Notes      [facility] IDG Nurse Notes by Yojana Kapadia RN at 10/26/20 1258  Version 1 of 1    Author:  Yojana Kapadia RN Service:  Hospice and Palliative Care Author Type:  Registered Nurse    Filed:  10/26/20 1258 Date of Service:  10/26/20 1258 Status:  Signed    :  Yojana Kapadia RN (Registered Nurse)       Patient: Citlaly Rios    Date: 10/26/20  Time: 12:58 PM    [facility] Nurse Notes  F/U IDG: Pt is an 70-year-old female with Alzheimers who is here GIP level of care from [facility] in home program for management of wounds, pain and agitation. Temp 100.5 ax yesterday but is back to normal range today. Macias with UOP of 150 cc. IV access. PO intake: none.  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 Pt is requiring frequent dressing changes. Pt is on air mattress for comfort. PRN medications: Haldol 2 mg SQ x 3 for agitation, morphine 2 mg SQ x 3 for pain. Scheduled meds:  Fentanyl 12 mcg patch, antifungal with dressing changes. Plan: Dressing changes q 12 hours to debride wound. Add calcium alginate to wound care orders. Comprehensive plan of care reviewed. IDG and pt./family in agreement with plan of care. The IDG identifies through on-going assessment when a change is needed to the POC; the pt/family will receive care and services necessitated by changes in POC. Medications reviewed by the pharmacist and Medical Director. Signed by: Tatianna Tabor RN       [facility] ID Other Notes by Carly Headley at 10/26/20 1148  Version 1 of 1    Author:  Carly Headley Service:  Spiritual Care Author Type:  Pastoral Care    Filed:  10/26/20 1149 Date of Service:  10/26/20 1148 Status:  Signed    :  Carly Headley (Pastoral Care)          MSW continues to be available for support as needed, requested or referred. [address] [facility]  Notes by Carly Headley at 10/26/20 1146  Version 1 of 1    Author:  Carly Headley Service:  Spiritual Care Author Type:  Pastoral Care    Filed:  10/26/20 1148 Date of Service:  10/26/20 1146 Status:  Signed    :  Carly Headley (Pastoral Care)       Patient: Mearri Coles    Date: 10/26/20  Time: 11:46 AM    [facility]  Notes    Intervention: Ministry of presence, prayer, conversation with >    Outcome: Patient appeared to be sleeping soundly. Met . He has difficulty hearing. He was pleasant and appreciative of all the care his wife is receiving. Plan: Continue to provide spiritual and emotional support throughtout patience's time at [facility].          Signed by: Nataliya Ellis       [address] [facility] Nurse Notes by Mary Garsia RN at 10/23/20 4827  Version 1 of 1    Author: Desiree Crum RN Service:  Hospice and Palliative Care Author Type:  Registered Nurse    Filed:  10/23/20 1414 Date of Service:  10/23/20 1413 Status:  Signed    :  Desiree Crum RN (Registered Nurse)       Patient: Pinky Nogueira    Date: 10/23/20  Time: 2:13 PM    [facility] Nurse Notes  1st IDG: Pt is an 79-year-old female with Alzheimers disease who is here GIP level of care from [facility] in home program for management of pain, agitation and wound care. Temp 100.3 ax. Macias with UOP of 25 cc also leaking. PO intake: none. IV access in right wrist.  Wounds: stage III on sacrum. PRN medications: Haldol 2 mg SQ/IV x 7 for agitation, morphine 2 mg IV/SQ x 8 for pain. Scheduled meds:  fentanyl 12 mcg patch added yesterday. Plan: Add antifungal cream/flagyl for wound care with gauze/ABD pad q 48 hours. Comprehensive plan of care reviewed. IDG and pt./family in agreement with plan of care. The IDG identifies through on-going assessment when a change is needed to the POC; the pt/family will receive care and services necessitated by changes in POC. Medications reviewed by the pharmacist and Medical Director. Signed by: Euesbio Evans RN       [address] [place] [address]  Notes by Reva Zhang at 10/23/20 1236  Version 1 of 1    Author:  Reva Zhang Service:  Spiritual Care Author Type:  Pastoral Care    Filed:  10/23/20 1242 Date of Service:  10/23/20 1236 Status:  Signed    :  Reva Zhang (Pastoral Care)       Patient: Pinky Nogueira    Date: 10/23/20  Time: 12:36 PM    [facility]  Notes    Intervention: Ministry of presence. Prayer and completion of assessments. Outcome; Patient resting with eyes closed. Plan: Continue to provide spiritual and emotional support.          Signed by: [place] [address] ID  Notes by Zully Leal LMSW at 10/23/20 1049  Version 1 of 1    Author:  Zully Leal LMSW Service:  Licensed Clinical  Author Type:  Social Worker    Filed:  10/23/20 1050 Date of Service:  10/23/20 1049 Status:  Signed    :  Amari Neighbor, LMSW ()       [facility] to assess coping and needs each visit and offer availability. [address] [facility]  Notes by Debora García at 10/22/20 0946  Version 1 of 1    Author:  Debora García Service:  Spiritual Care Author Type:  Pastoral Care    Filed:  10/22/20 0947 Date of Service:  10/22/20 0946 Status:  Signed    :  Debora García (Pastoral Care)       Patient: Ted Hoffman    Date: 10/22/20  Time: 9:46 AM    [facility]  Notes    / Grief Counselor has reviewed  Initial Comprehensive Assessment and plan of care. Bereavement and Spiritual Care Assessments to be completed and plan of care put in place to meet the needs, requests and referrals. Signed by: José Antonio Najera       [address] ID Nurse Notes by Samantha Miller RN at 10/21/20 2140  Version 1 of 1    Author:  Samantha Miller RN Service:  -- Author Type:  Registered Nurse    Filed:  10/21/20 2222 Date of Service:  10/21/20 2140 Status:  Signed    :  Samantha Miller RN (Registered Nurse)       Patient: Ted Hoffman    Date: 10/21/20  Time: 9:40 PM    [address] Nurse Notes    Patient is an 55-year-old female admitted from the in-home program for [facility] level of care with a hospice diagnosis of Alzheimer's for management of pain, agitation, and wound care. The patient has had an acceleration of her Alzheimer's debility related to her right gluteus medius, iliopsoas, and hamstring tendon injury. The patient has had more fecal and urinary incontinence and more disorientation. The patient's Stage III wound has also made care for her at home more complex. The patient has come to [facility] for wound care and to better manage the patient's pain and agitation with injectable medications. On admission, patient was noted to be grimacing and moaning. FLACC 5/10.   Patient was medicated with PRN Morphine 2 mg SQ for pain and PRN Haldol 2 mg SQ for agitation. On admission, the patient's miles catheter was noted to be leaking. New miles catheter was placed via sterile technique with CNA assist.  Allevyn to sacrum was removed and a wet to moist dressing was applied to the sacrum per orders. Patient tolerated the procedure fair. Patient has no IV access. Patient was alert but was nonverbal with this nurse. Bilateral hands were noted to be contracted. Patient on air mattress for pressure relief. No family was here at time of admission. Medications found in patient suitcase and locked up per facility policy. Admission complete and hospice care plan initiated which includes spiritual, psychosocial, and bereavement. No initial needs identified. IDG team, including MD, made aware of care plans. Volunteer services suspended due to COVID-19.       Signed by: Johnny Arthur RN                Care Team Present:   Team Members Present: (see sign in sheet for attendees)

## 2020-10-26 NOTE — [facility] IDG SOCIAL WORKER NOTES
Pt/family are receiving emotional support AEB pt/family verbalize emotional needs are addressed each visit.

## 2020-10-26 NOTE — PROGRESS NOTES
6480: Walking rounds with off going RN. Pt was admitted 10/21/2020 from [facility] in home program for [facility] level care with a diagnosis of Alzheimer's disease. Pt is needing management for pain, agitation and wound care. She received  1 dose of Morphine during the night to promote comfort. Presently pt is on an air mattress to relieve pressure, resting with eyes closed, resps even and regular. No s/sx of pain or discomfort observed. FLACC score 0/10. Fentanyl 12mcg/hr patch observed to left upper chest.    0751: Pt medicated with Morphine 4mg SC and Haldol 2mg SC to promote comfort during her bath and wound care. Pt crying and moaning when blankets moved. FLACC score 8/10.     0830: Pt resting with eyes closed after bath, drsg dry and intact. Macias catheter patent and draining clear yellow. FLACC score now 0/10.     1030: No family present in room. Pt resting with eyes closed, resps shallow. No s/sx of pain or other discomfort observed. FLACC score 0/10.     1230: Pt without any changes in condition. FLACC score remains 0/10.     1430: Family present in the room. No voiced concerns at this time. Pt remains without change in condition. FLACC score 0/10.     1605: pt medicated with Morphine 4mg SC and Haldol 2mg SC to decrease agitation and pain prior to dressing change. Drsg to sacrum changed and packed with calcium alginate. Pt tolerating poorly. Pad underneath patient soaked with urine. Macias catheter removed and and new catheter attempted but unsuccessful. NP aware and student NP attempted as well. without success. Pt seems to have a fistula like area. 1638: Pt medicated with Morphine 4mg SC after dressing change and attempts at placing Macias catheter to promote comfort as pain during was FLACC scored at 10/10.     1705: Pt now resting with eyes closed, resps shallow but regular. No s/sx of pain or other discomfort observed. FLACC score now 0/10.     1840: Walking rounds with oncoming RN.

## 2020-10-26 NOTE — [facility] IDG CHAPLAIN NOTES
Patient: Heatehr Bey    Date: 10/26/20  Time: 11:46 AM    [facility]  Notes    Intervention: Ministry of presence, prayer, conversation with >    Outcome: Patient appeared to be sleeping soundly. Met . He has difficulty hearing. He was pleasant and appreciative of all the care his wife is receiving. Plan: Continue to provide spiritual and emotional support throughtout patience's time at [facility].          Signed by: Kim Henry

## 2020-10-26 NOTE — PROGRESS NOTES
8460 Walking rounds completed with Kimberly Gutierrez RN. Pt identified by name and . Pt is under GIP care with a hospice diagnosis of Alzheimer's dementia without behavioral disrurbance. Pt being managed for pain, agitation, wound care. Pt in bed with eyes closed; no signs of pain or distress observed. RR non labored on room air. No s/sx anxiety, agitation, NVD, or SOB. Fent 12mcg to Left chest  and verified. Dressing to buttocks dry and intact.  Bed in lowest, locked position with siderails x2; call light within reach and tab alert in place. Pt room near nurses station for safety and observation. FLACC 0/10.    2056 Pt resting in bed with eyes closed; no signs of pain or distress noted. RR shallow and non labored. No s/sx NVD or SOB. FLACC 0/10.     2232 Pt resting in bed with eyes closed; no signs of pain or distress noted. RR non labored. No s/sx NVD or SOB. FLACC 0/10.    0044 Pt resting in bed with eyes closed; no signs of pain or distress noted. RR non labored. No s/sx NVD or SOB. FLACC 010.    0229 Pt resting in bed with eyes closed; no signs of pain or distress noted. RR shallow and non labored. No s/sx NVD or SOB. FLACC 010.    0541 Pt medicated prior to brief change. Some moaning and facial grimacing noted; PRN Morphine given for pain. Dressing to buttocks saturated; changed per orders. Pt tolerated with little complaint. No s/sx NVD or SOB. FLACC 0/10. Walking rounds completed with Evens Snow RN.

## 2020-10-26 NOTE — PROGRESS NOTES
Progress Note    Patient: Willis Persaud MRN: [id number]  SSN: [id number]    YOB: 1932  Age: 80 y.o. Sex: female      Admit Date: 10/21/2020    LOS: 5 days     Subjective:     Agitated with stimuli. NPO. Has required Morphine 2mg x 3 SQ for pain, Morphine 4mg x 2 SQ with wound care and haldol x 3 for agitation. Review of Systems:  Review of systems not obtained due to patient factors. Objective:     Vitals:    10/25/20 0526 10/25/20 0633 10/25/20 1756 10/26/20 0537   BP: (!) 182/77  (!) 171/74 (!) 203/92   Pulse: 86  80 89   Resp: 20 20 18   Temp: (!) 100.5 °F (38.1 °C) 99.7 °F (37.6 °C) 99.3 °F (37.4 °C) 99.3 °F (37.4 °C)        Intake and Output:  Current Shift: No intake/output data recorded. Last three shifts: 10/24 1901 - 10/26 0700  In: -   Out: 150 [Urine:150]    Physical Exam:   GENERAL: fatigued, cooperative, moderate distress with stimuli, appears older than stated age, pale, cachectic  LUNG: Clear diminished breath sounds with shallow and unlabored respirations. HEART: regular rate and rhythm  ABDOMEN: soft, non-tender. Bowel sounds hypoactive. : Macias catheter with cloudy ya urine. EXTREMITIES:  extremities with no cyanosis or edema. + pulses bilaterally. Upper extremities contracted. SKIN: Pale. Warm to touch. 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 NEUROLOGIC: Agitated with stimuli, screams out when touched. Profound weakness. Bedbound. PSYCHIATRIC: agitated    Lab/Data Review:  No new labs resulted in the last 24 hours.     Assessment:     Principal Problem:    Alzheimer's dementia without behavioral disturbance ([address] (10/21/2020)    Active Problems:    Anorexia (8/27/2020)      Unintentional weight loss (8/27/2020)      Debility (8/27/2020)      Dehydration (8/27/2020)      Hypertension (8/27/2020)      Hospice care (9/9/2020)      Ataxia (10/23/2020)      History of hip fracture (10/23/2020)      Falls, sequela (10/23/2020)      Muscle injury (10/23/2020)        Plan:     Current Facility-Administered Medications   Medication Dose Route Frequency    morphine injection 4 mg  4 mg SubCUTAneous Q20MIN PRN    metroNIDAZOLE (FLAGYL) tablet 500 mg  500 mg Topical Q12H    And    miconazole (MICOTIN) 2 % cream   Topical Q12H    fentaNYL (DURAGESIC) 12 mcg/hr patch 1 Patch  1 Patch TransDERmal Q72H    haloperidol lactate (HALDOL) injection 2 mg  2 mg SubCUTAneous Q1H PRN    acetaminophen (TYLENOL) suppository 650 mg  650 mg Rectal Q3H PRN    bisacodyL (DULCOLAX) suppository 10 mg  10 mg Rectal PRN    haloperidol lactate (HALDOL) injection 2 mg  2 mg SubCUTAneous Q1H PRN    glycopyrrolate (ROBINUL) injection 0.2 mg  0.2 mg SubCUTAneous Q4H PRN    morphine injection 2 mg  2 mg SubCUTAneous Q20MIN PRN       10/21: ([facility] pt-Osiris RN) Admitted GIP with Alzheimer's dementia without behavioral disturbance for management of pain, agitation and wound care.      1. Pain: Morphine 2mg IV/SQ Q20 minutes as needed. Fentanyl 12mcg patch in place.      2. Agitation: Haloperidol 2mg IV/SQ Q1 hour prn.     3. Wound Care: Turn and reposition Q2 hours and prn. Wound care as ordered for sacral wound. Air mattress in place for pressure reduction.     4. Family/Pt Support: No family at bedside during exam. Medications and plan of care discussed with nursing staff. Will continue to monitor for symptoms and adjust medications as needed to maintain patient comfort. PPS 10%. Case discussed with Dr. Michaelle Gallardo and [facility]. Add calcium alginate to wound care orders for stage 4 wound.      Signed By: Alba Vega NP     October 26, 2020

## 2020-10-26 NOTE — [facility] IDG NURSE NOTES
Patient: Ted Hoffman    Date: 10/26/20  Time: 12:58 PM    [facility] Nurse Notes  F/U IDG: Pt is an 80-year-old female with Alzheimers who is here GIP level of care from [facility] in home program for management of wounds, pain and agitation. Temp 100.5 ax yesterday but is back to normal range today. Macias with UOP of 150 cc. IV access. PO intake: none. 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 Pt is requiring frequent dressing changes. Pt is on air mattress for comfort. PRN medications: Haldol 2 mg SQ x 3 for agitation, morphine 2 mg SQ x 3 for pain. Scheduled meds:  Fentanyl 12 mcg patch, antifungal with dressing changes. Plan: Dressing changes q 12 hours to debride wound. Add calcium alginate to wound care orders. Comprehensive plan of care reviewed. IDG and pt./family in agreement with plan of care. The IDG identifies through on-going assessment when a change is needed to the POC; the pt/family will receive care and services necessitated by changes in POC. Medications reviewed by the pharmacist and Medical Director.         Signed by: Anai Moore RN

## 2020-10-27 NOTE — PROGRESS NOTES
Problem: Pressure Injury - Risk of  Goal: *Prevention of pressure injury  Description: Document Yeison Scale and appropriate interventions in the flowsheet. Outcome: Progressing Towards Goal  Note: Pressure Injury Interventions:  Sensory Interventions: Assess changes in LOC, Keep linens dry and wrinkle-free, Minimize linen layers, Pressure redistribution bed/mattress (bed type)    Moisture Interventions: Absorbent underpads, Maintain skin hydration (lotion/cream), Minimize layers    Activity Interventions: Assess need for specialty bed, Pressure redistribution bed/mattress(bed type)    Mobility Interventions: Assess need for specialty bed, HOB 30 degrees or less, Float heels, Pressure redistribution bed/mattress (bed type)    Nutrition Interventions: Document food/fluid/supplement intake    Friction and Shear Interventions: Apply protective barrier, creams and emollients, HOB 30 degrees or less, Minimize layers                Problem: Falls - Risk of  Goal: *Absence of Falls  Description: Document Soraya Fall Risk and appropriate interventions in the flowsheet. Outcome: Progressing Towards Goal  Note: Fall Risk Interventions:       Mentation Interventions: Adequate sleep, hydration, pain control, Bed/chair exit alarm, Door open when patient unattended    Medication Interventions: Bed/chair exit alarm    Elimination Interventions: Bed/chair exit alarm, Call light in reach              Problem: Pain  Goal: *Control of Pain  Description: Lamine Robins will have pain control AEB a FLACC score of 2 or less. Outcome: Progressing Towards Goal     Problem: Anxiety/Agitation  Goal: Verbalize and demonstrate ability to manage anxiety  Description: The patient/family/caregiver will verbalize and demonstrate ability to manage the patient's anxiety throughout hospice care.   Outcome: Progressing Towards Goal

## 2020-10-27 NOTE — PROGRESS NOTES
1845:  Patient report from Elizabeth Jain RN. Patient ID by name and . Patient resting in bed with eyes closed. No s/sx of pain, dyspnea, or agitation observed. FLACC 0/10. Bed low and locked and all safety measures in place. Door open. 2100:  Physical assessment complete. Dressing due to be changed at this time. Dressing changed PRN from first shift nurse at [phone] [id number]. Dressing is now c/d/i. Will hold off on dressing change at this time and will continue to monitor need. Patient brief dry. Repositioned for comfort with CNA assist.      2300:  Patient resting with no s/sx of pain, dyspnea, or agitation observed. FLACC 0/10.    0100:  Patient resting with no s/sx of pain, dyspnea, or agitation observed. FLACC 0/10.    1291:  Dressing to sacrum checked at this time. Dressing continues to be c/d/i. Patient brief changed and patient repositioned for comfort with CNA assist.  Patient moaning out with care. FLACC 6/10. Medicated with PRN Morphine 2 mg SQ for pain and PRN Haldol 2 mg SQ for agitation. Will reassess. 2663:  Reassessment:  Patient now resting with no s/sx of pain or agitation observed. FLACC 0/10.    4753:  Patient resting with no s/sx of pain, dyspnea, or agitation observed. FLACC 0/10. Patient required Morphine x 1 and Haldol x 1 this shift for symptom management. Report to Elizabeth Jain RN.

## 2020-10-27 NOTE — PROGRESS NOTES
Progress Note    Patient: Joi November MRN: [id number]  SSN: [id number]    YOB: 1932  Age: 80 y.o. Sex: female      Admit Date: 10/21/2020    LOS: 6 days     Subjective:     Agitated with stimuli. NPO. Has required Morphine 2mg x 1 SQ for pain, Morphine 4mg x 2 SQ with wound care and haldol x 3 for agitation. Review of Systems:  Review of systems not obtained due to patient factors. Objective:     Vitals:    10/25/20 1756 10/26/20 0537 10/26/20 1701 10/27/20 0537   BP: (!) 171/74 (!) 203/92 (!) 145/65 (!) 181/75   Pulse: 80 89 71 94   Resp: 20 18 18 20   Temp: 99.3 °F (37.4 °C) 99.3 °F (37.4 °C) 99.5 °F (37.5 °C) 99.9 °F (37.7 °C)        Intake and Output:  Current Shift: No intake/output data recorded. Last three shifts: 10/25 1901 - 10/27 0700  In: -   Out: 200 [Urine:200]    Physical Exam:   GENERAL: fatigued, cooperative, moderate distress with stimuli, appears older than stated age, pale, cachectic  LUNG: Clear diminished breath sounds with shallow and unlabored respirations. HEART: regular rate and rhythm  ABDOMEN: soft, non-tender. Bowel sounds hypoactive. : Macias catheter with cloudy ya urine. EXTREMITIES:  extremities with no cyanosis or edema. + pulses bilaterally. Upper extremities contracted. SKIN: Pale. Warm to touch. 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 NEUROLOGIC: Agitated with stimuli, screams out when touched. Profound weakness. Bedbound. PSYCHIATRIC: agitated    Lab/Data Review:  No new labs resulted in the last 24 hours.     Assessment:     Principal Problem:    Alzheimer's dementia without behavioral disturbance ([address] (10/21/2020)    Active Problems:    Anorexia (8/27/2020)      Unintentional weight loss (8/27/2020)      Debility (8/27/2020)      Dehydration (8/27/2020)      Hypertension (8/27/2020)      Hospice care (9/9/2020)      Ataxia (10/23/2020)      History of hip fracture (10/23/2020)      Falls, sequela (10/23/2020)      Muscle injury (10/23/2020)        Plan:     Current Facility-Administered Medications   Medication Dose Route Frequency    morphine injection 4 mg  4 mg SubCUTAneous Q20MIN PRN    metroNIDAZOLE (FLAGYL) tablet 500 mg  500 mg Topical Q12H    And    miconazole (MICOTIN) 2 % cream   Topical Q12H    fentaNYL (DURAGESIC) 12 mcg/hr patch 1 Patch  1 Patch TransDERmal Q72H    haloperidol lactate (HALDOL) injection 2 mg  2 mg SubCUTAneous Q1H PRN    acetaminophen (TYLENOL) suppository 650 mg  650 mg Rectal Q3H PRN    bisacodyL (DULCOLAX) suppository 10 mg  10 mg Rectal PRN    haloperidol lactate (HALDOL) injection 2 mg  2 mg SubCUTAneous Q1H PRN    glycopyrrolate (ROBINUL) injection 0.2 mg  0.2 mg SubCUTAneous Q4H PRN    morphine injection 2 mg  2 mg SubCUTAneous Q20MIN PRN       10/21: ([facility] pt-Osiris RN) Admitted GIP with Alzheimer's dementia without behavioral disturbance for management of pain, agitation and wound care.      1. Pain: Morphine 2mg IV/SQ Q20 minutes as needed. Fentanyl 12mcg patch in place.      2. Agitation: Haloperidol 2mg IV/SQ Q1 hour prn.     3. Wound Care: Turn and reposition Q2 hours and prn. Wound care as ordered for sacral wound. Air mattress in place for pressure reduction. Morphine 4mg IV/SQ prn dressing change.     4. Family/Pt Support: No family at bedside during exam. Medications and plan of care discussed with nursing staff. Will continue to monitor for symptoms and adjust medications as needed to maintain patient comfort. PPS 10%. Case discussed with Dr. Lex Pepe. Change wound care to once daily. Add prn orders for flagyl and antifungal cream for wound care for prn changes.       Signed By: Debbie Mariano NP     October 27, 2020

## 2020-10-27 NOTE — PROGRESS NOTES
Follow up:    Mrs. Lakshmi Douglas appears to be resting peacefully. Her eyes are closed. No signs of distress or pain. She is very quiet.  offered a prayer at bedside and provided scripture.  left a flier on the table. For I am convinced that neither death nor life, neither angels nor demons,   neither the present nor the future, nor any fields, neither height nor depth,   nor anything else in all creation, will be able to separate us   from the love of God that is in [address] [facility]. [facility].   Theresa 8:38-39

## 2020-10-27 NOTE — PROGRESS NOTES
6032: Walking rounds with off going RN. Pt was admitted 10/21/2020 from [facility] in home program for [facility] level care with a diagnosis of Alzheimer's disease. Pt is needing management for pain, agitation and wound care. She received  1 dose of Morphine and 1 dose of Haldol during the night to promote comfort. Presently pt is on an air mattress to relieve pressure, resting with eyes closed, resps even and regular. No s/sx of pain or discomfort observed. FLACC score 0/10. Fentanyl 12mcg/hr patch observed to left upper chest.    0840: Pt resting with eyes closed, non-responsive except for painful stimuli. Resps are shallow but regular. No s/sx of pain, agitation or any other discomfort. FLACC score 0/10. Drsg to sacrum dry and intact. No family in the room. 1040: Spouse arrived to room. Requested and received coffee as he had not eaten breakfast. Pt resting with eyes closed, resps shallow FLACC score 01/0.     1300: Spouse remains in room visiting. Pt remains in bed without change. FLACC score 0/10.     1514: Morphine 4mg and Haldol 2mg SC administered prior to dressing change. Pt turned and repositioned after drsg change. Smearing small amount of soft brown stool and inc of moderate amount of urine. Once patient was resettled and positioned she relaxed and closed her eyes. Pt did not tolerate the procedure well. 1540: Pt resting with eyes closed, resps even and regular. No s/sx of pain or other discomfort. FLACC score 0/10.    1705: Pt resting without change in condition. Remains unresponsive except to tactile stimuli or pain.  FLACC score presently is 0/10.       1840: Report given to on coming RN

## 2020-10-27 NOTE — PROGRESS NOTES
1845:  Report received from Sherita Araujo RN. Patient ID by name and . Patient in bed resting with eyes closed. No s/sx of pain, dyspnea, or agitation observed. FLACC 0/10. Bed low and locked and all safety measures in place. Door open. 191:  Patient premedicated for bath with PRN Morphine 2 mg SQ and PRN Haldol 2 mg SQ. Physical assessment complete. 2100:  Patient resting with no s/sx of pain, dyspnea, or agitation observed. FLACC 0/10.      2300:  Patient resting with no s/sx of pain, dyspnea, or agitation observed. FLACC 0/10.     0130:  Pt moaning and whimpering. FLACC 4/10. Medicated with PRN Morphine 2 mg SQ for pain and PRN Haldol 2 mg SQ for agitation. Will reassess. 0215:  Reassessment:  Pt resting with no s/sx of pain or agitation observed. FLACC 0/10.    0450:  Medicated with PRN Morphine 2 mg SQ and PRN Haldol 2 mg SQ post repositioning and brief change. Dressing to sacrum c/d/i.      0530:  Reassessment:  Pt now resting with no s/sx of pain. FLACC 0/10. Patient has required Morphine x 3 and Haldol x 3 this shift. Report to Eusebio Evans RN.

## 2020-10-28 NOTE — PROGRESS NOTES
8821 - Report received from Camille Franklin RN.  Patient identified.  Patient GIP level of care with hospice diagnosis of alzheimer's dementia w/o behavioral disturbance.  Patient in bed, eyes closed.  No s/sx anxiety, agitation, NVD or respiratory distress.  FLACC 0/10.  Fent 12mcg to L chest verified.  Bed in lowest, locked position, call light within reach, tab alert on, and SR up for safety.     0856 - Assessment complete (see flowsheets). PRN morphine 2mg subq and PRN haldol 2mg subq given to pre-medicate for turning. Patient moaned/groaned with turning but settled once done. No other s/sx anxiety, agitation, NVD, respiratory distress or pain. 1105 - Patient in bed, eyes closed.  No s/sx anxiety, agitation, NVD or respiratory distress.  FLACC 0/10.     1245 - Patient in bed, eyes closed.  No s/sx anxiety, agitation, NVD or respiratory distress.  FLACC 0/10.  at bedside. 1503 - PRN morphine 4mg subq given to pre-medicate for wound care. Wound care complete per orders. New wound noted to back of patients neck. Leaking fluid. Allevyn placed. Katie Rider NP notified. Patient in pain and agitated during wound care. No s/sx/ NVD or respiratory distress. 1521 - PRN morphine 4mg subq given for post wound care. Sierra applied to patient's L arm per orders. Fent 12 removed and wasted with [facility] RN. Patient in bed, eyes closed. No s/sx anxiety, agitation, NVD or respiratory distress. FLACC 0/10.     Report given to Last Berg RN

## 2020-10-28 NOTE — PROGRESS NOTES
Problem: Pain  Goal: *Control of Pain  Description: Irwin Martinez will have pain control AEB a FLACC score of 2 or less.     Outcome: Progressing Towards Goal

## 2020-10-28 NOTE — PROGRESS NOTES
Progress Note    Patient: Aayush Fernandez MRN: [id number]  SSN: [id number]    YOB: 1932  Age: 80 y.o. Sex: female      Admit Date: 10/21/2020    LOS: 7 days     Subjective:     Agitated with stimuli. NPO. Has required Morphine 2mg x 5 SQ for pain, Morphine 4mg x 2 SQ with wound care and haldol x 5 for agitation. Review of Systems:  Review of systems not obtained due to patient factors. Objective:     Vitals:    10/26/20 1701 10/27/20 0537 10/27/20 1637 10/28/20 0508   BP: (!) 145/65 (!) 181/75 (!) 137/94 136/77   Pulse: 71 94 78 71   Resp: 18 20 20 22   Temp: 99.5 °F (37.5 °C) 99.9 °F (37.7 °C) 99.2 °F (37.3 °C) 98 °F (36.7 °C)        Intake and Output:  Current Shift: No intake/output data recorded. Last three shifts: No intake/output data recorded. Physical Exam:   GENERAL: moderate distress with stimuli, appears older than stated age, pale, cachectic  LUNG: Clear diminished breath sounds. Shallow and unlabored respirations. HEART: regular rate and rhythm  ABDOMEN: soft, non-tender. Bowel sounds hypoactive. : Incontinent. EXTREMITIES:  extremities with no cyanosis or edema. + pulses bilaterally. Upper extremities contracted. SKIN: Pale. Warm to touch. 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 NEUROLOGIC: Agitated with stimuli, screams out when touched. Profound weakness. Bedbound. PSYCHIATRIC: agitated    Lab/Data Review:  No new labs resulted in the last 24 hours.     Assessment:     Principal Problem:    Alzheimer's dementia without behavioral disturbance ([facility] [address] (10/21/2020)    Active Problems:    Anorexia (8/27/2020)      Unintentional weight loss (8/27/2020)      Debility (8/27/2020)      Dehydration (8/27/2020)      Hypertension (8/27/2020)      Hospice care (9/9/2020)      Ataxia (10/23/2020)      History of hip fracture (10/23/2020)      Falls, sequela (10/23/2020)      Muscle injury (10/23/2020)        Plan:     Current Facility-Administered Medications Medication Dose Route Frequency    fentaNYL (DURAGESIC) 25 mcg/hr patch 1 Patch  1 Patch TransDERmal Q72H    metroNIDAZOLE (FLAGYL) tablet 500 mg  500 mg Topical DAILY    And    miconazole (MICOTIN) 2 % cream   Topical DAILY    metroNIDAZOLE (FLAGYL) tablet 500 mg  500 mg Topical PRN    And    miconazole (MICOTIN) 2 % cream   Topical PRN    morphine injection 4 mg  4 mg SubCUTAneous Q20MIN PRN    haloperidol lactate (HALDOL) injection 2 mg  2 mg SubCUTAneous Q1H PRN    acetaminophen (TYLENOL) suppository 650 mg  650 mg Rectal Q3H PRN    bisacodyL (DULCOLAX) suppository 10 mg  10 mg Rectal PRN    haloperidol lactate (HALDOL) injection 2 mg  2 mg SubCUTAneous Q1H PRN    glycopyrrolate (ROBINUL) injection 0.2 mg  0.2 mg SubCUTAneous Q4H PRN    morphine injection 2 mg  2 mg SubCUTAneous Q20MIN PRN       10/21: ([facility] pt-Osiris RN) Admitted GIP with Alzheimer's dementia without behavioral disturbance for management of pain, agitation and wound care.      1. Pain: Morphine 2mg IV/SQ Q20 minutes as needed. Fentanyl 12mcg patch in place.      2. Agitation: Haloperidol 2mg IV/SQ Q1 hour prn.     3. Wound Care: Turn and reposition Q2 hours and prn. Wound care as ordered for sacral wound. Air mattress in place for pressure reduction. Morphine 4mg IV/SQ prn dressing change.     4. Family/Pt Support: No family at bedside during exam. Medications and plan of care discussed with nursing staff. Will continue to monitor for symptoms and adjust medications as needed to maintain patient comfort. PPS 10%. Case discussed with Dr. Gene Solorzano. Increase fentanyl to 25mcg.       Signed By: Antonietta Ferrell NP     October 28, 2020

## 2020-10-28 NOTE — PROGRESS NOTES
Problem: Pressure Injury - Risk of  Goal: *Prevention of pressure injury  Description: Document Yeison Scale and appropriate interventions in the flowsheet. Outcome: Progressing Towards Goal  Note: Pressure Injury Interventions:  Sensory Interventions: Assess changes in LOC, Keep linens dry and wrinkle-free, Minimize linen layers, Pressure redistribution bed/mattress (bed type)    Moisture Interventions: Absorbent underpads, Maintain skin hydration (lotion/cream), Minimize layers    Activity Interventions: Assess need for specialty bed, Pressure redistribution bed/mattress(bed type)    Mobility Interventions: Assess need for specialty bed, HOB 30 degrees or less, Float heels, Pressure redistribution bed/mattress (bed type)    Nutrition Interventions: Document food/fluid/supplement intake    Friction and Shear Interventions: Apply protective barrier, creams and emollients, HOB 30 degrees or less, Minimize layers                Problem: Falls - Risk of  Goal: *Absence of Falls  Description: Document Soraya Fall Risk and appropriate interventions in the flowsheet. Outcome: Progressing Towards Goal  Note: Fall Risk Interventions:       Mentation Interventions: Adequate sleep, hydration, pain control, Bed/chair exit alarm, Door open when patient unattended    Medication Interventions: Bed/chair exit alarm    Elimination Interventions: Bed/chair exit alarm, Call light in reach              Problem: Pain  Goal: *Control of Pain  Description: Isa Kearns will have pain control AEB a FLACC score of 2 or less. Outcome: Progressing Towards Goal     Problem: Anxiety/Agitation  Goal: Verbalize and demonstrate ability to manage anxiety  Description: The patient/family/caregiver will verbalize and demonstrate ability to manage the patient's anxiety throughout hospice care.   Outcome: Progressing Towards Goal

## 2020-10-29 NOTE — PROGRESS NOTES
Follow up:    Ms. Yvonne Rodriguez appears to be resting quietly. No sign of distress. Her eyes are closed.  softly offered prayer. No family present.

## 2020-10-29 NOTE — [facility] IDG MASTER NOTE
[facility]  Date: 10/29/20  Time: 1:35 PM    ___________________    Patient: Phi Colin  Coverage Information:     Payor: [place]     Plan: [place] MED[place]ARE PART A AND B     Subscriber ID: [id number]     Phone Number:   MRN: [id number]      Current Benefit Period: Benefit Period 1  Start Date: 8/31/2020  End Date: 11/28/2020        [facility] [address]  [address]  Phone: [phone]  Fax: [phone]    Level of Care: General Inpatient Care      ___________________    Diagnoses: There were no encounter diagnoses.     Current Medications:    Current Facility-Administered Medications:     fentaNYL (DURAGESIC) 25 mcg/hr patch 1 Patch, 1 Patch, TransDERmal, Q72H, Ju Hayes NP, 1 Patch at 10/28/20 1659    metroNIDAZOLE (FLAGYL) tablet 500 mg, 500 mg, Topical, DAILY, 500 mg at 10/28/20 1503 **AND** miconazole (MICOTIN) 2 % cream, , Topical, DAILY, Reshma Butts NP    metroNIDAZOLE (FLAGYL) tablet 500 mg, 500 mg, Topical, PRN **AND** miconazole (MICOTIN) 2 % cream, , Topical, PRN, Ju Hayes NP    morphine injection 4 mg, 4 mg, SubCUTAneous, Q20MIN PRN, Lima Orosco MD, 4 mg at 10/28/20 2007    haloperidol lactate (HALDOL) injection 2 mg, 2 mg, SubCUTAneous, Q1H PRN **OR** [DISCONTINUED] haloperidol lactate (HALDOL) injection 2 mg, 2 mg, IntraVENous, Q1H PRN, Ju Hayes NP, 2 mg at 10/23/20 2049    acetaminophen (TYLENOL) suppository 650 mg, 650 mg, Rectal, Q3H PRN, Ju Hayes NP    bisacodyL (DULCOLAX) suppository 10 mg, 10 mg, Rectal, PRN, Ju Hayes NP    haloperidol lactate (HALDOL) injection 2 mg, 2 mg, SubCUTAneous, Q1H PRN, 2 mg at 10/28/20 2007 **OR** [DISCONTINUED] haloperidol lactate (HALDOL) injection 2 mg, 2 mg, IntraVENous, Q1H PRN, Ju Hayes NP, 2 mg at 10/25/20 1720    glycopyrrolate (ROBINUL) injection 0.2 mg, 0.2 mg, SubCUTAneous, Q4H PRN, Benjamín Sahara Luke NP    morphine injection 2 mg, 2 mg, SubCUTAneous, Q20MIN PRN, 2 mg at 10/29/20 0536 **OR** [DISCONTINUED] morphine injection 2 mg, 2 mg, IntraVENous, Q20MIN PRN, Hansel Reed NP, 2 mg at 10/25/20 1720    Orders:  Orders Placed This Encounter    IP CONSULT TO SPIRITUAL CARE Once on week one, then PRN. For [facility] patients only. For contracted patients, primary hospice will continue to manage spiritual care needs     Once on week one, then PRN. For [facility] patients only. For contracted patients, primary hospice will continue to manage spiritual care needs     Standing Status:   Standing     Number of Occurrences:   1     Order Specific Question:   Reason for Consult: Answer:   Spiritual crisis intervention or per patient or caregiver request    DIET PLEASURE     Standing Status:   Standing     Number of Occurrences:   1     Order Specific Question:   Likes/Dislikes/Preferences     Answer:   HOLD TRAY, thickened liquids    VITAL SIGNS     Standing Status:   Standing     Number of Occurrences:   1    NURSING-MISCELLANEOUS: Comfort Care Measures: Enter comfort measures above. CONTINUOUS     Enter comfort measures above. Standing Status:   Standing     Number of Occurrences:   1     Order Specific Question:   Description of Order:     Answer:   Comfort Care Measures:    JONES CATHETER, CARE     1. Jones Catheter care every shift and PRN  2. Notify Physician of Jones Catheter leakage, occlusion, gross adherent sediment or accidental removal  3. Change Jones 30 days after insertion. 4. May flush catheter prn leakage or gross adherent sediment or mucus. Standing Status:   Standing     Number of Occurrences:   1    BLADDER CHECKS     May scan bladder PRN for urinary retention and or patient discomfort     Standing Status:   Standing     Number of Occurrences:   1    NURSING ASSESSMENT:  SPECIFY Assess for GIP, routine, or respite level of care.  Q SHIFT Routine Standing Status:   Standing     Number of Occurrences:   1     Order Specific Question:   Please describe the test or procedure you would like to order. Answer:   Assess for GIP, routine, or respite level of care.  PAIN ASSESSMENT Pain and Symptoms: Assess ever 4 hours and PRN, for GIP level of care. PRN Routine     Standing Status:   Standing     Number of Occurrences:   1     Order Specific Question:   Please describe the test or procedure you would like to order. Answer:   Pain and Symptoms: Assess ever 4 hours and PRN, for GIP level of care.  BEDREST, COMPLETE     Standing Status:   Standing     Number of Occurrences:   1    NURSING-MISCELLANEOUS: DME: Please order and place air mattress for pressure reduction. CONTINUOUS     Please order and place air mattress for pressure reduction. Standing Status:   Standing     Number of Occurrences:   1     Order Specific Question:   Description of Order:     Answer:   DME:    NURSING-MISCELLANEOUS: admit 10/21: [facility]) Admitted GIP with Alzheimer's dementia without behavioral disturbance for management of pain, agitation and wound care. Other Hospice diagnoses: Ataxia (R27.0) History of hip fracture (E90. ...     10/21: [facility]) Admitted GIP with Alzheimer's dementia without behavioral disturbance for management of pain, agitation and wound care.    Other Hospice diagnoses:     Ataxia (R27.0)     History of hip fracture (Z87.81)     Fall, sequela (W19.XXXS)     Muscle injury (T14.90XA)     Debility (R53.81)     Hypertension, essential (I10)     Benefit Period 1  Start Date: 8/31/2020  End Date: 84/90/9200     I certify Anita Him has a prognosis for a life expectancy of 6 months or less if the terminal illness runs its normal course.     As evidenced by an 51-year-old woman with precipitous acceleration of debility in Alzheimer's disease directly related to right gluteus medius, iliopsoas and hamstring tendon injury with hemorrhage which appears to be subacute. E. coli urinary tract infection with sepsis requiring hospitalization 8/24/2020 is another related diagnoses contributing to adverse prognosis for this octogenarian. She has declined to requiring assist in transfers and ambulation, intermittent fecal and urinary incontinence, disorientation in place, time and intention. This is F AST 6 level functional compromise. In the setting of other life-threatening musculoskeletal and infectious complications this woman's life expectancy with care limited to comfort measures is less than 6 months. Her  has chosen to seek to avoid recurrent hospitalizations and to limit further care to comfort measures provided at home through the auspices of [facility]. Blood loss anemia, hypertension, and anorexia are further related problems contributing to poor prognosis        Standing Status:   Standing     Number of Occurrences:   1     Order Specific Question:   Description of Order:     Answer:   admit    WOUND CARE, DRESSING CHANGE     Wound Care:  Location: sacrum  Decubitus Wounds Stage IV - Cleanse wound location with wound cleanser, pat dry. Apply metronidazole 500 mg crushed daily mixed with 30 mL anti-fungal cream applied to wound bed. Apply gauze, calcium alginate dressing, abd pad and secure with tape. Place chux pad under wound. Change daily  and PRN if soiled or not secure. Turn every 2 hours. Standing Status:   Standing     Number of Occurrences:   1    WOUND CARE, DRESSING CHANGE     Wound Care:  Location: posterior neck   Decubitus Wounds Stage II - Cleanse wound location with wound cleanser, pat dry and apply foam dressing covering wound. Change every 3 days and PRN if soiled or not secure. Turn every 2 hours.      Standing Status:   Standing     Number of Occurrences:   5    DO NOT RESUSCITATE     Standing Status:   Standing     Number of Occurrences:   1    OXYGEN CANNULA Liters per minute: 2; Indications for O2 therapy: RESPIRATORY DISTRESS PRN Routine     Standing Status:   Standing     Number of Occurrences:   1     Order Specific Question:   Liters per minute: Answer:   2     Order Specific Question:   Indications for O2 therapy     Answer:   RESPIRATORY DISTRESS    haloperidol lactate (HALDOL) injection 2 mg    DISCONTD: haloperidol lactate (HALDOL) injection 2 mg    acetaminophen (TYLENOL) suppository 650 mg    bisacodyL (DULCOLAX) suppository 10 mg    haloperidol lactate (HALDOL) injection 2 mg    DISCONTD: haloperidol lactate (HALDOL) injection 2 mg    glycopyrrolate (ROBINUL) injection 0.2 mg    morphine injection 2 mg    DISCONTD: morphine injection 2 mg    DISCONTD: fentaNYL (DURAGESIC) 12 mcg/hr patch 1 Patch    DISCONTD: sodium chloride (NS) flush 3 mL    DISCONTD: sodium chloride (NS) flush 3 mL    DISCONTD: metroNIDAZOLE (FLAGYL) tablet 500 mg     Order Specific Question:   Antibiotic Indications     Answer: Other     Order Specific Question:   Other Abx Indication     Answer:   wound care    DISCONTD: miconazole (MICOTIN) 2 % cream    DISCONTD: morphine injection 4 mg    DISCONTD: metroNIDAZOLE (FLAGYL) tablet 500 mg     Order Specific Question:   Antibiotic Indications     Answer: Other     Order Specific Question:   Other Abx Indication     Answer:   wound care     Order Specific Question:   Suspected Organism(s)     Answer:   anaerobes in necrosis    DISCONTD: miconazole (MICOTIN) 2 % cream    morphine injection 4 mg    DISCONTD: metroNIDAZOLE (FLAGYL) tablet 500 mg     Order Specific Question:   Antibiotic Indications     Answer:    Other     Order Specific Question:   Other Abx Indication     Answer:   wound care     Order Specific Question:   Suspected Organism(s)     Answer:   anaerobes in necrosis    DISCONTD: miconazole (MICOTIN) 2 % cream    AND Linked Order Group     metroNIDAZOLE (FLAGYL) tablet 500 mg      Order Specific Question:   Antibiotic Indications Answer: Other      Order Specific Question:   Other Abx Indication      Answer:   wound care      Order Specific Question:   Suspected Organism(s)      Answer:   anaerobes in necrosis     miconazole (MICOTIN) 2 % cream    AND Linked Order Group     metroNIDAZOLE (FLAGYL) tablet 500 mg      Order Specific Question:   Antibiotic Indications      Answer: Other      Order Specific Question:   Other Abx Indication      Answer:   wound care     miconazole (MICOTIN) 2 % cream    fentaNYL (DURAGESIC) 25 mcg/hr patch 1 Patch    INITIAL PHYSICIAN ORDER: HOSPICE Level Of Care: General Inpatient; Reason for Admission: 10/21: [facility]) Admitted GIP with Alzheimer's dementia without behavioral disturbance for management of pain, agitation and wound care. Standing Status:   Standing     Number of Occurrences:   1     Order Specific Question:   Status     Answer:   Hospice     Order Specific Question:   Level Of Care     Answer:   General Inpatient     Order Specific Question:   Reason for Admission     Answer:   10/21: [facility]) Admitted GIP with Alzheimer's dementia without behavioral disturbance for management of pain, agitation and wound care. Order Specific Question:   Inpatient Hospitalization Certified Necessary for the Following Reasons     Answer:   3.  Patient receiving treatment that can only be provided in an inpatient setting (further clarification in H&P documentation)     Order Specific Question:   Admitting Diagnosis     Answer:   Alzheimer's dementia without behavioral disturbance [facility]) [[id number]]     Order Specific Question:   Terminal Prognosis Diagnosis(es)     Answer:   Alzheimer's dementia without behavioral disturbance [facility]) [[id number]]     Order Specific Question:   Admitting Physician     Answer:   Isabel Patterson     Order Specific Question:   Attending Physician     Answer:   Isabel Patterson     Order Specific Question:   Discharge Plan:     Answer: Other (Specify)    IP CONSULT TO [address] [place] [address] patients only. For contracted patients, primary hospice will continue to manage social work needs     Standing Status:   Standing     Number of Occurrences:   1     Order Specific Question:   Reason for Consult: Answer: Once on week one, then PRN for Psychosocial crisis intervention or per patient or caregiver request.       Allergies: Allergies   Allergen Reactions    Niacin Unknown (comments)    Tolectin [Tolmetin] Unknown (comments)    Naproxen Other (comments)     GI IRRITATION       Care Plan:  Multidisciplinary Problems (Active)     Problem: Anticipatory Grief     Dates: Start: 10/22/20       Disciplines: Interdisciplinary    Goal: Grief heard and acknowledged, anxiety reduced, patient coping identified, patient/family expressed gratitude     Dates: Start: 10/22/20   Expected End: 11/06/20       Description: Patient/ Family will acknowledge feelings of grief and anxiety and utilize available support including education on anticipatory grief. Disciplines: Interdisciplinary    Intervention: Assess grief responses     Dates: Start: 10/22/20       Description: [facility] will assess grief reactions with each visit. Intervention: Support grieving process     Dates: Start: 10/22/20       Description: [facility] will support the grief process by providing opportunity for open communication and offering education on anticipatory grief. Problem: Anxiety/Agitation     Dates: Start: 10/21/20       Disciplines: Interdisciplinary    Goal: Verbalize and demonstrate ability to manage anxiety     Dates: Start: 10/21/20   Expected End: 11/03/20       Description: The patient/family/caregiver will verbalize and demonstrate ability to manage the patient's anxiety throughout hospice care.     Disciplines: Interdisciplinary    Intervention: Assess for anxiety/agitation     Dates: Start: 10/21/20       Description: Assess for signs and symptoms of anxiety and agitation. Intervention: Instruction strategies to reduce anxiety/agitation     Dates: Start: 10/21/20       Description: Instruct patient/caregiver on strategies to reduce anxiety/agitation. Problem: Falls - Risk of     Dates: Start: 10/21/20       Disciplines: Interdisciplinary    Goal: *Absence of Falls     Dates: Start: 10/21/20   Expected End: 11/03/20       Description: Document Lorne Degroot Fall Risk and appropriate interventions in the flowsheet. Disciplines: Interdisciplinary                Problem: Pain     Dates: Start: 10/21/20       Disciplines: Nurse, Interdisciplinary, RT    Goal: *Control of Pain     Dates: Start: 10/21/20   Expected End: 11/03/20       Description: María Eugene will have pain control AEB a FLACC score of 2 or less.       Disciplines: Nurse, Interdisciplinary, RT    Intervention: Assess pain characteristics (eg: Intensity scale; onset; location; quality; severity; duration; frequency; radiation)     Dates: Start: 10/21/20             Intervention: Assess pain management - barriers (eg: Past pain experiences)     Dates: Start: 10/21/20             Intervention: Identify pain expectations (eg: Patient's pain goal; somatic experiences; behavioral changes; affect)     Dates: Start: 10/21/20             Intervention: Identify pain medication concerns (eg: Cultural considerations; addiction concerns)     Dates: Start: 10/21/20             Intervention: Support system identification (eg: Caregiver; community resource; family; friends; Shinto; support group)     Dates: Start: 10/21/20             Intervention: Monitor for change in patient condition (eg:  Vital signs changes; changes in level of consciousness; nausea; behavioral changes)     Dates: Start: 10/21/20             Intervention: Medication side-effect assessment     Dates: Start: 10/21/20             Intervention: Pain-relief response reassessment (eg: Frequency based on route of administration; effectiveness)     Dates: Start: 10/21/20                         Problem: Patient Education: Go to Patient Education Activity     Dates: Start: 10/21/20       Disciplines: Interdisciplinary    Goal: Patient/Family Education     Dates: Start: 10/21/20       Disciplines: Interdisciplinary                Problem: Patient Education: Go to Patient Education Activity     Dates: Start: 10/21/20       Disciplines: Interdisciplinary    Goal: Patient/Family Education     Dates: Start: 10/21/20       Disciplines: Interdisciplinary                Problem: Pressure Injury - Risk of     Dates: Start: 10/21/20       Disciplines: Interdisciplinary    Goal: *Prevention of pressure injury     Dates: Start: 10/21/20   Expected End: 11/03/20       Description: Document Yeison Scale and appropriate interventions in the flowsheet.     Disciplines: Interdisciplinary                  Care Plan Problems/Goals      Progressing Towards Goal (7)      *Prevention of pressure injury (Pressure Injury - Risk of)    Disciplines:  Interdisciplinary Expected end:  11/03/20        Outcome: Progressing Towards Goal By Josafat Menjivar RN on 10/28/20 6389            Patient/Family Education (Patient Education: Go to Patient Education Activity)    Disciplines:  Interdisciplinary Expected end:  -        Outcome: Progressing Towards Goal By Nba Delaney on 10/26/20 0333            *Absence of Falls (Falls - Risk of)    Disciplines:  Interdisciplinary Expected end:  11/03/20        Outcome: Progressing Towards Goal By Josafat Menjivar RN on 10/28/20 8681            Patient/Family Education (Patient Education: Go to Patient Education Activity)    Disciplines:  Interdisciplinary Expected end:  -        Outcome: Progressing Towards Goal By Nba Delaney on 10/26/20 0333            *Control of Pain (Pain)    Disciplines:  Nurse, Interdisciplinary, RT Expected end:  11/03/20        Outcome: Progressing Towards Goal By Paulette Fernandez RN on 10/28/20 2341            Verbalize and demonstrate ability to manage anxiety (Anxiety/Agitation)    Disciplines:  Interdisciplinary Expected end:  11/03/20        Outcome: Progressing Towards Goal By David Garcia RN on 10/28/20 5553            Grief heard and acknowledged, anxiety reduced, patient coping identified, patient/family expressed gratitude (Anticipatory Grief)    Disciplines:  Interdisciplinary Expected end:  11/06/20        Outcome: Progressing Towards Goal By Golden Gonzalez on 10/26/20 0333                              ___________________    Care Team Notes          POC/IDG Notes      [facility] IDG  Notes by Harpreet Barrera at 10/29/20 1243  Version 1 of 1    Author:  Harpreet Barrera Service:  Spiritual Care Author Type:  Pastoral Care    Filed:  10/29/20 1244 Date of Service:  10/29/20 1243 Status:  Signed    :  Harpreet Barrera (Pastoral Care)       Patient: Maxine Devi    Date: 10/29/20  Time: 12:43 PM    [facility]  Notes  Intervention: Ministry of presence, family care, prayer and scripture. Outcome:  expressed appreciation for the care of his wife, patient appeared to be obtunded. Plan: continue to provide spiritual and emotional support. Signed by: Sheldon John       [address] [place] [address] Nurse Notes by Rachel De La Torre RN at 10/29/20 1206  Version 1 of 1    Author:  Rachel De La Torre RN Service:  Hospice and Palliative Care Author Type:  Registered Nurse    Filed:  10/29/20 1206 Date of Service:  10/29/20 1206 Status:  Signed    :  Rachel De La Torre RN (Registered Nurse)       Patient: Maxine Devi    Date: 10/29/20  Time: 12:06 PM    [facility] Nurse Notes  F/U IDG: Pt is an 45-year-old female with Alzheimers who is here GIP level of care from [facility] in home program for management of pain and complicated wound care and agitation. Afebrile. Responds only to painful stimuli. Pt is incontinent in briefs x 2. No IV access.   PO intake: none x 8 days. Wounds: stage IV wound to sacrum with daily dressing changes, stage II to back of neck with foam dressing. Pt is on air mattress for comfort. PRN medications: Haldol 2 mg SQ x 2 for agitation, morphine 2 mg SQ x 2 for pain. Scheduled meds:  Fentanyl 25 mcg patch (increased 10/28). Plan: Discontinue scheduled vital signs. Comprehensive plan of care reviewed. IDG and pt./family in agreement with plan of care. The IDG identifies through on-going assessment when a change is needed to the POC; the pt/family will receive care and services necessitated by changes in POC. Medications reviewed by the pharmacist and Medical Director. Signed by: Cornel Mcclelland RN       [facility] IDG  Notes by Alli Hunter LMSW at 10/29/20 1134  Version 1 of 1    Author:  Alli Hunter LMSW Service:  Licensed Clinical  Author Type:      Filed:  10/29/20 1134 Date of Service:  10/29/20 1134 Status:  Signed    :  Alli Hunter LMSW ()       SW to assess coping and needs each visit and offer availability. [facility] IDG  Notes by Colletta Osler, LMSW at 10/26/20 1756  Version 1 of 1    Author:  Colletta Osler, LMSW Service:  Licensed Clinical  Author Type:  Licensed Masters in Social Work    [address] [place] [address] Way:  10/26/20 [address] [place] [address] Date of Service:  10/26/20 1756 Status:  Signed    :  Colletta Osler, LMSW (Licensed Masters in Social Work)       Pt/family are receiving emotional support AEB pt/family verbalize emotional needs are addressed each visit.        [facility] IDG Nurse Notes by Natanael Jaramillo RN at 10/26/20 1258  Version 1 of 1    Author:  Natanael Jaramillo RN Service:  Hospice and Palliative Care Author Type:  Registered Nurse    Filed:  10/26/20 1258 Date of Service:  10/26/20 1258 Status:  Signed    :  Natanael Jaramillo RN (Registered Nurse)       Patient: Curly Hayden    Date: 10/26/20  Time: 12:58 PM    [facility] Nurse Notes  F/U IDG: Pt is an 72-year-old female with Alzheimers who is here GIP level of care from [facility] in home program for management of wounds, pain and agitation. Temp 100.5 ax yesterday but is back to normal range today. Macias with UOP of 150 cc. IV access. PO intake: none. 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 Pt is requiring frequent dressing changes. Pt is on air mattress for comfort. PRN medications: Haldol 2 mg SQ x 3 for agitation, morphine 2 mg SQ x 3 for pain. Scheduled meds:  Fentanyl 12 mcg patch, antifungal with dressing changes. Plan: Dressing changes q 12 hours to debride wound. Add calcium alginate to wound care orders. Comprehensive plan of care reviewed. IDG and pt./family in agreement with plan of care. The IDG identifies through on-going assessment when a change is needed to the POC; the pt/family will receive care and services necessitated by changes in POC. Medications reviewed by the pharmacist and Medical Director. Signed by: Mars Soares RN       [facility] IDG Other Notes by Haroldo Maradiaga at 10/26/20 1148  Version 1 of 1    Author:  Haroldo Maradiaga Service:  Spiritual Care Author Type:  Pastoral Care    Filed:  10/26/20 1149 Date of Service:  10/26/20 1148 Status:  Signed    :  Haroldo Maradiaga (Pastoral Care)          MSW continues to be available for support as needed, requested or referred. [facility]  Notes by Haroldo Maradiaga at 10/26/20 1146  Version 1 of 1    Author:  Haroldo Maradiaga Service:  Spiritual Care Author Type:  Pastoral Care    Filed:  10/26/20 1148 Date of Service:  10/26/20 1146 Status:  Signed    :  Haroldo Maradiaga (Pastoral Care)       Patient: Calin Flores    Date: 10/26/20  Time: 11:46 AM    [facility]  Notes    Intervention: Ministry of presence, prayer, conversation with >    Outcome: Patient appeared to be sleeping soundly. Met . He has difficulty hearing.   He was pleasant and appreciative of all the care his wife is receiving. Plan: Continue to provide spiritual and emotional support throughtout patience's time at [facility]. Signed by: Raman Hagan       [facility] IDG Nurse Notes by Roseanna Redd RN at 10/23/20 1413  Version 1 of 1    Author:  Roseanna Redd RN Service:  Hospice and Palliative Care Author Type:  Registered Nurse    Filed:  10/23/20 1414 Date of Service:  10/23/20 1413 Status:  Signed    :  Roseanna Redd RN (Registered Nurse)       Patient: Yesica Flores    Date: 10/23/20  Time: 2:13 PM    [facility] Nurse Notes  1st IDG: Pt is an 59-year-old female with Alzheimers disease who is here GIP level of care from [facility] in home program for management of pain, agitation and wound care. Temp 100.3 ax. [place] with UOP of 25 cc also leaking. PO intake: none. IV access in right wrist.  Wounds: stage III on sacrum. PRN medications: Haldol 2 mg SQ/IV x 7 for agitation, morphine 2 mg IV/SQ x 8 for pain. Scheduled meds:  fentanyl 12 mcg patch added yesterday. Plan: Add antifungal cream/flagyl for wound care with gauze/ABD pad q 48 hours. Comprehensive plan of care reviewed. IDG and pt./family in agreement with plan of care. The IDG identifies through on-going assessment when a change is needed to the POC; the pt/family will receive care and services necessitated by changes in POC. Medications reviewed by the pharmacist and Medical Director. Signed by: Ajit Rea RN       [facility] IDG  Notes by Brianna Healy at 10/23/20 1236  Version 1 of 1    Author:  Brianna Healy Service:  Spiritual Care Author Type:  Pastoral Care    Filed:  10/23/20 1242 Date of Service:  10/23/20 1236 Status:  Signed    :  Brianna Healy (Pastoral Care)       Patient: Yesica Flores    Date: 10/23/20  Time: 12:36 PM    [facility]  Notes    Intervention: Ministry of presence. Prayer and completion of assessments. Outcome; Patient resting with eyes closed.       Plan: Continue to provide spiritual and emotional support. Signed by: Rufus Large       [facility] IDG  Notes by Sherren Penton, LMSW at 10/23/20 1049  Version 1 of 1    Author:  Sherren Penton, LMSW Service:  Licensed Clinical  Author Type:      Filed:  10/23/20 1050 Date of Service:  10/23/20 1049 Status:  Signed    :  Sherren Penton, LMSW ()       SW to assess coping and needs each visit and offer availability. [facility] IDG  Notes by Sebastián Villalta at 10/22/20 0946  Version 1 of 1    Author:  Sebastián Villalta Service:  Spiritual Care Author Type:  Pastoral Care    Filed:  10/22/20 0947 Date of Service:  10/22/20 0946 Status:  Signed    :  Sebastián Villalta (Pastoral Care)       Patient: Aayush Fernandez    Date: 10/22/20  Time: 9:46 AM    [facility]  Notes    / Grief Counselor has reviewed  Initial Comprehensive Assessment and plan of care. Bereavement and Spiritual Care Assessments to be completed and plan of care put in place to meet the needs, requests and referrals. Signed by: Rufus Large       [facility] IDG Nurse Notes by Vonnie John RN at 10/21/20 2140  Version 1 of 1    Author:  Vonnie John RN Service:  -- Author Type:  Registered Nurse    Filed:  10/21/20 2222 Date of Service:  10/21/20 2140 Status:  Signed    :  Vonnie John RN (Registered Nurse)       Patient: Aayush Feranndez    Date: 10/21/20  Time: 9:40 PM    [facility] Nurse Notes    Patient is an 35-year-old female admitted from the in-home program for [facility] level of care with a hospice diagnosis of Alzheimer's for management of pain, agitation, and wound care. The patient has had an acceleration of her Alzheimer's debility related to her right gluteus medius, iliopsoas, and hamstring tendon injury. The patient has had more fecal and urinary incontinence and more disorientation.   The patient's Stage III wound has also made care for her at home more complex. The patient has come to [facility] for wound care and to better manage the patient's pain and agitation with injectable medications. On admission, patient was noted to be grimacing and moaning. FLACC 5/10. Patient was medicated with PRN Morphine 2 mg SQ for pain and PRN Haldol 2 mg SQ for agitation. On admission, the patient's miles catheter was noted to be leaking. New miles catheter was placed via sterile technique with CNA assist.  Allevyn to sacrum was removed and a wet to moist dressing was applied to the sacrum per orders. Patient tolerated the procedure fair. Patient has no IV access. Patient was alert but was nonverbal with this nurse. Bilateral hands were noted to be contracted. Patient on air mattress for pressure relief. No family was here at time of admission. Medications found in patient suitcase and locked up per facility policy. Admission complete and hospice care plan initiated which includes spiritual, psychosocial, and bereavement. No initial needs identified. IDG team, including MD, made aware of care plans. Volunteer services suspended due to COVID-19.       Signed by: Zelda Alonso RN                Care Team Present:   Team Members Present: (see sign in sheet for attendees)

## 2020-10-29 NOTE — [facility] IDG CHAPLAIN NOTES
Patient: Calin Flores    Date: 10/29/20  Time: 12:43 PM    [facility]  Notes  Intervention: Ministry of presence, family care, prayer and scripture. Outcome:  expressed appreciation for the care of his wife, patient appeared to be obtunded. Plan: continue to provide spiritual and emotional support.          Signed by: Rohit Watson

## 2020-10-29 NOTE — PROGRESS NOTES
Assumed care of pt from Shelly Patrick RN. Pt resting with eyes closed and no s/s of distress at this time.  present and would like to speak with MD. Dr Nida Blood notified of 's request.  Dressing to sacral area changed by Shelyl Patrick prior to this RN assuming care. 1545 Pt continuing with rest with no s/s of distress noted. 1702 RR regular and even. FLACC 0. No s/s of pain, agitation or dyspnea at this time.

## 2020-10-29 NOTE — PROGRESS NOTES
Problem: Pressure Injury - Risk of  Goal: *Prevention of pressure injury  Description: the patient will not develop other pressure areas during stay. Outcome: Progressing Towards Goal  Note: Pressure Injury Interventions:  Sensory Interventions: Assess changes in LOC, Avoid rigorous massage over bony prominences, Check visual cues for pain, Float heels, Minimize linen layers    Moisture Interventions: Absorbent underpads, Apply protective barrier, creams and emollients, Limit adult briefs, Minimize layers, Moisture barrier    Activity Interventions: Assess need for specialty bed, Pressure redistribution bed/mattress(bed type)    Mobility Interventions: Assess need for specialty bed, Float heels, HOB 30 degrees or less    Nutrition Interventions: Document food/fluid/supplement intake    Friction and Shear Interventions: Apply protective barrier, creams and emollients, Lift sheet, Minimize layers                Problem: Pain  Goal: *Control of Pain  Description: Brianda Stalling will have pain control AEB a FLACC score of 2 or less. Fentanyl patch scheduled 25 mcg  Morphine 2 mg/ 4mg ( wound care) q 20 min prn. Outcome: Progressing Towards Goal     Problem: Anxiety/Agitation  Goal: Verbalize and demonstrate ability to manage anxiety  Description: the patient's anxiety/agitation will be controlled during shift and patient will have relaxed body features aeb no picking at clothing, groaning, or restlessness.   Haldol 2 mg q 1 hr prn    Outcome: Progressing Towards Goal

## 2020-10-29 NOTE — [facility] IDG NURSE NOTES
Patient: Ryan Rosen    Date: 10/29/20  Time: 12:06 PM    [facility] Nurse Notes  F/U IDG: Pt is an 80-year-old female with Alzheimers who is here GIP level of care from [facility] in home program for management of pain and complicated wound care and agitation. Afebrile. Responds only to painful stimuli. Pt is incontinent in briefs x 2. No IV access. PO intake: none x 8 days. Wounds: stage IV wound to sacrum with daily dressing changes, stage II to back of neck with foam dressing. Pt is on air mattress for comfort. PRN medications: Haldol 2 mg SQ x 2 for agitation, morphine 2 mg SQ x 2 for pain. Scheduled meds:  Fentanyl 25 mcg patch (increased 10/28). Plan: Discontinue scheduled vital signs. Comprehensive plan of care reviewed. IDG and pt./family in agreement with plan of care. The IDG identifies through on-going assessment when a change is needed to the POC; the pt/family will receive care and services necessitated by changes in POC. Medications reviewed by the pharmacist and Medical Director.         Signed by: Pratibha Flores RN

## 2020-10-29 NOTE — PROGRESS NOTES
1900- Report received, patient resting quietly in bed with no s/sx pain or distress. Fentanyl patch intact. Call light within reach. Bed is low and locked, tab alert in place, and door left open for continuous monitoring. 2039- Patient resting with eyes closed. No s/sx of pain or distress. 2234- Patient resting with eyes closed. No s/sx of pain or distress. 2300- prn morphine 2 mg and prn haldol 2 mg given prior to changing patient. flacc 0/10 at this time. Eyes closed. 2348- prn morphine given for pain with changing intcont brief. Small smear of a bm cleaned up as well. Dressing to sacrum is C/D/I. Turned to R side. flacc 8/10.    0032- flacc 0/10. Patient resting with eyes closed. No s/sx of pain or distress. 0150- Patient resting with eyes closed. No s/sx of pain or distress. 0345- Patient resting with eyes closed. No s/sx of pain or distress. 4947- prn morphine and haldol administered prior to wound care and brief change for pain and agitation with care. 0600- incontinent wet brief change. Back of neck dsg changed per orders. Wound yellow/green and slough removed with cleansing. Cleansed with wound cleanser and allevyn applied. Taped secure as allevyn does not have borders. Patient turned to R side. 3129- post wound care morphine administered for flacc of 3/10.      0630- flacc 0/10. prns effective    Report given to BLANE Geller.

## 2020-10-29 NOTE — PROGRESS NOTES
Progress Note    Patient: Twin Hitchcockan MRN: [id number]  SSN: [id number]    YOB: 1932  Age: 80 y.o. Sex: female      Admit Date: 10/21/2020    LOS: 8 days     Clinical Summary:     Isa Kearns is well sedated but does require parenteral narcotics to allow for proper wound care. There is mild lung congestion but no respiratory distress at the present time. Vitals:    10/27/20 1637 10/28/20 0508 10/28/20 1648 10/29/20 0535   BP: (!) 137/94 136/77 128/79    Pulse: 78 71 (!) 119    Resp: 20 22 14    Temp:  98 °F (36.7 °C) 98.2 °F (36.8 °C) 98.2 °F (36.8 °C)            Clinical Assessment:     Principal Problem:    Alzheimer's dementia without behavioral disturbance ([facility] [address] (10/21/2020)    Active Problems:    Anorexia (8/27/2020)      Unintentional weight loss (8/27/2020)      Debility (8/27/2020)      Dehydration (8/27/2020)      Hypertension (8/27/2020)      Hospice care (9/9/2020)      Ataxia (10/23/2020)      History of hip fracture (10/23/2020)      Falls, sequela (10/23/2020)      Muscle injury (10/23/2020)        Treatment Plan:      I have reviewed the patient's Plan of Care with the nursing staff. She remains comfortable and death is anticipated within the week.           Current Facility-Administered Medications   Medication Dose Route Frequency    fentaNYL (DURAGESIC) 25 mcg/hr patch 1 Patch  1 Patch TransDERmal Q72H    metroNIDAZOLE (FLAGYL) tablet 500 mg  500 mg Topical DAILY    And    miconazole (MICOTIN) 2 % cream   Topical DAILY    metroNIDAZOLE (FLAGYL) tablet 500 mg  500 mg Topical PRN    And    miconazole (MICOTIN) 2 % cream   Topical PRN    morphine injection 4 mg  4 mg SubCUTAneous Q20MIN PRN    haloperidol lactate (HALDOL) injection 2 mg  2 mg SubCUTAneous Q1H PRN    acetaminophen (TYLENOL) suppository 650 mg  650 mg Rectal Q3H PRN    bisacodyL (DULCOLAX) suppository 10 mg  10 mg Rectal PRN    haloperidol lactate (HALDOL) injection 2 mg  2 mg SubCUTAneous Q1H PRN    glycopyrrolate (ROBINUL) injection 0.2 mg  0.2 mg SubCUTAneous Q4H PRN    morphine injection 2 mg  2 mg SubCUTAneous Q20MIN PRN           Signed By: Doc Rubio MD     October 29, 2020

## 2020-10-29 NOTE — PROGRESS NOTES
1905- Report received, patient resting quietly in bed with no s/sx pain or distress. fent patch intact. Call light within reach. Bed is low and locked, tab alert in place, and door left open for continuous monitoring. 2007- prn dilaudid and haldol given prior to bath. Patient tolerated stick well and did not react. 2030- patient receiving bath. flacc 8/10 aeb moaning, grimacing, tenseness. Assessment completed. patiet pale and breathing shallow, opens eye to stimulus. Dressing to sacrum CDI. Small amount on incont urine in brief. Patient turned to R side after bath. 2106- patient settled at this time after bath with flacc 0/10.     2230- Patient resting with eyes closed. No s/sx of pain or distress. 0030- Patient resting with eyes closed. No s/sx of pain or distress. 9701- Patient resting with eyes closed. No s/sx of pain or distress. 0345- Patient resting with eyes closed. No s/sx of pain or distress. 6255- incontinent brief changed and patient turned to R side. Wash cloths placed in contracted hands. Patient tolerated poorly and cried with care. Did not settle on her own. Prn morphine given for pain aeb grimacing and crying. flacc 8/10.    0615- flacc 0/10, eyes closed.     Report given to Nasir Webb

## 2020-10-30 NOTE — PROGRESS NOTES
Problem: Pressure Injury - Risk of  Goal: *Prevention of pressure injury  Description: the patient will not develop other pressure areas during stay. Outcome: Progressing Towards Goal  Note: Pressure Injury Interventions:  Sensory Interventions: Assess changes in LOC, Avoid rigorous massage over bony prominences, Check visual cues for pain, Float heels, Minimize linen layers    Moisture Interventions: Absorbent underpads, Apply protective barrier, creams and emollients, Limit adult briefs, Minimize layers, Moisture barrier    Activity Interventions: Assess need for specialty bed, Pressure redistribution bed/mattress(bed type)    Mobility Interventions: Assess need for specialty bed, Float heels, HOB 30 degrees or less    Nutrition Interventions: Document food/fluid/supplement intake    Friction and Shear Interventions: Apply protective barrier, creams and emollients, Lift sheet, Minimize layers                Problem: Pain  Goal: *Control of Pain  Description: Rae Rosales will have pain control AEB a FLACC score of 2 or less. Fentanyl patch scheduled 25 mcg  Morphine 2 mg/ 4mg ( wound care) q 20 min prn. Outcome: Progressing Towards Goal     Problem: Anxiety/Agitation  Goal: Verbalize and demonstrate ability to manage anxiety  Description: the patient's anxiety/agitation will be controlled during shift and patient will have relaxed body features aeb no picking at clothing, groaning, or restlessness.   Haldol 2 mg q 1 hr prn    Outcome: Progressing Towards Goal

## 2020-10-30 NOTE — PROGRESS NOTES
0640: Walking rounds with off going RN. Pt was admitted 10/21/2020 from [facility] in home program for [facility] level care with a diagnosis of Alzheimer's disease. Pt is needing management for pain, agitation and wound care. She received 3 doses of Morphine and 2 doses of Haldol during the night to promote comfort. Presently pt is on an air mattress to relieve pressure, resting with eyes closed, resps even and regular. No s/sx of pain or discomfort observed. FLACC score 0/10. Fentanyl 12mcg/hr patch observed to left upper arm. 0730: Pt medicated with Morphine 2mg and Haldol 2mg for agitation and to promote comfort during bath. Drsg saturated so changed at this time. 2135: Medicated with Morphine 4mg SC to promote comfort after dressing change and bath. Pt with increase moaning and facial grimacing. FLACC score 10/10. Repositioned on air mattress at this time. 0236: Pt now resting with eyes closed, no further signs or symptoms of discomfort or pain observed. FLACC score now 0/10.     1100: Pt resting with eyes closed, resps even and regular. No s/sx of pain or other discomfort observed. FLACC score 0/10.     1300: Report given to oncoming RN. Per pt mother , Endocrinologist sent pt to Peds ED due to pt urinating a lot, drinking less. No other symptoms . H/o brain tumor 2016

## 2020-10-30 NOTE — PROGRESS NOTES
1300- The patient report and walking rounds completed with Lori Ortiz RN. The patient is identified by name and . The nonverbal pain scale is 0/10. No signs of anxiety, SOB or nausea / vomit observed. The patient has a tab alert in place. The door to the room is left open for close observation. 1525- The patient Fentanyl was increased to 50 mcg. Old Fentanyl removed and destroyed with [employer]. 1630- The patient has dry and intact dressing to her sacral.     Reported off to Jennifer Niño RN and completed walking rounds.

## 2020-10-30 NOTE — PROGRESS NOTES
Progress Note    Patient: Phi Colin MRN: [id number]  SSN: [id number]    YOB: 1932  Age: 80 y.o. Sex: female      Admit Date: 10/21/2020    LOS: 9 days     Subjective:     Agitated with stimuli. NPO. Has required Morphine 2mg x 5 SQ for pain, Morphine 4mg x 2 SQ with wound care and haldol x 5 for agitation. Review of Systems:  Review of systems not obtained due to patient factors. Objective:     Vitals:    10/28/20 0508 10/28/20 1648 10/29/20 0535 10/29/20 1357   BP: 136/77 128/79  (!) 162/77   Pulse: 71 (!) 119  (!) 107   Resp: 22 14 28   Temp: 98 °F (36.7 °C) 98.2 °F (36.8 °C) 98.2 °F (36.8 °C) 98.8 °F (37.1 °C)        Intake and Output:  Current Shift: No intake/output data recorded. Last three shifts: No intake/output data recorded. Physical Exam:   GENERAL: moderate distress with stimuli, appears older than stated age, pale, cachectic  LUNG: Clear diminished breath sounds. Shallow and unlabored respirations. HEART: regular rate and rhythm  ABDOMEN: soft, non-tender. Bowel sounds hypoactive. : Incontinent. EXTREMITIES:  extremities with no cyanosis or edema. + pulses bilaterally. Upper extremities contracted. SKIN: Pale. Warm to touch. 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 NEUROLOGIC: Agitated with stimuli, screams out when touched. Profound weakness. Bedbound. PSYCHIATRIC: agitated    Lab/Data Review:  No new labs resulted in the last 24 hours.     Assessment:     Principal Problem:    Alzheimer's dementia without behavioral disturbance ([facility] [address] (10/21/2020)    Active Problems:    Anorexia (8/27/2020)      Unintentional weight loss (8/27/2020)      Debility (8/27/2020)      Dehydration (8/27/2020)      Hypertension (8/27/2020)      Hospice care (9/9/2020)      Ataxia (10/23/2020)      History of hip fracture (10/23/2020)      Falls, sequela (10/23/2020)      Muscle injury (10/23/2020)        Plan:     Current Facility-Administered Medications   Medication Dose Route Frequency    fentaNYL (DURAGESIC) 25 mcg/hr patch 1 Patch  1 Patch TransDERmal Q72H    metroNIDAZOLE (FLAGYL) tablet 500 mg  500 mg Topical DAILY    And    miconazole (MICOTIN) 2 % cream   Topical DAILY    metroNIDAZOLE (FLAGYL) tablet 500 mg  500 mg Topical PRN    And    miconazole (MICOTIN) 2 % cream   Topical PRN    morphine injection 4 mg  4 mg SubCUTAneous Q20MIN PRN    haloperidol lactate (HALDOL) injection 2 mg  2 mg SubCUTAneous Q1H PRN    acetaminophen (TYLENOL) suppository 650 mg  650 mg Rectal Q3H PRN    bisacodyL (DULCOLAX) suppository 10 mg  10 mg Rectal PRN    haloperidol lactate (HALDOL) injection 2 mg  2 mg SubCUTAneous Q1H PRN    glycopyrrolate (ROBINUL) injection 0.2 mg  0.2 mg SubCUTAneous Q4H PRN    morphine injection 2 mg  2 mg SubCUTAneous Q20MIN PRN       10/21: ([facility] pt-Osiris RN) Admitted GIP with Alzheimer's dementia without behavioral disturbance for management of pain, agitation and wound care.      1. Pain: Morphine 2mg IV/SQ Q20 minutes as needed. Fentanyl 25mcg patch in place.      2. Agitation: Haloperidol 2mg IV/SQ Q1 hour prn.     3. Wound Care: Turn and reposition Q2 hours and prn. Wound care as ordered for sacral wound. Air mattress in place for pressure reduction. Morphine 4mg IV/SQ prn dressing change.     4. Family/Pt Support: No family at bedside during exam. Medications and plan of care discussed with nursing staff. Will continue to monitor for symptoms and adjust medications as needed to maintain patient comfort. PPS 10%. Case discussed with Dr. Sobeida Yadav. Increase fentanyl to 50mcg.       Signed By: Shanda Barraza NP     October 30, 2020

## 2020-10-30 NOTE — PROGRESS NOTES
1845 Report received from Mario Cannon RN with walking rounds. Patient identified by name and . Patient resting quietly in bed with eyes closed. No signs or symptoms of distress noted at present. Fentanyl patch confirmed to left chest.  Bed in low and locked position, side rails up x2, call bell within reach, tab alarm in place. No family present at bedside. Door open for safety.  Shift assessment completed, patient is very stiff during attempts to assess, she yells out and grimaces when touched. Lung sounds clear, patient appears pale. Extremities warm and dry, bowel sounds active. Patient's level of care is general inpatient for management of pain, agitation, and complex wound care. Her hospice diagnosis is Alzheimers disease without behavioral disturbance. 2352 Gave PRN doses of morphine 2 mg and haldol 2 mg SC as premed for turning and brief change. 0025 Patient cries out and grimaces when turned. Her sacral dressing is saturated and draining into her brief. 0032 Gave morphine 4 mg SC premed for dressing change. 0100 Wound care provided to gaping stage 4 sacral wound per orders. Per chart review, patient's  was using Listerine and a \"clotting powder\" on this wound despite explicit instructions by hospice RN not to use these products. Wound is draining copious amounts of serosanguinous drainage. Patient tolerated wound care fairly, occasional moaning and grimacing but not yelling out.    0252 Patient resting quietly in bed with eyes closed, respirations even and unlabored. FLACC 0/10, no signs of distress or discomfort. Door open for patient's safety. 0863 Patient resting in bed with her eyes closed. Respirations are irregular and consistent with Cheyne-Remy pattern, apnea lasting up to 20 seconds. See flowsheets for additional patient observation/rounds. Report given to Mario Cannon RN.

## 2020-10-30 NOTE — PROGRESS NOTES
Problem: Pressure Injury - Risk of  Goal: *Prevention of pressure injury  Description: Document Yeison Scale and appropriate interventions in the flowsheet. Outcome: Progressing Towards Goal  Note: Pressure Injury Interventions:  Sensory Interventions: Assess changes in LOC, Avoid rigorous massage over bony prominences, Check visual cues for pain, Float heels    Moisture Interventions: Absorbent underpads, Apply protective barrier, creams and emollients, Contain wound drainage, Limit adult briefs, Minimize layers, Moisture barrier    Activity Interventions: Pressure redistribution bed/mattress(bed type)    Mobility Interventions: Float heels, HOB 30 degrees or less, Pressure redistribution bed/mattress (bed type)    Nutrition Interventions: Document food/fluid/supplement intake    Friction and Shear Interventions: Apply protective barrier, creams and emollients, Lift sheet, Lift team/patient mobility team, Minimize layers                Problem: Falls - Risk of  Goal: *Absence of Falls  Description: Document Soraya Fall Risk and appropriate interventions in the flowsheet. Outcome: Progressing Towards Goal  Note: Fall Risk Interventions:       Mentation Interventions: Adequate sleep, hydration, pain control, Evaluate medications/consider consulting pharmacy, Door open when patient unattended, Bed/chair exit alarm    Medication Interventions: Bed/chair exit alarm, Evaluate medications/consider consulting pharmacy    Elimination Interventions: Bed/chair exit alarm, Patient to call for help with toileting needs, Toileting schedule/hourly rounds              Problem: Falls - Risk of  Goal: *Absence of Falls  Description: Document Joanne Mojica Fall Risk and appropriate interventions in the flowsheet.   Outcome: Progressing Towards Goal  Note: Fall Risk Interventions:       Mentation Interventions: Adequate sleep, hydration, pain control, Evaluate medications/consider consulting pharmacy, Door open when patient unattended, Bed/chair exit alarm    Medication Interventions: Bed/chair exit alarm, Evaluate medications/consider consulting pharmacy    Elimination Interventions: Bed/chair exit alarm, Patient to call for help with toileting needs, Toileting schedule/hourly rounds              Problem: Pain  Goal: *Control of Pain  Description: Lorren Burkitt will have pain control AEB a FLACC score of 2 or less. Outcome: Progressing Towards Goal     Problem: Anxiety/Agitation  Goal: Verbalize and demonstrate ability to manage anxiety  Description: The patient/family/caregiver will verbalize and demonstrate ability to manage the patient's anxiety throughout hospice care.   Outcome: Progressing Towards Goal

## 2020-10-31 NOTE — PROGRESS NOTES
Problem: Pressure Injury - Risk of  Goal: *Prevention of pressure injury  Description: Document Yeison Scale and appropriate interventions in the flowsheet. Outcome: Progressing Towards Goal  Note: Pressure Injury Interventions:  Sensory Interventions: Assess changes in LOC, Assess need for specialty bed, Float heels, Keep linens dry and wrinkle-free, Minimize linen layers    Moisture Interventions: Absorbent underpads, Apply protective barrier, creams and emollients, Assess need for specialty bed, Minimize layers, Moisture barrier    Activity Interventions: Pressure redistribution bed/mattress(bed type)    Mobility Interventions: Float heels, HOB 30 degrees or less    Nutrition Interventions: Document food/fluid/supplement intake    Friction and Shear Interventions: Apply protective barrier, creams and emollients, Foam dressings/transparent film/skin sealants, HOB 30 degrees or less, Lift sheet     No new pressure injuries. Problem: Falls - Risk of  Goal: *Absence of Falls  Description: Document Deborah Sarah Fall Risk and appropriate interventions in the flowsheet. Outcome: Progressing Towards Goal  Note: Fall Risk Interventions:       Mentation Interventions: Adequate sleep, hydration, pain control, Bed/chair exit alarm, Door open when patient unattended, Room close to nurse's station, Update white board    Medication Interventions: Bed/chair exit alarm    Elimination Interventions: Bed/chair exit alarm, Call light in reach              Problem: Pain  Goal: *Control of Pain  Description: Rizwan Guevara will have pain control AEB a FLACC score of 2 or less. Outcome: Progressing Towards Goal   Patient was medicated times 1 with Morphine 2 mg for pain and times 1 with 4 mg for pain at dressing change. Both medication times had good results.

## 2020-10-31 NOTE — PROGRESS NOTES
1845 Report received from Marisol Allen RN with walking rounds. Patient identified by name and . Patient resting quietly in bed with eyes closed. No signs or symptoms of distress noted at present. Fentanyl patch confirmed to left chest.  Bed in low and locked position, side rails up x2, call bell within reach, tab alarm in place. No family present at bedside. Door open for safety.  Shift assessment completed. Attempted mouth care but patient bites down on swab. Opens eyes and grimaces with any touch or stimulation. Breathing is shallow and irregular, lung sounds diminished. Bowel sounds absent. Lower extremities cool without palpable pulses. Brief dry at present. Patient remains general inpatient level of care for management of pain and complex wound care. Her hospice diagnosis is Alzheimer's dementia without behavioral disturbance. 210 Patient resting quietly in bed with eyes closed, respirations even and unlabored. FLACC 0/10, no signs of distress or discomfort. Door open for patient's safety. 4993 Patient resting quietly in bed with eyes closed, respirations even and unlabored. FLACC 0/10, no signs of distress or discomfort. Door open for patient's safety. 0149 Patient making quiet whimpering sounds with occasional grimacing. Gave PRN doses of morphine 4 mg SC for pain and haldol 2 mg SC as premedication for checking wound. 0200 Documentation prior to this point reflects events occurring before time change marking the end of Daylight Saving Time at 2:00 a.m.     0128 Brief changed and pericare provided, patient had voided small amount of dark urine. Sacral dressing remains clean, dry, and intact and not due to be changed at this time. 7832 Patient resting quietly in bed with eyes closed, respirations are irregular Cheyne-Remy pattern with apneic pauses lasting 20-22 seconds. FLACC 0/10, no signs of distress or discomfort. Door open for patient's safety.     2182 Repositioned in bed, patient's brief is dry at this time. FLACC 0/10. See flowsheets for additional patient observation/rounds. Report given to Neil Desouza RN.

## 2020-10-31 NOTE — PROGRESS NOTES
5039- The patient report taken from Prema Campos RN. Walking rounds completed. The patient was identified by name and . The patient is GIP with diagnosis of Alzheimers dementia. We are doing wound care and treating symptoms of pain and anxiety. The patient has Fentanyl patch to Left chest. No signs of anxiety, SOB or nausea / vomit observed. The pain level is 0/10 using the non verbal scale. The bed is low and locked with two bed rails up and the tab alert in place. The patient's room is near to the nurses station and the door is left open as the patient is alone. 5009- The patient is being bathed and is grimacing and is very restless. Medicated with Haldol 2 mg subcutaneous for agitation and with Morphine 2 mg subcutaneous for pain. Assisted the CNA to finish the bath and position the patient. 0800- The patient is now resting quietly in the bed with no signs of distress observed. Facial features are relaxed and no signs of tears. No restlessness now. 1000- The pillow was removed from the left side and the patient is now on the right side. Patient tolerated well. Mouth care done. 1200- The patient's  has arrived and was updated on the patient's condition. He voiced understanding. The patient is resting quietly in the bed. 1400- The pillow was removed from the right side and the patient is now supine and resting quietly in the bed. She tolerated the slight turn without any signs of distress. 1605- Administered Haldol 2 mg subcutaneous for restlessness and Morphine 4 mg subcutaneous for pain with dressing change. Removed old dressing from sacral and neck and cleaned and dresses per order. The patient tolerated well. 1630- The patient is resting quietly in the bed with relaxed facial features. Respirations are even and unlabored on room air but shallow at 10 minute. Reported off to Prema Campos RN and completed walking rounds.

## 2020-11-01 NOTE — PROGRESS NOTES
0640: Walking rounds with off going RN. Pt was admitted 10/21/2020 from [facility] in home program for [facility] level care with a diagnosis of Alzheimer's disease. Pt is needing management for pain, agitation and wound care. She received 1 dose of Morphine and 1 dose of Haldol during the night to promote comfort. Presently pt is on an air mattress to relieve pressure, resting with eyes closed, resps even and regular. No s/sx of pain or discomfort observed. FLACC score 0/10. Fentanyl 50mcg/hr patch observed to left chest.    0834: Pt medicated with Haldol 2mg and Morphine 4mg SC prior to dressing change and bath. 0900: Pt resting with eyes closed, resps shallow but regular. No s/sx of pain or agitation at this time. 1100: Pt continues to rest in bed with eyes closed, resps even and regular. No s/sx of pain or other discomfort observed. FLACC score 0/10.     1145: Spouse arrived to the room to visit. Pt remains without change. FLACC score 0/10.     1345: Ice given to spouse upon request. Pt remains in bed with eyes closed, resps shallow but not labored. FLACC score 0/10. pcp

## 2020-11-01 NOTE — PROGRESS NOTES
1438 Report received from [facility], RN. Pt is currently resting with her eyes closed. No signs or symptoms of pain or distress, FLACC 0/10.  is at the bedside. Safety measures in place, bed is low and locked, call bell in reach, tab alert in place, Side rails up x2. Door remains closed while  is at the bedside. 1530 Wound care completed per orders. Morphine 4mg SQ given. Pt is resting with here eyes closed. FLACC 0/10, safety measures in place.  currently is not at the bedside. 1730 patient breathing six quick breaths and then 30 seconds of apnea. FLACC 0/10, no signs or symptoms of distress, anxiety. Safety measures in place. Report given to [facility], RN.

## 2020-11-02 NOTE — PROGRESS NOTES
Progress Note    Patient: Melissa Belcher MRN: [id number]  SSN: [id number]    YOB: 1932  Age: 80 y.o. Sex: female      Admit Date: 10/21/2020    LOS: 12 days     Subjective:     Unresponsive. NPO. Has required Morphine 2mg x 4 SQ for pain, Morphine 4mg x 2 SQ with wound care and haldol x 2 for agitation. Review of Systems:  Review of systems not obtained due to patient factors. Objective:     Vitals:    10/29/20 1357 10/30/20 1729 10/31/20 1720 11/01/20 1533   BP:  127/60 133/62 (!) 95/53   Pulse: (!) 107 95 100 97   Resp: 28 22 20 18   Temp:  99 °F (37.2 °C) 98.6 °F (37 °C) 98.1 °F (36.7 °C)        Intake and Output:  Current Shift: No intake/output data recorded. Last three shifts: No intake/output data recorded. Physical Exam:   GENERAL: Unresponsive, appears older than stated age, pale, cachectic  LUNG: Coarse diminished breath sounds. Shallow and unlabored respirations. 10-15 second periods of apnea  HEART: regular rate and rhythm  ABDOMEN: soft, non-tender. Bowel sounds hypoactive. : Incontinent. EXTREMITIES:  extremities with no cyanosis or edema. + pulses bilaterally. Upper extremities contracted. SKIN: Pale. Warm to touch. 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 NEUROLOGIC: Unresponsive. Bedbound. Lab/Data Review:  No new labs resulted in the last 24 hours.     Assessment:     Principal Problem:    Alzheimer's dementia without behavioral disturbance ([facility] [address] (10/21/2020)    Active Problems:    Anorexia (8/27/2020)      Unintentional weight loss (8/27/2020)      Debility (8/27/2020)      Dehydration (8/27/2020)      Hypertension (8/27/2020)      Hospice care (9/9/2020)      Ataxia (10/23/2020)      History of hip fracture (10/23/2020)      Falls, sequela (10/23/2020)      Muscle injury (10/23/2020)        Plan:     Current Facility-Administered Medications   Medication Dose Route Frequency    fentaNYL (DURAGESIC) 50 mcg/hr patch 1 Patch  1 Patch TransDERmal Q72H    metroNIDAZOLE (FLAGYL) tablet 500 mg  500 mg Topical DAILY    And    miconazole (MICOTIN) 2 % cream   Topical DAILY    metroNIDAZOLE (FLAGYL) tablet 500 mg  500 mg Topical PRN    And    miconazole (MICOTIN) 2 % cream   Topical PRN    morphine injection 4 mg  4 mg SubCUTAneous Q20MIN PRN    haloperidol lactate (HALDOL) injection 2 mg  2 mg SubCUTAneous Q1H PRN    acetaminophen (TYLENOL) suppository 650 mg  650 mg Rectal Q3H PRN    bisacodyL (DULCOLAX) suppository 10 mg  10 mg Rectal PRN    haloperidol lactate (HALDOL) injection 2 mg  2 mg SubCUTAneous Q1H PRN    glycopyrrolate (ROBINUL) injection 0.2 mg  0.2 mg SubCUTAneous Q4H PRN    morphine injection 2 mg  2 mg SubCUTAneous Q20MIN PRN       10/21: ([facility] pt-[facility]ir[facility] AR[facility]UELL[facility]) Admitted GIP with Alzheimer's dementia without behavioral disturbance for management of pain, agitation and wound care.      1. Pain: Morphine 2mg IV/SQ Q20 minutes as needed. Fentanyl 50mcg patch in place.      2. Agitation: Haloperidol 2mg IV/SQ Q1 hour prn.     3. Wound Care: Turn and reposition Q2 hours and prn. Wound care as ordered for sacral wound. Air mattress in place for pressure reduction. Morphine 4mg IV/SQ prn dressing change.     4. Family/Pt Support: No family at bedside during exam. Medications and plan of care discussed with nursing staff. Will continue to monitor for symptoms and adjust medications as needed to maintain patient comfort. PPS 10%. Case discussed with Dr. Gene Solorzano. No changes in plan of care.       Signed By: Antonietta Ferrell NP     November 2, 2020

## 2020-11-02 NOTE — [facility] IDG MASTER NOTE
[facility]  Date: 11/02/20  Time: 1:43 PM    ___________________    Patient: Phi Colin  Coverage Information:     Payor: [place]     Plan: [place] MEDICARE PART A AND B     Subscriber ID: [id number]     Phone Number:   MRN: [id number]    Current Benefit Period: Benefit Period 1  Start Date: 8/31/2020  End Date: 11/28/2020        Hospice Attending Provider: [facility]rosc[facility] [address]  [address]  Phone: [phone]  Fax: [phone]    Level of Care: General Inpatient Care      ___________________    Diagnoses: There were no encounter diagnoses.     Current Medications:    Current Facility-Administered Medications:     fentaNYL (DURAGESIC) 50 mcg/hr patch 1 Patch, 1 Patch, TransDERmal, Q72H, Ju Hayes NP, 1 Patch at 10/30/20 1525    metroNIDAZOLE (FLAGYL) tablet 500 mg, 500 mg, Topical, DAILY, 500 mg at 11/01/20 1529 **AND** miconazole (MICOTIN) 2 % cream, , Topical, DAILY, Reshma Butts NP    metroNIDAZOLE (FLAGYL) tablet 500 mg, 500 mg, Topical, PRN, 500 mg at 10/31/20 0044 **AND** miconazole (MICOTIN) 2 % cream, , Topical, PRN, Ju Hayes NP    morphine injection 4 mg, 4 mg, SubCUTAneous, Q20MIN PRN, Lima Orosco MD, 4 mg at 11/01/20 1534    haloperidol lactate (HALDOL) injection 2 mg, 2 mg, SubCUTAneous, Q1H PRN **OR** [DISCONTINUED] haloperidol lactate (HALDOL) injection 2 mg, 2 mg, IntraVENous, Q1H PRN, Ju Hayes NP, 2 mg at 10/23/20 2049    acetaminophen (TYLENOL) suppository 650 mg, 650 mg, Rectal, Q3H PRN, Ju Hayes NP    bisacodyL (DULCOLAX) suppository 10 mg, 10 mg, Rectal, PRN, Ju [place], NP    haloperidol lactate (HALDOL) injection 2 mg, 2 mg, SubCUTAneous, Q1H PRN, 2 mg at 11/02/20 0735 **OR** [DISCONTINUED] haloperidol lactate (HALDOL) injection 2 mg, 2 mg, IntraVENous, Q1H PRN, Ju Hayes NP, 2 mg at 10/25/20 1720    glycopyrrolate (ROBINUL) injection 0.2 mg, 0.2 mg, SubCUTAneous, Q4H PRN, Merle Pollen, NP    morphine injection 2 mg, 2 mg, SubCUTAneous, Q20MIN PRN, 2 mg at 11/02/20 0735 **OR** [DISCONTINUED] morphine injection 2 mg, 2 mg, IntraVENous, Q20MIN PRN, Merle Pollen, NP, 2 mg at 10/25/20 1720    Orders:  Orders Placed This Encounter    IP CONSULT TO SPIRITUAL CARE Once on week one, then PRN. For [facility] patients only. For contracted patients, primary hospice will continue to manage spiritual care needs     Once on week one, then PRN. For [facility] patients only. For contracted patients, primary hospice will continue to manage spiritual care needs     Standing Status:   Standing     Number of Occurrences:   1     Order Specific Question:   Reason for Consult: Answer:   Spiritual crisis intervention or per patient or caregiver request    DIET PLEASURE     Standing Status:   Standing     Number of Occurrences:   1     Order Specific Question:   Likes/Dislikes/Preferences     Answer:   HOLD TRAY, thickened liquids    VITAL SIGNS     Standing Status:   Standing     Number of Occurrences:   1    NURSING-MISCELLANEOUS: Comfort Care Measures: Enter comfort measures above. CONTINUOUS     Enter comfort measures above. Standing Status:   Standing     Number of Occurrences:   1     Order Specific Question:   Description of Order:     Answer:   Comfort Care Measures:    JONES CATHETER, CARE     1. Jones Catheter care every shift and PRN  2. Notify Physician of Jones Catheter leakage, occlusion, gross adherent sediment or accidental removal  3. Change Jones 30 days after insertion. 4. May flush catheter prn leakage or gross adherent sediment or mucus.      Standing Status:   Standing     Number of Occurrences:   1    BLADDER CHECKS     May scan bladder PRN for urinary retention and or patient discomfort     Standing Status:   Standing     Number of Occurrences:   1    NURSING ASSESSMENT:  SPECIFY Assess for GIP, routine, or respite level of care. Q SHIFT Routine     Standing Status:   Standing     Number of Occurrences:   1     Order Specific Question:   Please describe the test or procedure you would like to order. Answer:   Assess for GIP, routine, or respite level of care.  PAIN ASSESSMENT Pain and Symptoms: Assess ever 4 hours and PRN, for GIP level of care. PRN Routine     Standing Status:   Standing     Number of Occurrences:   1     Order Specific Question:   Please describe the test or procedure you would like to order. Answer:   Pain and Symptoms: Assess ever 4 hours and PRN, for GIP level of care.  BEDREST, COMPLETE     Standing Status:   Standing     Number of Occurrences:   1    NURSING-MISCELLANEOUS: DME: Please order and place air mattress for pressure reduction. CONTINUOUS     Please order and place air mattress for pressure reduction. Standing Status:   Standing     Number of Occurrences:   1     Order Specific Question:   Description of Order:     Answer:   DME:    NURSING-MISCELLANEOUS: admit 10/21: [facility]e ARGUELLES) Admitted GIP with Alzheimer's dementia without behavioral disturbance for management of pain, agitation and wound care. Other Hospice diagnoses: Ataxia (R27.0) History of hip fracture (P36. ...     10/21: [facility] Tra[facility] ARGUELLES) Admitted GIP with Alzheimer's dementia without behavioral disturbance for management of pain, agitation and wound care.    Other Hospice diagnoses:     Ataxia (R27.0)     History of hip fracture (Z87.81)     Fall, sequela (W19.XXXS)     Muscle injury (T14.90XA)     Debility (R53.81)     Hypertension, essential (I10)     Benefit Period 1  Start Date: 8/31/2020  End Date: 72/02/6986     I certify Maxine Devi has a prognosis for a life expectancy of 6 months or less if the terminal illness runs its normal course.     As evidenced by an 42-year-old woman with precipitous acceleration of debility in Alzheimer's disease directly related to right gluteus medius, iliopsoas and hamstring tendon injury with hemorrhage which appears to be subacute. E. coli urinary tract infection with sepsis requiring hospitalization 8/24/2020 is another related diagnoses contributing to adverse prognosis for this octogenarian. She has declined to requiring assist in transfers and ambulation, intermittent fecal and urinary incontinence, disorientation in place, time and intention. This is F AST 6 level functional compromise. In the setting of other life-threatening musculoskeletal and infectious complications this woman's life expectancy with care limited to comfort measures is less than 6 months. Her  has chosen to seek to avoid recurrent hospitalizations and to limit further care to comfort measures provided at home through the auspices of [facility]. Blood loss anemia, hypertension, and anorexia are further related problems contributing to poor prognosis        Standing Status:   Standing     Number of Occurrences:   1     Order Specific Question:   Description of Order:     Answer:   admit    WOUND CARE, DRESSING CHANGE     Wound Care:  Location: sacrum  Decubitus Wounds Stage IV - Cleanse wound location with wound cleanser, pat dry. Apply metronidazole 500 mg crushed daily mixed with 30 mL anti-fungal cream applied to wound bed. Apply gauze, calcium alginate dressing, abd pad and secure with tape. Place chux pad under wound. Change daily  and PRN if soiled or not secure. Turn every 2 hours. Standing Status:   Standing     Number of Occurrences:   1    WOUND CARE, DRESSING CHANGE     Wound Care:  Location: posterior neck   Decubitus Wounds Stage II - Cleanse wound location with wound cleanser, pat dry and apply foam dressing covering wound. Change every 3 days and PRN if soiled or not secure. Turn every 2 hours.      Standing Status:   Standing     Number of Occurrences:   5    DO NOT RESUSCITATE     Standing Status:   Standing     Number of Occurrences:   1    OXYGEN CANNULA Liters per minute: 2; Indications for O2 therapy: RESPIRATORY DISTRESS PRN Routine     Standing Status:   Standing     Number of Occurrences:   1     Order Specific Question:   Liters per minute: Answer:   2     Order Specific Question:   Indications for O2 therapy     Answer:   RESPIRATORY DISTRESS    haloperidol lactate (HALDOL) injection 2 mg    DISCONTD: haloperidol lactate (HALDOL) injection 2 mg    acetaminophen (TYLENOL) suppository 650 mg    bisacodyL (DULCOLAX) suppository 10 mg    haloperidol lactate (HALDOL) injection 2 mg    DISCONTD: haloperidol lactate (HALDOL) injection 2 mg    glycopyrrolate (ROBINUL) injection 0.2 mg    morphine injection 2 mg    DISCONTD: morphine injection 2 mg    DISCONTD: fentaNYL (DURAGESIC) 12 mcg/hr patch 1 Patch    DISCONTD: sodium chloride (NS) flush 3 mL    DISCONTD: sodium chloride (NS) flush 3 mL    DISCONTD: metroNIDAZOLE (FLAGYL) tablet 500 mg     Order Specific Question:   Antibiotic Indications     Answer: Other     Order Specific Question:   Other Abx Indication     Answer:   wound care    DISCONTD: miconazole (MICOTIN) 2 % cream    DISCONTD: morphine injection 4 mg    DISCONTD: metroNIDAZOLE (FLAGYL) tablet 500 mg     Order Specific Question:   Antibiotic Indications     Answer: Other     Order Specific Question:   Other Abx Indication     Answer:   wound care     Order Specific Question:   Suspected Organism(s)     Answer:   anaerobes in necrosis    DISCONTD: miconazole (MICOTIN) 2 % cream    morphine injection 4 mg    DISCONTD: metroNIDAZOLE (FLAGYL) tablet 500 mg     Order Specific Question:   Antibiotic Indications     Answer:    Other     Order Specific Question:   Other Abx Indication     Answer:   wound care     Order Specific Question:   Suspected Organism(s)     Answer:   anaerobes in necrosis    DISCONTD: miconazole (MICOTIN) 2 % cream    AND Linked Order Group     metroNIDAZOLE (FLAGYL) tablet 500 mg      Order Specific Question: Antibiotic Indications      Answer: Other      Order Specific Question:   Other Abx Indication      Answer:   wound care      Order Specific Question:   Suspected Organism(s)      Answer:   anaerobes in necrosis     miconazole (MICOTIN) 2 % cream    AND Linked Order Group     metroNIDAZOLE (FLAGYL) tablet 500 mg      Order Specific Question:   Antibiotic Indications      Answer: Other      Order Specific Question:   Other Abx Indication      Answer:   wound care     miconazole (MICOTIN) 2 % cream    DISCONTD: fentaNYL (DURAGESIC) 25 mcg/hr patch 1 Patch    fentaNYL (DURAGESIC) 50 mcg/hr patch 1 Patch    INITIAL PHYSICIAN ORDER: HOSPICE Level Of Care: General Inpatient; Reason for Admission: 10/21: [facility]e ARGUELLES) Admitted GIP with Alzheimer's dementia without behavioral disturbance for management of pain, agitation and wound care. Standing Status:   Standing     Number of Occurrences:   1     Order Specific Question:   Status     Answer:   Hospice     Order Specific Question:   Level Of Care     Answer:   General Inpatient     Order Specific Question:   Reason for Admission     Answer:   10/21: [facility] [place] ARGUELLES) Admitted GIP with Alzheimer's dementia without behavioral disturbance for management of pain, agitation and wound care. Order Specific Question:   Inpatient Hospitalization Certified Necessary for the Following Reasons     Answer:   3.  Patient receiving treatment that can only be provided in an inpatient setting (further clarification in H&P documentation)     Order Specific Question:   Admitting Diagnosis     Answer:   Alzheimer's dementia without behavioral disturbance [facility]) [[id number]]     Order Specific Question:   Terminal Prognosis Diagnosis(es)     Answer:   Alzheimer's dementia without behavioral disturbance [facility]) [[id number]]     Order Specific Question:   Admitting Physician     Answer:   Grace Villanueva     Order Specific Question:   Attending Physician     Answer: Jakob Early [[id number]]     Order Specific Question:   Discharge Plan:     Answer: Other (Specify)    IP CONSULT TO [address] [place] [address] Street patients only. For contracted patients, primary hospice will continue to manage social work needs     Standing Status:   Standing     Number of Occurrences:   1     Order Specific Question:   Reason for Consult: Answer: Once on week one, then PRN for Psychosocial crisis intervention or per patient or caregiver request.       Allergies: Allergies   Allergen Reactions    Niacin Unknown (comments)    Tolectin [Tolmetin] Unknown (comments)    Naproxen Other (comments)     GI IRRITATION       Care Plan:  Multidisciplinary Problems (Active)     Problem: Anticipatory Grief     Dates: Start: 10/22/20       Disciplines: Interdisciplinary    Goal: Grief heard and acknowledged, anxiety reduced, patient coping identified, patient/family expressed gratitude     Dates: Start: 10/22/20   Expected End: 11/06/20       Description: Patient/ Family will acknowledge feelings of grief and anxiety and utilize available support including education on anticipatory grief. Disciplines: Interdisciplinary    Intervention: Assess grief responses     Dates: Start: 10/22/20       Description: Arabella Barron will assess grief reactions with each visit. Intervention: Support grieving process     Dates: Start: 10/22/20       Description: Arabella Barron will support the grief process by providing opportunity for open communication and offering education on anticipatory grief. Problem: Anxiety/Agitation     Dates: Start: 10/21/20       Disciplines: Interdisciplinary    Goal: Verbalize and demonstrate ability to manage anxiety     Dates: Start: 10/21/20   Expected End: 11/03/20       Description: The patient/family/caregiver will verbalize and demonstrate ability to manage the patient's anxiety throughout hospice care.     Disciplines: Interdisciplinary Intervention: Assess for anxiety/agitation     Dates: Start: 10/21/20       Description: Assess for signs and symptoms of anxiety and agitation. Intervention: Instruction strategies to reduce anxiety/agitation     Dates: Start: 10/21/20       Description: Instruct patient/caregiver on strategies to reduce anxiety/agitation. Problem: Falls - Risk of     Dates: Start: 10/21/20       Disciplines: Interdisciplinary    Goal: *Absence of Falls     Dates: Start: 10/21/20   Expected End: 11/03/20       Description: Document Julian Dodge Fall Risk and appropriate interventions in the flowsheet. Disciplines: Interdisciplinary                Problem: General Wound Care     Dates: Start: 10/31/20       Disciplines: Interdisciplinary    Goal: Interventions     Dates: Start: 10/31/20   Expected End: 11/07/20       Description: Patient's pressure injuries will not worsen during her stay at [facility] as evidenced by stable measurements and assessment of wound bed/drainage.     Disciplines: Interdisciplinary    Intervention: Pain management     Dates: Start: 10/31/20             Intervention: Wound assessment, including wound bed description and dimensions     Dates: Start: 10/31/20             Intervention: Wound dressing change, sterile or clean     Dates: Start: 10/31/20             Intervention: Wound care (eg: Remove necrotic tissue; infection control/protection; absorb exudate; fill dead space; maintain moisture; maintain constant temperature of wound)     Dates: Start: 10/31/20             Intervention: Skin assessment     Dates: Start: 10/31/20             Intervention: Skin monitoring and care (e.g. skin cleansing; keep dry, moisturize, utilization of  lotion and/or skin barrier cream)     Dates: Start: 10/31/20             Intervention: Incontinence management     Dates: Start: 10/31/20                         Problem: Pain     Dates: Start: 10/21/20       Disciplines: Nurse, Interdisciplinary, RT    Goal: *Control of Pain     Dates: Start: 10/21/20   Expected End: 11/03/20       Description: Derwood Castleman will have pain control AEB a FLACC score of 2 or less.       Disciplines: Nurse, Interdisciplinary, RT    Intervention: Assess pain characteristics (eg: Intensity scale; onset; location; quality; severity; duration; frequency; radiation)     Dates: Start: 10/21/20             Intervention: Assess pain management - barriers (eg: Past pain experiences)     Dates: Start: 10/21/20             Intervention: Identify pain expectations (eg: Patient's pain goal; somatic experiences; behavioral changes; affect)     Dates: Start: 10/21/20             Intervention: Identify pain medication concerns (eg: Cultural considerations; addiction concerns)     Dates: Start: 10/21/20             Intervention: Support system identification (eg: Caregiver; community resource; family; friends; Spiritism; support group)     Dates: Start: 10/21/20             Intervention: Monitor for change in patient condition (eg:  Vital signs changes; changes in level of consciousness; nausea; behavioral changes)     Dates: Start: 10/21/20             Intervention: Medication side-effect assessment     Dates: Start: 10/21/20             Intervention: Pain-relief response reassessment (eg: Frequency based on route of administration; effectiveness)     Dates: Start: 10/21/20                         Problem: Patient Education: Go to Patient Education Activity     Dates: Start: 10/21/20       Disciplines: Interdisciplinary    Goal: Patient/Family Education     Dates: Start: 10/21/20       Disciplines: Interdisciplinary                Problem: Patient Education: Go to Patient Education Activity     Dates: Start: 10/21/20       Disciplines: Interdisciplinary    Goal: Patient/Family Education     Dates: Start: 10/21/20       Disciplines: Interdisciplinary                Problem: Pressure Injury - Risk of     Dates: Start: 10/21/20 Disciplines: Interdisciplinary    Goal: *Prevention of pressure injury     Dates: Start: 10/21/20   Expected End: 11/03/20       Description: Document Yeison Scale and appropriate interventions in the flowsheet.     Disciplines: Interdisciplinary                  Care Plan Problems/Goals      Progressing Towards Goal (8)      *Prevention of pressure injury (Pressure Injury - Risk of)    Disciplines:  Interdisciplinary Expected end:  11/03/20        Outcome: Progressing Towards Goal By Amberly Soares RN on 10/31/20 1924            Patient/Family Education (Patient Education: Go to Patient Education Activity)    Disciplines:  Interdisciplinary Expected end:  -        Outcome: Progressing Towards Goal By Valerie Ervin on 10/26/20 0333            *Absence of Falls (Falls - Risk of)    Disciplines:  Interdisciplinary Expected end:  11/03/20        Outcome: Progressing Towards Goal By Amberly Soares RN on 10/31/20 1924            Patient/Family Education (Patient Education: Go to Patient Education Activity)    Disciplines:  Interdisciplinary Expected end:  -        Outcome: Progressing Towards Goal By Valerie Ervin on 10/26/20 0217            *Control of Pain (Pain)    Disciplines:  Nurse, Interdisciplinary, RT Expected end:  11/03/20        Outcome: Progressing Towards Goal By Jc Joseph RN on 11/02/20 5607            Verbalize and demonstrate ability to manage anxiety (Anxiety/Agitation)    Disciplines:  Interdisciplinary Expected end:  11/03/20        Outcome: Progressing Towards Goal By Amberly Soares RN on 10/31/20 1924            Grief heard and acknowledged, anxiety reduced, patient coping identified, patient/family expressed gratitude (Anticipatory Grief)    Disciplines:  Interdisciplinary Expected end:  11/06/20        Outcome: Progressing Towards Goal By Valerie Ervin on 10/26/20 0333            Interventions (General Wound Care)    Disciplines:  Interdisciplinary Expected end: 11/07/20        Outcome: Progressing Towards Goal By Sammi Kearns RN on 10/31/20 0217                              ___________________    Care Team Notes          POC/IDG Notes      [facility] IDG Nurse Notes by Oracio Rosa RN at 11/02/20 1331  Version 1 of 1    Author:  Oracio Rosa RN Service:  Hospice and Palliative Care Author Type:  Registered Nurse    Filed:  11/02/20 1331 Date of Service:  11/02/20 1331 Status:  Signed    :  Oracio Rosa RN (Registered Nurse)       Patient: Jonna Mar    Date: 11/02/20  Time: 1:31 PM    [facility] Nurse Notes  F/U IDG: Pt is an 59-year-old female with Alzheimers who is here GIP level of care for management of pain, agitation and wound management. Responds to painful stimuli. Moaning at times. Incontinent with briefs. Wet x 1 in past 24 hours. No IV access. PO intake: NPO. Wounds: stage IV sacrum with flagyl and anti-fungal cream with calcium alginate dressings daily and PRN if soiled and stage II posterior neck with foam dressing. Pt is on air mattress for comfort. PRN medications: Haldol 2 mg SQ x 2 for agitation, morphine 2 mg IV x 1 for pain, morphine 4 mg SQ x 2 prior to wound care. Scheduled meds:  Fentanyl 50 mcg TD patch (increased on 10/30). Plan: No changes today. Comprehensive plan of care reviewed. IDG and pt./family in agreement with plan of care. The IDG identifies through on-going assessment when a change is needed to the POC; the pt/family will receive care and services necessitated by changes in POC. Medications reviewed by the pharmacist and Medical Director.           Signed by: Prachi Dickerson RN       [facility] IDG  Notes by Rodrigo Corbin LMSW at 11/02/20 1328  Version 1 of 1    Author:  Rodrigo Corbin LMSW Service:  Licensed Clinical  Author Type:  Licensed Masters in Social Work    Filed:  11/02/20 1297 Date of Service:  11/02/20 1328 Status:  Signed    :  Rodrigo Corbin LMSW (Licensed Masters in Social Work)       Emotional support; Progressing toward goals AEB pt/family demonstrates adequate coping and verbalizes emotional needs are being addressed       [facility] IDG  Notes by Mario Nichols at 11/02/20 1142  Version 1 of 1    Author:  Mario Nichols Service:  Spiritual Care Author Type:  Pastoral Care    Filed:  11/02/20 1240 Date of Service:  11/02/20 1142 Status:  Signed    :  Mario Nichols (Pastoral Care)       Patient: Jonna Mar    Date: 11/02/20  Time: 11:42 AM    [facility]  Notes    Intervention: Ministry of presence, scripture, prayer, family care. Outcome: Patient is non responsive. .   was pleasant and appears to be coping appropriately. Plan: Continue to provide spiritual and emotional support for patient and family. Signed by: Huma Nguyễn       [facility]  Notes by Mario Nichols at 10/29/20 1243  Version 1 of 1    Author:  Mario Nichols Service:  Spiritual Care Author Type:  Pastoral Care    Filed:  10/29/20 1244 Date of Service:  10/29/20 1243 Status:  Signed    :  Mario Nichols ([facility] [[email or web address]oral Care)       Patient: Jonna Mar    Date: 10/29/20  Time: 12:43 PM    [facility]  Notes  Intervention: Ministry of presence, family care, prayer and scripture. Outcome:  expressed appreciation for the care of his wife, patient appeared to be obtunded. Plan: continue to provide spiritual and emotional support.          Signed by: Huma Nguyễn       [facility] IDG Nurse Notes by Oracio Rosa RN at 10/29/20 1206  Version 1 of 1    Author:  Oracio Rosa RN Service:  Hospice and Palliative Care Author Type:  Registered Nurse    Filed:  10/29/20 1206 Date of Service:  10/29/20 1206 Status:  Signed    :  Oracio Rosa RN (Registered Nurse)       Patient: Jonna Mar    Date: 10/29/20  Time: 12:06 PM    [facility] Nurse Notes  F/U IDG: Pt is an 80-year-old female with Alzheimers who is here GIP level of care from [facility] in home program for management of pain and complicated wound care and agitation. Afebrile. Responds only to painful stimuli. Pt is incontinent in briefs x 2. No IV access. PO intake: none x 8 days. Wounds: stage IV wound to sacrum with daily dressing changes, stage II to back of neck with foam dressing. Pt is on air mattress for comfort. PRN medications: Haldol 2 mg SQ x 2 for agitation, morphine 2 mg SQ x 2 for pain. Scheduled meds:  Fentanyl 25 mcg patch (increased 10/28). Plan: Discontinue scheduled vital signs. Comprehensive plan of care reviewed. IDG and pt./family in agreement with plan of care. The IDG identifies through on-going assessment when a change is needed to the POC; the pt/family will receive care and services necessitated by changes in POC. Medications reviewed by the pharmacist and Medical Director. Signed by: Sushila Patterson RN       [facility]  Notes by Sherren Penton, LMSW at 10/29/20 1134  Version 1 of 1    Author:  Sherren Penton, LMSW Service:  Licensed Clinical  Author Type:      Filed:  10/29/20 1134 Date of Service:  10/29/20 1134 Status:  Signed    :  Sherren Penton, LMSW ()       SW to assess coping and needs each visit and offer availability. [facility]  Notes by Bushra Maza LMSW at 10/26/20 1756  Version 1 of 1    Author:  Bushra Maza LMSW Service:  Licensed Clinical  Author Type:  Licensed Masters in Social Work    [address] [place] [address] Way:  10/26/20 [address] [place] [address] Date of Service:  10/26/20 1756 Status:  Signed    :  Bushra Maza LMSW (Licensed Masters in Social Work)       Pt/family are receiving emotional support AEB pt/family verbalize emotional needs are addressed each visit.        [facility] IDG Nurse Notes by Mary Conley RN at 10/26/20 1250  Version 1 of 1    Author:  Mary Conley RN Service:  Hospice and Palliative Care Author Type:  Registered Nurse    Filed:  10/26/20 4644 Date of Service:  10/26/20 1258 Status:  Signed    :  Jennifer Cornelius RN (Registered Nurse)       Patient: Bang Mancini    Date: 10/26/20  Time: 12:58 PM    \[facility] [place] [facility]\"" Nurse Notes  F/U IDG: Pt is an 61-year-old female with Alzheimers who is here GIP level of care from [facility] in home program for management of wounds, pain and agitation. Temp 100.5 ax yesterday but is back to normal range today. Macias with UOP of 150 cc. IV access. PO intake: none. 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 Pt is requiring frequent dressing changes. Pt is on air mattress for comfort. PRN medications: Haldol 2 mg SQ x 3 for agitation, morphine 2 mg SQ x 3 for pain. Scheduled meds:  Fentanyl 12 mcg patch, antifungal with dressing changes. Plan: Dressing changes q 12 hours to debride wound. Add calcium alginate to wound care orders. Comprehensive plan of care reviewed. IDG and pt./family in agreement with plan of care. The IDG identifies through on-going assessment when a change is needed to the POC; the pt/family will receive care and services necessitated by changes in POC. Medications reviewed by the pharmacist and Medical Director. Signed by: Luis Gaffney RN       \[facility] [place] [facility]\"" IDG Other Notes by Yusef Camacho at 10/26/20 1148  Version 1 of 1    Author:  Yusef Camacho Service:  Spiritual Care Author Type:  Pastoral Care    Filed:  10/26/20 1149 Date of Service:  10/26/20 1148 Status:  Signed    :  Yusef Camacho (Pastoral Care)          MSW continues to be available for support as needed, requested or referred.         [facility] IDG  Notes by Yusef Camacho at 10/26/20 1146  Version 1 of 1    Author:  Yusef Camacho Service:  Spiritual Care Author Type:  Pastoral Care    Filed:  10/26/20 1148 Date of Service:  10/26/20 1146 Status:  Signed    :  Yusef Camacho (Pastoral Care)       Patient: Bang Mancini    Date: 10/26/20  Time: 11:46 AM    \[facility] [place] [facility]\""  Notes    Intervention: Ministry of presence, prayer, conversation with >    Outcome: Patient appeared to be sleeping soundly. Met . He has difficulty hearing. He was pleasant and appreciative of all the care his wife is receiving. Plan: Continue to provide spiritual and emotional support throughtout patience's time at [facility]. Signed by: Rohit Watson       [facility] IDG Nurse Notes by Cally Wick RN at 10/23/20 1413  Version 1 of 1    Author:  Cally Wick RN Service:  Hospice and Palliative Care Author Type:  Registered Nurse    Filed:  10/23/20 1414 Date of Service:  10/23/20 1413 Status:  Signed    :  Cally Wick RN (Registered Nurse)       Patient: Calin Flores    Date: 10/23/20  Time: 2:13 PM    [facility] [place] [facility]  1st IDG: Pt is an 66-year-old female with Alzheimers disease who is here GIP level of care from [facility] in home program for management of pain, agitation and wound care. Temp 100.3 ax. Macias with UOP of 25 cc also leaking. PO intake: none. IV access in right wrist.  Wounds: stage III on sacrum. PRN medications: Haldol 2 mg SQ/IV x 7 for agitation, morphine 2 mg IV/SQ x 8 for pain. Scheduled meds:  fentanyl 12 mcg patch added yesterday. Plan: Add antifungal cream/flagyl for wound care with gauze/ABD pad q 48 hours. Comprehensive plan of care reviewed. IDG and pt./family in agreement with plan of care. The IDG identifies through on-going assessment when a change is needed to the POC; the pt/family will receive care and services necessitated by changes in POC. Medications reviewed by the pharmacist and Medical Director.         Signed by: Mars Soares RN       [facility] IDG  Notes by Haroldo Maradiaga at 10/23/20 1236  Version 1 of 1    Author:  Haroldo Maradiaga Service:  Spiritual Care Author Type:  Pastoral Care    Filed:  10/23/20 1242 Date of Service:  10/23/20 1236 Status:  Signed    :  Haroldo Maradiaga (Pastoral Care)       Patient: Viridiana Jalloh [place]    Date: 10/23/20  Time: 12:36 PM    [facility]  Notes    Intervention: Ministry of presence. Prayer and completion of assessments. Outcome; Patient resting with eyes closed. Plan: Continue to provide spiritual and emotional support. Signed by: Serafin Adame       [address] [facility]  Notes by Santa Douglas LMSW at 10/23/20 1049  Version 1 of 1    Author:  Santa Douglas LMSW Service:  Licensed Clinical  Author Type:      Filed:  10/23/20 1050 Date of Service:  10/23/20 1049 Status:  Signed    :  Santa Douglas LMSW ()       SW to assess coping and needs each visit and offer availability. [address] [place] [address]  Notes by Patito Higgins at 10/22/20 0946  Version 1 of 1    Author:  Patito Higgins Service:  Spiritual Care Author Type:  Pastoral Care    Filed:  10/22/20 0947 Date of Service:  10/22/20 0946 Status:  Signed    :  Patito Higgins (Pastoral Care)       Patient: Willis Persaud    Date: 10/22/20  Time: 9:46 AM    [facility]  Notes    / Grief Counselor has reviewed  Initial Comprehensive Assessment and plan of care. Bereavement and Spiritual Care Assessments to be completed and plan of care put in place to meet the needs, requests and referrals. Signed by: Serafin Adame       [address] [facility] Nurse Notes by Kylie Pimentel RN at 10/21/20 2140  Version 1 of 1    Author:  Kylie Pimentel RN Service:  -- Author Type:  Registered Nurse    Filed:  10/21/20 2222 Date of Service:  10/21/20 2140 Status:  Signed    :  Kylie Pimentel RN (Registered Nurse)       Patient: Willis Persaud    Date: 10/21/20  Time: 9:40 PM    [address] Nurse Notes    Patient is an 80-year-old female admitted from the in-home program for GIP level of care with a hospice diagnosis of Alzheimer's for management of pain, agitation, and wound care.   The patient has had an acceleration of her Alzheimer's debility related to her right gluteus medius, iliopsoas, and hamstring tendon injury. The patient has had more fecal and urinary incontinence and more disorientation. The patient's Stage III wound has also made care for her at home more complex. The patient has come to [facility] for wound care and to better manage the patient's pain and agitation with injectable medications. On admission, patient was noted to be grimacing and moaning. FLACC 5/10. Patient was medicated with PRN Morphine 2 mg SQ for pain and PRN Haldol 2 mg SQ for agitation. On admission, the patient's miles catheter was noted to be leaking. New miles catheter was placed via sterile technique with CNA assist.  Allevyn to sacrum was removed and a wet to moist dressing was applied to the sacrum per orders. Patient tolerated the procedure fair. Patient has no IV access. Patient was alert but was nonverbal with this nurse. Bilateral hands were noted to be contracted. Patient on air mattress for pressure relief. No family was here at time of admission. Medications found in patient suitcase and locked up per facility policy. Admission complete and hospice care plan initiated which includes spiritual, psychosocial, and bereavement. No initial needs identified. IDG team, including MD, made aware of care plans. Volunteer services suspended due to COVID-19.       Signed by: Chloe Oakley RN                Care Team Present:   Team Members Present: (see sign in sheet for attendees)

## 2020-11-02 NOTE — PROGRESS NOTES
0146- Report received, patient resting quietly in bed with no s/sx pain or distress. Eyes closed. Patient pale and breathing very shallow. Fentanyl patch intact to L chest. Bed is low and locked, tab alert in place, and door left open for continuous monitoring. 9539- patient grimacing and whining. Eyes opening and blinking. Appears to be somewhat alert. Prn morphine given for pain and prn haldol given for anxiety/agitation. flacc 8/10    0800- Patient resting with eyes closed. No s/sx of pain or distress. flacc 0/10.    1005- Patient resting with eyes closed. No s/sx of pain or distress. 1234- Patient resting with eyes closed. No s/sx of pain or distress. Pt having cheyne prado patterned breathing with periods of 10-15 sec apnea. 1407- fentanyl patch 50 mcg removed and wasted with BLANE Arceo/ scheduled fentanyl patch 50 mcg applied to back of L arm.  at bedside. 1504- prn morphine and haldol given for pain and agitation prior to wound care. 26-  leaving at this time. wound care provided per orders. Incontinent urine x 1. Patient had some reddish tan discharge. Moderate amount tan/reddish drainage. Marylou care provided and patient turned to L side. Prn morphine given for pain post wound care. flacc 7/10. Patient fairly tolerated. dsg to neck is c/d/i.    1630- flacc 0/10. Patient continuing to have cheyne prado breathing with periods of apnea. 0680- Patient resting with eyes closed. No s/sx of pain or distress.     Report given to [place], [address] [facility]

## 2020-11-02 NOTE — PROGRESS NOTES
Problem: Pressure Injury - Risk of  Goal: *Prevention of pressure injury  Description: the patient will not develop other pressure areas during stay. Outcome: Progressing Towards Goal  Note: Pressure Injury Interventions:  Sensory Interventions: Assess changes in LOC, Avoid rigorous massage over bony prominences, Check visual cues for pain, Float heels, Minimize linen layers    Moisture Interventions: Absorbent underpads, Apply protective barrier, creams and emollients, Limit adult briefs, Minimize layers, Moisture barrier    Activity Interventions: Assess need for specialty bed, Pressure redistribution bed/mattress(bed type)    Mobility Interventions: Assess need for specialty bed, Float heels, HOB 30 degrees or less    Nutrition Interventions: Document food/fluid/supplement intake    Friction and Shear Interventions: Apply protective barrier, creams and emollients, Lift sheet, Minimize layers                Problem: Pain  Goal: *Control of Pain  Description: María Eugene will have pain control AEB a FLACC score of 2 or less. Fentanyl patch scheduled 50 mch  Morphine 2 mg/ 4mg ( wound care) q 20 min prn. Outcome: Progressing Towards Goal     Problem: Anxiety/Agitation  Goal: Verbalize and demonstrate ability to manage anxiety  Description: the patient's anxiety/agitation will be controlled during shift and patient will have relaxed body features aeb no picking at clothing, groaning, or restlessness.   Haldol 2 mg q 1 hr prn    Outcome: Progressing Towards Goal

## 2020-11-02 NOTE — PROGRESS NOTES
1845 - Report received from Mayra Ferrell RN.  Patient identified.  Patient GIP level of care with hospice diagnosis of alzheimer's dementia w/o behavioral disturbance.  Patient in bed, eyes closed.  No s/sx anxiety, agitation, NVD or respiratory distress.  FLACC 0/10.  Patient having approximately 30 seconds of apnea. Fent 50mcg to L chest verified.  Bed in lowest, locked position, call light within reach, tab alert on, and SR up for safety. 2035 - Assessment complete (see flowsheets). Patient in bed, eyes closed.  No s/sx anxiety, agitation, NVD or respiratory distress.  FLACC 0/10. Patient still noted to be apneic. 2240 - Patient in bed, eyes closed.  No s/sx anxiety, agitation, NVD or respiratory distress.  FLACC 0/10.     0036 - Patient in bed, eyes closed.  No s/sx anxiety, agitation, NVD or respiratory distress.  FLACC 0/10.    0240 - Patient in bed, eyes closed.  No s/sx anxiety, agitation, NVD or respiratory distress.  FLACC 0/10.    0440 - Dressing to sacrum checked. No breakthrough drainage. 8469 - Patient in bed, eyes closed.  No s/sx anxiety, agitation, NVD or respiratory distress.  FLACC 0/10.     Report given to Devyn Camejo RN

## 2020-11-02 NOTE — [facility] NURSE NOTES
Patient: Heather Bey    Date: 11/02/20  Time: 1:31 PM    [facility] Nurse Notes  F/U IDG: Pt is an 80-year-old female with Alzheimers who is here GIP level of care for management of pain, agitation and wound management. Responds to painful stimuli. Moaning at times. Incontinent with briefs. Wet x 1 in past 24 hours. No IV access. PO intake: NPO. Wounds: stage IV sacrum with flagyl and anti-fungal cream with calcium alginate dressings daily and PRN if soiled and stage II posterior neck with foam dressing. Pt is on air mattress for comfort. PRN medications: Haldol 2 mg SQ x 2 for agitation, morphine 2 mg IV x 1 for pain, morphine 4 mg SQ x 2 prior to wound care. Scheduled meds:  Fentanyl 50 mcg TD patch (increased on 10/30). Plan: No changes today. Comprehensive plan of care reviewed. IDG and pt./family in agreement with plan of care. The IDG identifies through on-going assessment when a change is needed to the POC; the pt/family will receive care and services necessitated by changes in POC. Medications reviewed by the pharmacist and Medical Director.           Signed by: Cherelle Yañez RN

## 2020-11-02 NOTE — [facility] IDG SOCIAL WORKER NOTES
Emotional support; Progressing toward goals AEB pt/family demonstrates adequate coping and verbalizes emotional needs are being addressed

## 2020-11-02 NOTE — PROGRESS NOTES
Problem: Pain  Goal: *Control of Pain  Description: Ricci Cowden will have pain control AEB a FLACC score of 2 or less.     Outcome: Progressing Towards Goal

## 2020-11-02 NOTE — [facility] IDG CHAPLAIN NOTES
Patient: Tung Seth    Date: 11/02/20  Time: 11:42 AM    [facility]  Notes    Intervention: Ministry of presence, scripture, prayer, family care. Outcome: Patient is non responsive. .   was pleasant and appears to be coping appropriately. Plan: Continue to provide spiritual and emotional support for patient and family.           Signed by: Moises Dior

## 2020-11-03 NOTE — PROGRESS NOTES
Problem: Pressure Injury - Risk of  Goal: *Prevention of pressure injury  Description: Document Yeison Scale and appropriate interventions in the flowsheet. Outcome: Progressing Towards Goal  Note: Pressure Injury Interventions:  Sensory Interventions: Assess changes in LOC, Keep linens dry and wrinkle-free, Minimize linen layers, Pressure redistribution bed/mattress (bed type), Float heels    Moisture Interventions: Absorbent underpads, Maintain skin hydration (lotion/cream), Minimize layers    Activity Interventions: Assess need for specialty bed, Pressure redistribution bed/mattress(bed type), Increase time out of bed    Mobility Interventions: Float heels, HOB 30 degrees or less, Pressure redistribution bed/mattress (bed type)    Nutrition Interventions: Document food/fluid/supplement intake    Friction and Shear Interventions: Apply protective barrier, creams and emollients, HOB 30 degrees or less, Minimize layers                Problem: Patient Education: Go to Patient Education Activity  Goal: Patient/Family Education  Outcome: Progressing Towards Goal     Problem: Falls - Risk of  Goal: *Absence of Falls  Description: Document Soraya Fall Risk and appropriate interventions in the flowsheet. Outcome: Progressing Towards Goal  Note: Fall Risk Interventions:       Mentation Interventions: Adequate sleep, hydration, pain control, Bed/chair exit alarm, Door open when patient unattended    Medication Interventions: Bed/chair exit alarm    Elimination Interventions: Bed/chair exit alarm, Call light in reach              Problem: Patient Education: Go to Patient Education Activity  Goal: Patient/Family Education  Outcome: Progressing Towards Goal     Problem: Pain  Goal: *Control of Pain  Description: Brianda Lo will have pain control AEB a FLACC score of 2 or less. Outcome: Progressing Towards Goal     Problem: Anxiety/Agitation  Goal: Verbalize and demonstrate ability to manage anxiety  Description:  The patient/family/caregiver will verbalize and demonstrate ability to manage the patient's anxiety throughout hospice care. Outcome: Progressing Towards Goal     Problem: Anticipatory Grief  Goal: Grief heard and acknowledged, anxiety reduced, patient coping identified, patient/family expressed gratitude  Description: Patient/ Family will acknowledge feelings of grief and anxiety and utilize available support including education on anticipatory grief. Outcome: Progressing Towards Goal     Problem: General Wound Care  Goal: Interventions  Description: Patient's pressure injuries will not worsen during her stay at [facility] as evidenced by stable measurements and assessment of wound bed/drainage.   Outcome: Progressing Towards Goal

## 2020-11-03 NOTE — PROGRESS NOTES
Ms. Pao Bloom is lying quietly in her bed. No signs of distress. The lights are low. No family present.

## 2020-11-03 NOTE — PROGRESS NOTES
1845 - Report received Rocael Pitts identified.  Patient GIP level of care with hospice diagnosis of alzheimer's dementia w/o behavioral disturbance.  Patient in bed, eyes closed.  No s/sx anxiety, agitation, NVD or respiratory distress.  FLACC 0/10.  Patient having apnea. Fent 50mcg to L chest verified.  Bed in lowest, locked position, call light within reach, tab alert on, and SR up for safety. 0830 - Patient in bed, eyes closed.  No s/sx anxiety, agitation, NVD or respiratory distress.  FLACC 0/10. Patient has very shallow breathing and periods of apnea. 1030 - Patient in bed, eyes closed.  No s/sx anxiety, agitation, NVD or respiratory distress.  FLACC 0/10. Patient continues to have very shallow breathing and periods of apnea. 1200 - Patient in bed, eyes closed.  No s/sx anxiety, agitation, NVD or respiratory distress.  FLACC 0/10. Patient continues to have very shallow breathing and periods of apnea. [id number] - Dressing checked and CDI. Foregoing change at this time to maintain comfort. Patient in bed, eyes closed. No s/sx anxiety, agitation, NVD or respiratory distress. FLACC 0/10. CNAs and  at bedside. 1530 - Patient in bed, eyes closed. No s/sx anxiety, agitation, NVD or respiratory distress. FLACC 0/10.  provided with ice per request.    1436 - Patient in bed, eyes closed. No s/sx anxiety, agitation, NVD or respiratory distress. FLACC 0/10. Breathing shallow and irregular.     Report given to [address]

## 2020-11-03 NOTE — PROGRESS NOTES
1850 Report received from Royce Chopra RN. Pt identified by name and . Pt is under GIP care with a hospice diagnosis of Alzheimer's dementia without behavioral disrurbance. Pt being managed for pain, agitation, wound care. Pt in bed with eyes closed; no signs of pain or distress ntoed.  No s/sx anxiety, agitation, NVD, or SOB. Fent 12mcg to Left arm and verified.  Bed in lowest, locked position with siderails x2; call light within reach and tab alert in place. Pt room near nurses station for safety and observation. FLACC 0/10.      2017 Pt resting in bed with eyes closed; no signs of pain or distress noted. RR non labored. No s/sx NVD or SOB. FLACC 0/10.    2213 Pt resting in bed with eyes closed; no signs of pain or distress noted. RR non labored. No s/sx NVD or SOB. FLACC 0/10.    2329 Pt premedicated prior to bath and care. PRN Haldol and Morphine given for comfort. Pt tolerated care without complaint. RR non labored. No s/sx NVD or SOB. FLACC 0/10.    0131 Pt resting in bed with eyes closed; no signs of pain or distress noted. RR non labored. No s/sx NVD or SOB. FLACC 0/10.    0349  Pt resting in bed with eyes closed; no signs of pain or distress noted. RR non labored. No s/sx NVD or SOB. FLACC 0/10.    0354 Marylou care performed. Dressing to sacrum dry and intact. Pt awake in bed and tolerated care without complaint. RR non labored. No s/sx NVD or SOB. FLACC 0/10. Walking rounds completed with Sahara Ponce RN.

## 2020-11-04 NOTE — PROGRESS NOTES
Problem: Pressure Injury - Risk of  Goal: *Prevention of pressure injury  Description: Document Yeison Scale and appropriate interventions in the flowsheet. Outcome: Progressing Towards Goal  Note: Pressure Injury Interventions:  Sensory Interventions: Assess changes in LOC, Keep linens dry and wrinkle-free, Minimize linen layers, Pressure redistribution bed/mattress (bed type), Float heels    Moisture Interventions: Absorbent underpads, Maintain skin hydration (lotion/cream), Minimize layers    Activity Interventions: Assess need for specialty bed, Pressure redistribution bed/mattress(bed type), Increase time out of bed    Mobility Interventions: Float heels, HOB 30 degrees or less, Pressure redistribution bed/mattress (bed type)    Nutrition Interventions: Document food/fluid/supplement intake    Friction and Shear Interventions: Apply protective barrier, creams and emollients, HOB 30 degrees or less, Minimize layers                Problem: Falls - Risk of  Goal: *Absence of Falls  Description: Document Soraya Fall Risk and appropriate interventions in the flowsheet. Outcome: Progressing Towards Goal  Note: Fall Risk Interventions:       Mentation Interventions: Adequate sleep, hydration, pain control, Bed/chair exit alarm, Door open when patient unattended    Medication Interventions: Bed/chair exit alarm    Elimination Interventions: Bed/chair exit alarm, Call light in reach              Problem: Pain  Goal: *Control of Pain  Description: Irwin Martinez will have pain control AEB a FLACC score of 2 or less. Outcome: Progressing Towards Goal     Problem: Anxiety/Agitation  Goal: Verbalize and demonstrate ability to manage anxiety  Description: The patient/family/caregiver will verbalize and demonstrate ability to manage the patient's anxiety throughout hospice care.   Outcome: Progressing Towards Goal     Problem: Patient Education: Go to Patient Education Activity  Goal: Patient/Family Education  Outcome: Resolved/Met     Problem: Patient Education: Go to Patient Education Activity  Goal: Patient/Family Education  Outcome: Resolved/Met

## 2020-11-04 NOTE — PROGRESS NOTES
Bedside Report taken from [facility] JULIA ANJELICA, Rn, Pt identified. Pt in bed with eyes closed; displaying no signs or symptoms of pain, dyspnea, agitation,seizures, nausea, or vomiting. FLACC 0. Bed locked and low, side rails up, tabs/bed alarm in place for pt. safety. Call light with in reach, and door opened for continued monitoring. Fentanyl patch visualized with Previous shift nurse patch intact. Care plan reviewed with Cassie Sharma Pt is an 55-year-old female with Alzheimers who is here GIP level of care for management of pain, agitation and wound management. Responds to painful stimuli. Moaning at times. Incontinent with briefs. No IV access. PO intake: NPO. Wounds: stage IV sacrum with flagyl and anti-fungal cream with calcium alginate dressings daily and PRN if soiled and stage II posterior neck with foam dressing. Pt is on air mattress for comfort. PRN medications: Haldol 2 mg for agitation, morphine 2 mg IV for pain, morphine 4 mg SQ x 2 prior to wound care. Scheduled meds:  Fentanyl 50 mcg TD patch (increased on 10/30)    0950 Pt observed and appears to be sleeping/resting, no facial grimacing, no moaning, easy relaxed respirations. No symptoms to manage at this time. Flacc 0/10    1055 Pt observed and appears to be sleeping/resting, no facial grimacing, no moaning, easy relaxed respirations. No symptoms to manage at this time. Flacc 0/10    1255 Pt observed and appears to be sleeping/resting, no facial grimacing, no moaning, easy relaxed respirations. No symptoms to manage at this time. Flacc 0/10    1405 Pt observed and appears to be sleeping/resting, no facial grimacing, no moaning, easy relaxed respirations. No symptoms to manage at this time. Flacc 0/10   updated and ice given to him as he requested. 1515 Pt observed and appears to be sleeping/resting, no facial grimacing, no moaning, easy relaxed respirations. No symptoms to manage at this time. Flacc 0/10   remains at bedside.      [address] Pt observed and appears to be sleeping/resting, no facial grimacing, no moaning, easy relaxed respirations. No symptoms to manage at this time. Flacc 0/10.  leaving for the evening.     1810 report to be given to Leigh Hall Rn

## 2020-11-04 NOTE — PROGRESS NOTES
Report received from [facility]. Care assumed and pt identified by name and . Pt resting in bed, a&ox , respirations present. No distress observed or voiced at this time and no s/sx of agitation, anxiety, pain, dyspnea, sob, n/v or seizures. FLACC score of 0/10. Bed low and locked, siderails x2, call light within pt's reach. Tab alert on and attached to pt. CNA operating under RN supervision. Current FLACC score 0/10.    2109:  No acute changes at this time. Pt remains sleeping with unlabored breathing. Call light remains in reach. Tab alarm remains connected to patient. No s/sx of agitation, anxiety, pain, dyspnea, sob, n/v or seizures. Bed in lowest locked position with siderails x2.    2341:  Pt resting in bed. No distress visualized. No s/sx of agitation, anxiety, pain, dyspnea, sob, n/v or seizures. Respirations present. Call light in reach. Tab alert on. Bed remains lowered and locked. 0015:  Morphine 4mg given sq prior to wound care/bath. 0100:  Pt resting in bed, no distress observed. Respirations present, however shallow and irregular at times. Call light remains in reach. Tab alarm remains connected to patient. No s/sx of agitation, anxiety, pain, dyspnea, sob, n/v or seizures. 8590:  No acute changes at this time. Pt remains sleeping with very shallow breathing and more irregular with occasional long pauses. Call light remains in reach. Tab alarm remains connected to patient. No s/sx of agitation, anxiety, pain, dyspnea, sob, n/v or seizures. Bed in lowest locked position with siderails x2.    0529:  Current FLACC score 0/10. No acute changes at this time. Pt remains sleeping with unlabored breathing. Call light remains in reach. Tab alarm remains connected to patient. No s/sx of agitation, anxiety, pain, dyspnea, sob, n/v or seizures.   Bed in lowest locked position with siderails x2.    0556: Writer preparing to change sacral dressing per orders with metronidazole crushed into miconazole. The pt was premedicated with Morphine 4mg as well as due to increased respirations noted and placed on left side. CNA alerted writer that pt's vital signs were very low:  BP 36/20 with a MAP of 27, respirations very rhythmic at 36 rpm.  Pt's eyes are open and glazed, and she does not track objects in room. Holding dressing change for now. Report given to Bernardo Sharma RN.

## 2020-11-04 NOTE — PROGRESS NOTES
Problem: Anticipatory Grief  Goal: Grief heard and acknowledged, anxiety reduced, patient coping identified, patient/family expressed gratitude  Description: Patient/ Family will acknowledge feelings of grief and anxiety and utilize available support including education on anticipatory grie     Problem: General Wound Care  Goal: Interventions  Description: Patient's pressure injuries will not worsen during her stay at [facility] as evidenced by stable measurements and assessment of wound bed/drainage. Note:  will assess grief responses with each visit.

## 2020-11-05 NOTE — PROGRESS NOTES
Progress Note    Patient: Salvador Suggs MRN: [id number]  SSN: [id number]    YOB: 1932  Age: 80 y.o. Sex: female      Admit Date: 10/21/2020    LOS: 15 days     Clinical Summary:     Kat Medley remains comfortable and her wound has stabilized since admission but is still quite advanced. She requires prewound narcotics to allow for dressing changes and care. Her lungs reveal moderate congestive changes but there are no signs of respiratory distress. Vitals:    11/03/20 0552 11/03/20 1540 11/04/20 0610 11/04/20 1631   BP: (!) 101/55 (!) 101/48 (!) 36/20 (!) 82/39   Pulse: 68 87 86 (!) 51   Resp: 14 14 (!) 36 24   Temp: 98 °F (36.7 °C) 98.5 °F (36.9 °C) 98.1 °F (36.7 °C) 97.3 °F (36.3 °C)            Clinical Assessment:     Principal Problem:    Alzheimer's dementia without behavioral disturbance ([address] (10/21/2020)    Active Problems:    Anorexia (8/27/2020)      Unintentional weight loss (8/27/2020)      Debility (8/27/2020)      Dehydration (8/27/2020)      Hypertension (8/27/2020)      Hospice care (9/9/2020)      Ataxia (10/23/2020)      History of hip fracture (10/23/2020)      Falls, sequela (10/23/2020)      Muscle injury (10/23/2020)        Treatment Plan:      I have reviewed the patient's Plan of Care with the nursing staff. She is comfortable and death is likely in the next 10 days.           Current Facility-Administered Medications   Medication Dose Route Frequency    fentaNYL (DURAGESIC) 50 mcg/hr patch 1 Patch  1 Patch TransDERmal Q72H    metroNIDAZOLE (FLAGYL) tablet 500 mg  500 mg Topical PRN    And    miconazole (MICOTIN) 2 % cream   Topical PRN    morphine injection 4 mg  4 mg SubCUTAneous Q20MIN PRN    haloperidol lactate (HALDOL) injection 2 mg  2 mg SubCUTAneous Q1H PRN    acetaminophen (TYLENOL) suppository 650 mg  650 mg Rectal Q3H PRN    bisacodyL (DULCOLAX) suppository 10 mg  10 mg Rectal PRN    haloperidol lactate (HALDOL) injection 2 mg  2 mg SubCUTAneous Q1H PRN    glycopyrrolate (ROBINUL) injection 0.2 mg  0.2 mg SubCUTAneous Q4H PRN    morphine injection 2 mg  2 mg SubCUTAneous Q20MIN PRN           Signed By: Angelyn Dakins, MD     November 5, 2020

## 2020-11-05 NOTE — PROGRESS NOTES
Progress Note    Patient: Bang Mancini MRN: [id number]  SSN: [id number]    YOB: 1932  Age: 80 y.o. Sex: female      Admit Date: 10/21/2020    LOS: 14 days     Subjective:     Unresponsive. NPO. Review of Systems:  Review of systems not obtained due to patient factors. Objective:     Vitals:    11/03/20 0552 11/03/20 1540 11/04/20 0610 11/04/20 1631   BP: (!) 101/55 (!) 101/48 (!) 36/20 (!) 82/39   Pulse: 68 87 86 (!) 51   Resp: 14 14 (!) 36 24   Temp: 98 °F (36.7 °C) 98.5 °F (36.9 °C) 98.1 °F (36.7 °C) 97.3 °F (36.3 °C)        Intake and Output:  Current Shift: No intake/output data recorded. Last three shifts: No intake/output data recorded. Physical Exam:   GENERAL: Unresponsive, appears older than stated age, pale, cachectic  LUNG: Coarse diminished breath sounds. Shallow and unlabored respirations. 30 second periods of apnea  HEART: regular rate and rhythm  ABDOMEN: soft, non-tender. Bowel sounds hypoactive. : Incontinent.    EXTREMITIES:  extremities with no cyanosis or edema. + pulses bilaterally. Upper extremities contracted.   SKIN: Pale. Warm to touch. 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   NEUROLOGIC: Unresponsive. Bedbound.      Lab/Data Review:  No new labs resulted in the last 24 hours.     Assessment:     Principal Problem:    Alzheimer's dementia without behavioral disturbance ([facility] [address] (10/21/2020)    Active Problems:    Anorexia (8/27/2020)      Unintentional weight loss (8/27/2020)      Debility (8/27/2020)      Dehydration (8/27/2020)      Hypertension (8/27/2020)      Hospice care (9/9/2020)      Ataxia (10/23/2020)      History of hip fracture (10/23/2020)      Falls, sequela (10/23/2020)      Muscle injury (10/23/2020)        Plan:     Current Facility-Administered Medications   Medication Dose Route Frequency    fentaNYL (DURAGESIC) 50 mcg/hr patch 1 Patch  1 Patch TransDERmal Q72H    metroNIDAZOLE (FLAGYL) tablet 500 mg  500 mg Topical PRN    And  miconazole (MICOTIN) 2 % cream   Topical PRN    morphine injection 4 mg  4 mg SubCUTAneous Q20MIN PRN    haloperidol lactate (HALDOL) injection 2 mg  2 mg SubCUTAneous Q1H PRN    acetaminophen (TYLENOL) suppository 650 mg  650 mg Rectal Q3H PRN    bisacodyL (DULCOLAX) suppository 10 mg  10 mg Rectal PRN    haloperidol lactate (HALDOL) injection 2 mg  2 mg SubCUTAneous Q1H PRN    glycopyrrolate (ROBINUL) injection 0.2 mg  0.2 mg SubCUTAneous Q4H PRN    morphine injection 2 mg  2 mg SubCUTAneous Q20MIN PRN       10/21: ([facility] pt-Osiris RN) Admitted GIP with Alzheimer's dementia without behavioral disturbance for management of pain, agitation and wound care.      1. Pain: Morphine 2mg IV/SQ Q20 minutes as needed. Fentanyl 50mcg patch in place.      2. Agitation: Haloperidol 2mg IV/SQ Q1 hour prn.     3. Wound Care: Turn and reposition Q2 hours and prn. Wound care as ordered for sacral wound. Air mattress in place for pressure reduction. Morphine 4mg IV/SQ prn dressing change.     4. Family/Pt Support: No family at bedside during exam. Medications and plan of care discussed with nursing staff. Will continue to monitor for symptoms and adjust medications as needed to maintain patient comfort.  PPS 10%. Case discussed with Dr. Jonnie Ray.     No changes in plan of care.         Signed By: Bentley Zurita NP     November 4, 2020

## 2020-11-05 NOTE — PROGRESS NOTES
Bedside Report taken from [facility]. Pt identified. Pt in bed with eyes closed; displaying no signs or symptoms of pain, dyspnea, agitation,seizures, nausea, or vomiting. FLACC 0. Bed locked and low, side rails up, tabs/bed alarm in place for pt. safety. Call light with in reach, and door opened for continued monitoring. Fentanyl patch visualized with Previous shift nurse patch intact. Care plan reviewed with Cna.     0727 Assessment completed; see flow sheets. Pt is an 59-year-old female with Alzheimers who is here GIP level of care for management of pain, agitation and wound management. Responds to painful stimuli. Moaning at times. Incontinent with briefs. No IV access.  PO intake: NPO. Wounds: stage IV sacrum with flagyl and anti-fungal cream with calcium alginate dressings daily and PRN if soiled and stage II posterior neck with foam dressing. Pt is on air mattress for comfort.  PRN medications: Haldol 2 mg for agitation, morphine 2 mg IV for pain, morphine 4 mg SQ x 2 prior to wound care. Scheduled meds:  Fentanyl 50 mcg TD patch (increased on 10/30)    0927 Pt observed and appears to be sleeping/resting, no facial grimacing, no moaning, easy relaxed respirations. No symptoms to manage at this time. Flacc 0/10    1020 Pt observed and appears to be sleeping/resting, no facial grimacing, no moaning, easy relaxed respirations. No symptoms to manage at this time. Flacc 0/10  1220 Pt observed and appears to be sleeping/resting, no facial grimacing, no moaning, easy relaxed respirations. No symptoms to manage at this time. Flacc 0/10    1420 Pt observed and appears to be sleeping/resting, no facial grimacing, no moaning, easy relaxed respirations. No symptoms to manage at this time. Flacc 0/10    1500 Pt scheduled medication given at this time. Fentanyl removed and placed to left chest. Pt observed and appears to be sleeping/resting, no facial grimacing, no moaning, easy relaxed respirations.  No symptoms to manage at this time. Flacc 0/10    1700 Pt observed and appears to be sleeping/resting, no facial grimacing, no moaning, easy relaxed respirations. No symptoms to manage at this time. Flacc 0/10.  Report to be Luis Aldana Rn.

## 2020-11-05 NOTE — PROGRESS NOTES
Problem: Falls - Risk of  Goal: *Absence of Falls  Description: Document Ivin Rhoades Fall Risk and appropriate interventions in the flowsheet. Outcome: Progressing Towards Goal  Note: Fall Risk Interventions:       Mentation Interventions: Adequate sleep, hydration, pain control, Bed/chair exit alarm, Door open when patient unattended    Medication Interventions: Bed/chair exit alarm    Elimination Interventions: Bed/chair exit alarm, Call light in reach              Problem: Pain  Goal: *Control of Pain  Description: Callum Arroyo will have pain control AEB a FLACC score of 2 or less.     Outcome: Progressing Towards Goal

## 2020-11-05 NOTE — PROGRESS NOTES
Progress Note    Patient: Phi Colin MRN: [id number]  SSN: [id number]    YOB: 1932  Age: 80 y.o. Sex: female      Admit Date: 10/21/2020    LOS: 14 days     Subjective:     Unresponsive. NPO. Has required Morphine 4mg x 2 SQ for pain with wound care. Review of Systems:  Review of systems not obtained due to patient factors. Objective:     Vitals:    11/03/20 0552 11/03/20 1540 11/04/20 0610 11/04/20 1631   BP: (!) 101/55 (!) 101/48 (!) 36/20 (!) 82/39   Pulse: 68 87 86 (!) 51   Resp: 14 14 (!) 36 24   Temp: 98 °F (36.7 °C) 98.5 °F (36.9 °C) 98.1 °F (36.7 °C) 97.3 °F (36.3 °C)        Intake and Output:  Current Shift: No intake/output data recorded. Last three shifts: No intake/output data recorded. Physical Exam:   GENERAL: Unresponsive, appears older than stated age, pale, cachectic  LUNG: Coarse diminished breath sounds. Shallow and labored respirations. Tachypnea. HEART: regular rate and rhythm  ABDOMEN: soft, non-tender. Bowel sounds hypoactive. : Incontinent.    EXTREMITIES:  extremities with no cyanosis or edema. Weak pulses. Upper extremities contracted.   SKIN: Pale. Cool to touch. 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   NEUROLOGIC: Unresponsive. Bedbound.     Lab/Data Review:  No new labs resulted in the last 24 hours.     Assessment:     Principal Problem:    Alzheimer's dementia without behavioral disturbance ([facility] [address] (10/21/2020)    Active Problems:    Anorexia (8/27/2020)      Unintentional weight loss (8/27/2020)      Debility (8/27/2020)      Dehydration (8/27/2020)      Hypertension (8/27/2020)      Hospice care (9/9/2020)      Ataxia (10/23/2020)      History of hip fracture (10/23/2020)      Falls, sequela (10/23/2020)      Muscle injury (10/23/2020)        Plan:     Current Facility-Administered Medications   Medication Dose Route Frequency    fentaNYL (DURAGESIC) 50 mcg/hr patch 1 Patch  1 Patch TransDERmal Q72H    metroNIDAZOLE (FLAGYL) tablet 500 mg  500 mg Topical PRN    And    miconazole (MICOTIN) 2 % cream   Topical PRN    morphine injection 4 mg  4 mg SubCUTAneous Q20MIN PRN    haloperidol lactate (HALDOL) injection 2 mg  2 mg SubCUTAneous Q1H PRN    acetaminophen (TYLENOL) suppository 650 mg  650 mg Rectal Q3H PRN    bisacodyL (DULCOLAX) suppository 10 mg  10 mg Rectal PRN    haloperidol lactate (HALDOL) injection 2 mg  2 mg SubCUTAneous Q1H PRN    glycopyrrolate (ROBINUL) injection 0.2 mg  0.2 mg SubCUTAneous Q4H PRN    morphine injection 2 mg  2 mg SubCUTAneous Q20MIN PRN       10/21: ([facility] pt-Osiris ARGUELLES) Admitted GIP with Alzheimer's dementia without behavioral disturbance for management of pain, agitation and wound care.      1. Pain: Morphine 2mg IV/SQ Q20 minutes as needed. Fentanyl 50mcg patch in place.      2. Agitation: Haloperidol 2mg IV/SQ Q1 hour prn.     3. Wound Care: Turn and reposition Q2 hours and prn. Wound care as ordered for sacral wound. Air mattress in place for pressure reduction. Morphine 4mg IV/SQ prn dressing change.     4. Family/Pt Support: No family at bedside during exam. Medications and plan of care discussed with nursing staff. Will continue to monitor for symptoms and adjust medications as needed to maintain patient comfort.  PPS 10%. Case discussed with Dr. Lee Thurman.     No changes in plan of care.  Complex wound care is required frequently for stage 4 wound to sacrum.        Signed By: Carlton Escudero NP     November 4, 2020

## 2020-11-05 NOTE — PROGRESS NOTES
1845 Report received from [address] with walking rounds. Patient identified by name and . Patient resting quietly in bed with eyes closed. Fentanyl patch confirmed to back of left arm. No signs or symptoms of distress noted at present. Bed in low and locked position, side rails up x2, call bell within reach, tab alarm in place. No family present at bedside. Door open for safety.  Shift assessment completed. Patient's level of care is general inpatient for management of pain, agitation, and complex wound care. Her hospice diagnosis is Alzheimer's dementia without behavioral disturbance. Dressing to sacrum is clean, dry, intact but foul odor is present. Patient resting quietly in bed with eyes closed, respirations even and unlabored but shallow. She grimaces and groans slightly when disturbed. Otherwise FLACC 0/10, no signs of distress or discomfort. Door open for patient's safety. 2246 Patient resting quietly in bed with eyes closed, respirations even and unlabored. FLACC 0/10, no signs of distress or discomfort. Door open for patient's safety. 0040 Gave PRN doses of morphine 4 mg and haldol 2 mg SC as premedication for bathing and potential wound care. 0110 Changed dressings to both patient's neck and sacrum. JEAN MARIE Liriano reports gnats flew out of the sacral wound when she removed her brief. Patient's respirations become faster during wound care but she no longer grimaces or groans. 0128 Gave second dose of morphine 4 mg SC for pain after dressing change. 0222 Patient resting quietly in bed with eyes closed after bath, respirations are unlabored but very shallow. FLACC 0/10, no signs of distress or discomfort. Door open for patient's safety. 8111 Patient with audible gurgling and breathing more labored. Gave PRN doses of morphine 2 mg SC for pain/dyspnea, glycopyrrolate 0.2 mg SC for gurgling, and haldol 2 mg SC for agitation.   Patient with Cheyne-Remy respirations. Chux pad changed and josé miguel care provided. Reinforced sacral dressing with a second ABD pad at this time. See flowsheets for additional patient observation/rounds. Report given to Eden Cantrell RN.

## 2020-11-06 NOTE — [facility] IDG MASTER NOTE
[facility]  Date: 11/06/20  Time: 2:13 PM    ___________________    Patient: Ted Hoffman  Coverage Information:     Payor: [place]     Plan: [place] MEDICARE PART A AND B     Subscriber ID: [id number]     Phone Number:   MRN: [id number]    Current Benefit Period: Benefit Period 1  Start Date: 8/31/2020  End Date: 11/28/2020        Hospice Attending Provider: Angely Betancourt, [address] [place] [address]  [id number]  Phone: [phone]  Fax: [phone]    Level of Care: General Inpatient Care      ___________________    Diagnoses: There were no encounter diagnoses.     Current Medications:    Current Facility-Administered Medications:     fentaNYL (DURAGESIC) 50 mcg/hr patch 1 Patch, 1 Patch, TransDERmal, Q72H, Ava Sheppard NP, 1 Patch at 11/05/20 1400    metroNIDAZOLE (FLAGYL) tablet 500 mg, 500 mg, Topical, PRN, 500 mg at 11/06/20 0811 **AND** miconazole (MICOTIN) 2 % cream, , Topical, PRN, Ava Sheppard NP    morphine injection 4 mg, 4 mg, SubCUTAneous, Q20MIN PRN, Angely Betancourt MD, 4 mg at 11/06/20 0808    haloperidol lactate (HALDOL) injection 2 mg, 2 mg, SubCUTAneous, Q1H PRN **OR** [DISCONTINUED] haloperidol lactate (HALDOL) injection 2 mg, 2 mg, IntraVENous, Q1H PRN, Ava Sheppard NP, 2 mg at 10/23/20 2049    acetaminophen (TYLENOL) suppository 650 mg, 650 mg, Rectal, Q3H PRN, Ava Sheppard NP    bisacodyL (DULCOLAX) suppository 10 mg, 10 mg, Rectal, PRN, Ava Sheppard NP    haloperidol lactate (HALDOL) injection 2 mg, 2 mg, SubCUTAneous, Q1H PRN, 2 mg at 11/06/20 0807 **OR** [DISCONTINUED] haloperidol lactate (HALDOL) injection 2 mg, 2 mg, IntraVENous, Q1H PRN, Ava Wolfgangar, NP, 2 mg at 10/25/20 1720    glycopyrrolate (ROBINUL) injection 0.2 mg, 0.2 mg, SubCUTAneous, Q4H PRN, Ava Wolfgangar, NP, 0.2 mg at 11/06/20 0246    morphine injection 2 mg, 2 mg, SubCUTAneous, Q20MIN PRN, 2 mg at 11/05/20 0552 **OR** [DISCONTINUED] morphine injection 2 mg, 2 mg, IntraVENous, Q20MIN PRN, Russ Islas NP, 2 mg at 10/25/20 1720    Orders:  Orders Placed This Encounter    IP CONSULT TO SPIRITUAL CARE Once on week one, then PRN. For [facility] patients only. For contracted patients, primary hospice will continue to manage spiritual care needs     Once on week one, then PRN. For [facility] patients only. For contracted patients, primary hospice will continue to manage spiritual care needs     Standing Status:   Standing     Number of Occurrences:   1     Order Specific Question:   Reason for Consult: Answer:   Spiritual crisis intervention or per patient or caregiver request    DIET PLEASURE     Standing Status:   Standing     Number of Occurrences:   1     Order Specific Question:   Likes/Dislikes/Preferences     Answer:   HOLD TRAY, thickened liquids    VITAL SIGNS     Standing Status:   Standing     Number of Occurrences:   1    NURSING-MISCELLANEOUS: Comfort Care Measures: Enter comfort measures above. CONTINUOUS     Enter comfort measures above. Standing Status:   Standing     Number of Occurrences:   1     Order Specific Question:   Description of Order:     Answer:   Comfort Care Measures:    JONES CATHETER, CARE     1. Jones Catheter care every shift and PRN  2. Notify Physician of Jones Catheter leakage, occlusion, gross adherent sediment or accidental removal  3. Change Jones 30 days after insertion. 4. May flush catheter prn leakage or gross adherent sediment or mucus. Standing Status:   Standing     Number of Occurrences:   1    BLADDER CHECKS     May scan bladder PRN for urinary retention and or patient discomfort     Standing Status:   Standing     Number of Occurrences:   1    NURSING ASSESSMENT:  SPECIFY Assess for GIP, routine, or respite level of care.  Q SHIFT Routine     Standing Status:   Standing     Number of Occurrences:   1     Order Specific Question:   Please describe the test or procedure you would like to order. Answer:   Assess for GIP, routine, or respite level of care.  PAIN ASSESSMENT Pain and Symptoms: Assess ever 4 hours and PRN, for GIP level of care. PRN Routine     Standing Status:   Standing     Number of Occurrences:   1     Order Specific Question:   Please describe the test or procedure you would like to order. Answer:   Pain and Symptoms: Assess ever 4 hours and PRN, for GIP level of care.  BEDREST, COMPLETE     Standing Status:   Standing     Number of Occurrences:   1    NURSING-MISCELLANEOUS: DME: Please order and place air mattress for pressure reduction. CONTINUOUS     Please order and place air mattress for pressure reduction. Standing Status:   Standing     Number of Occurrences:   1     Order Specific Question:   Description of Order:     Answer:   DME:    NURSING-MISCELLANEOUS: admit 10/21: [facility] Trae ARGUELLES) Admitted GIP with Alzheimer's dementia without behavioral disturbance for management of pain, agitation and wound care. Other Hospice diagnoses: Ataxia (R27.0) History of hip fracture (R87. ...     10/21: [facility] Trae ARGUELLES) Admitted GIP with Alzheimer's dementia without behavioral disturbance for management of pain, agitation and wound care. Other Hospice diagnoses:     Ataxia (R27.0)     History of hip fracture (Z87.81)     Fall, sequela (W19.XXXS)     Muscle injury (T14.90XA)     Debility (R53.81)     Hypertension, essential (I10)     Benefit Period 1  Start Date: 8/31/2020  End Date: 19/42/3258     I certiffarrukh Colin has a prognosis for a life expectancy of 6 months or less if the terminal illness runs its normal course.     As evidenced by an 44-year-old woman with precipitous acceleration of debility in Alzheimer's disease directly related to right gluteus medius, iliopsoas and hamstring tendon injury with hemorrhage which appears to be subacute.   E. coli urinary tract infection with sepsis requiring hospitalization 8/24/2020 is another related diagnoses contributing to adverse prognosis for this octogenarian. She has declined to requiring assist in transfers and ambulation, intermittent fecal and urinary incontinence, disorientation in place, time and intention. This is F AST 6 level functional compromise. In the setting of other life-threatening musculoskeletal and infectious complications this woman's life expectancy with care limited to comfort measures is less than 6 months. Her  has chosen to seek to avoid recurrent hospitalizations and to limit further care to comfort measures provided at home through the auspices of [facility]. Blood loss anemia, hypertension, and anorexia are further related problems contributing to poor prognosis        Standing Status:   Standing     Number of Occurrences:   1     Order Specific Question:   Description of Order:     Answer:   admit    WOUND CARE, DRESSING CHANGE     Wound Care:  Location: posterior neck   Decubitus Wounds Stage II - Cleanse wound location with wound cleanser, pat dry and apply foam dressing covering wound. Change every 3 days and PRN if soiled or not secure. Turn every 2 hours. Standing Status:   Standing     Number of Occurrences:   10    WOUND CARE, DRESSING CHANGE     Wound Care:  Location: sacrum  Decubitus Wounds Stage IV - Cleanse wound location with wound cleanser, pat dry. Apply metronidazole 500 mg crushed and mixed with 30 mL anti-fungal cream applied to wound bed. Apply gauze, calcium alginate dressing, abd pad and secure with tape. Place chux pad under wound. Change PRN if soiled or not secure.      Standing Status:   Standing     Number of Occurrences:   [id number]    DO NOT RESUSCITATE     Standing Status:   Standing     Number of Occurrences:   1    OXYGEN CANNULA Liters per minute: 2; Indications for O2 therapy: RESPIRATORY DISTRESS PRN Routine     Standing Status:   Standing     Number of Occurrences:   1     Order Specific Question:   Liters per minute: Answer:   2     Order Specific Question:   Indications for O2 therapy     Answer:   RESPIRATORY DISTRESS    haloperidol lactate (HALDOL) injection 2 mg    DISCONTD: haloperidol lactate (HALDOL) injection 2 mg    acetaminophen (TYLENOL) suppository 650 mg    bisacodyL (DULCOLAX) suppository 10 mg    haloperidol lactate (HALDOL) injection 2 mg    DISCONTD: haloperidol lactate (HALDOL) injection 2 mg    glycopyrrolate (ROBINUL) injection 0.2 mg    morphine injection 2 mg    DISCONTD: morphine injection 2 mg    DISCONTD: fentaNYL (DURAGESIC) 12 mcg/hr patch 1 Patch    DISCONTD: sodium chloride (NS) flush 3 mL    DISCONTD: sodium chloride (NS) flush 3 mL    DISCONTD: metroNIDAZOLE (FLAGYL) tablet 500 mg     Order Specific Question:   Antibiotic Indications     Answer: Other     Order Specific Question:   Other Abx Indication     Answer:   wound care    DISCONTD: miconazole (MICOTIN) 2 % cream    DISCONTD: morphine injection 4 mg    DISCONTD: metroNIDAZOLE (FLAGYL) tablet 500 mg     Order Specific Question:   Antibiotic Indications     Answer: Other     Order Specific Question:   Other Abx Indication     Answer:   wound care     Order Specific Question:   Suspected Organism(s)     Answer:   anaerobes in necrosis    DISCONTD: miconazole (MICOTIN) 2 % cream    morphine injection 4 mg    DISCONTD: metroNIDAZOLE (FLAGYL) tablet 500 mg     Order Specific Question:   Antibiotic Indications     Answer: Other     Order Specific Question:   Other Abx Indication     Answer:   wound care     Order Specific Question:   Suspected Organism(s)     Answer:   anaerobes in necrosis    DISCONTD: miconazole (MICOTIN) 2 % cream    DISCONTD: metroNIDAZOLE (FLAGYL) tablet 500 mg     Order Specific Question:   Antibiotic Indications     Answer:    Other     Order Specific Question:   Other Abx Indication     Answer:   wound care     Order Specific Question:   Suspected Organism(s)     Answer:   anaerobes in necrosis    DISCONTD: miconazole (MICOTIN) 2 % cream    AND Linked Order Group     metroNIDAZOLE (FLAGYL) tablet 500 mg      Order Specific Question:   Antibiotic Indications      Answer: Other      Order Specific Question:   Other Abx Indication      Answer:   wound care     miconazole (MICOTIN) 2 % cream    DISCONTD: fentaNYL (DURAGESIC) 25 mcg/hr patch 1 Patch    fentaNYL (DURAGESIC) 50 mcg/hr patch 1 Patch    INITIAL PHYSICIAN ORDER: HOSPICE Level Of Care: General Inpatient; Reason for Admission: 10/21: [facility] Trae ARGUELLES) Admitted GIP with Alzheimer's dementia without behavioral disturbance for management of pain, agitation and wound care. Standing Status:   Standing     Number of Occurrences:   1     Order Specific Question:   Status     Answer:   Hospice     Order Specific Question:   Level Of Care     Answer:   General Inpatient     Order Specific Question:   Reason for Admission     Answer:   10/21: [facility] Trae ARGUELLES) Admitted GIP with Alzheimer's dementia without behavioral disturbance for management of pain, agitation and wound care. Order Specific Question:   Inpatient Hospitalization Certified Necessary for the Following Reasons     Answer:   3. Patient receiving treatment that can only be provided in an inpatient setting (further clarification in H&P documentation)     Order Specific Question:   Admitting Diagnosis     Answer:   Alzheimer's dementia without behavioral disturbance [facility]) [[id number]]     Order Specific Question:   Terminal Prognosis Diagnosis(es)     Answer:   Alzheimer's dementia without behavioral disturbance [facility]) [[id number]]     Order Specific Question:   Admitting Physician     Answer:   Martha Billingsley     Order Specific Question:   Attending Physician     Answer:   Martha Billingsley     Order Specific Question:   Discharge Plan:     Answer: Other (Specify)    IP CONSULT TO [address] [place] [address] Street patients only.  For contracted patients, primary hospice will continue to manage social work needs     Standing Status:   Standing     Number of Occurrences:   1     Order Specific Question:   Reason for Consult: Answer: Once on week one, then PRN for Psychosocial crisis intervention or per patient or caregiver request.       Allergies: Allergies   Allergen Reactions    Niacin Unknown (comments)    Tolectin [Tolmetin] Unknown (comments)    Naproxen Other (comments)     GI IRRITATION       Care Plan:  Multidisciplinary Problems (Active)     Problem: Anticipatory Grief     Dates: Start: 10/22/20       Disciplines: Interdisciplinary    Goal: Grief heard and acknowledged, anxiety reduced, patient coping identified, patient/family expressed gratitude     Dates: Start: 10/22/20   Expected End: 11/06/20       Description: Patient/ Family will acknowledge feelings of grief and anxiety and utilize available support including education on anticipatory grief. Disciplines: Interdisciplinary    Intervention: Assess grief responses     Dates: Start: 10/22/20       Description: Juliana Fisher will assess grief reactions with each visit. Intervention: Support grieving process     Dates: Start: 10/22/20       Description: Juliana Fisher will support the grief process by providing opportunity for open communication and offering education on anticipatory grief. Problem: Falls - Risk of     Dates: Start: 10/21/20       Description: Pt will remain free from falls aeb no injuries while at [facility]. Disciplines: Interdisciplinary    Goal: *Absence of Falls     Dates: Start: 10/21/20   Expected End: 11/10/20       Description: Document Julian Dodge Fall Risk and appropriate interventions in the flowsheet.     Disciplines: Interdisciplinary                Problem: General Wound Care     Dates: Start: 10/31/20       Disciplines: Interdisciplinary    Goal: Interventions     Dates: Start: 10/31/20   Expected End: 11/07/20 Description: Patient's pressure injuries will not worsen during her stay at [facility] as evidenced by stable measurements and assessment of wound bed/drainage. Disciplines: Interdisciplinary    Intervention: Pain management     Dates: Start: 10/31/20             Intervention: Wound assessment, including wound bed description and dimensions     Dates: Start: 10/31/20             Intervention: Wound dressing change, sterile or clean     Dates: Start: 10/31/20             Intervention: Wound care (eg: Remove necrotic tissue; infection control/protection; absorb exudate; fill dead space; maintain moisture; maintain constant temperature of wound)     Dates: Start: 10/31/20             Intervention: Skin assessment     Dates: Start: 10/31/20             Intervention: Skin monitoring and care (e.g. skin cleansing; keep dry, moisturize, utilization of  lotion and/or skin barrier cream)     Dates: Start: 10/31/20             Intervention: Incontinence management     Dates: Start: 10/31/20                         Problem: Pain     Dates: Start: 10/21/20       Description: Morphine 2 mg every 20 minutes as needed  Morphine 4 mg with dressing changes    Disciplines: Nurse, Interdisciplinary, RT    Goal: *Control of Pain     Dates: Start: 10/21/20   Expected End: 11/11/20       Description: Bryan Nelson will have pain control AEB a FLACC score of 2 or less.       Disciplines: Nurse, Interdisciplinary, RT    Intervention: Assess pain characteristics (eg: Intensity scale; onset; location; quality; severity; duration; frequency; radiation)     Dates: Start: 10/21/20             Intervention: Assess pain management - barriers (eg: Past pain experiences)     Dates: Start: 10/21/20             Intervention: Identify pain expectations (eg: Patient's pain goal; somatic experiences; behavioral changes; affect)     Dates: Start: 10/21/20             Intervention: Identify pain medication concerns (eg: Cultural considerations; addiction concerns)     Dates: Start: 10/21/20             Intervention: Support system identification (eg: Caregiver; community resource; family; friends; [employer]; support group)     Dates: Start: 10/21/20             Intervention: Monitor for change in patient condition (eg:  Vital signs changes; changes in level of consciousness; nausea; behavioral changes)     Dates: Start: 10/21/20             Intervention: Medication side-effect assessment     Dates: Start: 10/21/20             Intervention: Pain-relief response reassessment (eg: Frequency based on route of administration; effectiveness)     Dates: Start: 10/21/20                         Problem: Pressure Injury - Risk of     Dates: Start: 10/21/20       Description: Pt will remain free from/ any further pressure injuries aeb no/progression pressure sores while at [facility]. Disciplines: Interdisciplinary    Goal: *Prevention of pressure injury     Dates: Start: 10/21/20   Expected End: 11/10/20       Description: Document Yeison Scale and appropriate interventions in the flowsheet.     Disciplines: Interdisciplinary                  Care Plan Problems/Goals      Progressing Towards Goal (5)      *Prevention of pressure injury (Pressure Injury - Risk of)    Disciplines:  Interdisciplinary Expected end:  11/10/20        Outcome: Progressing Towards Goal By Santos Colorado RN on 11/04/20 0755            *Absence of Falls (Falls - Risk of)    Disciplines:  Interdisciplinary Expected end:  11/10/20        Outcome: Progressing Towards Goal By Santos Colorado RN on 11/05/20 0722            *Control of Pain (Pain)    Disciplines:  Nurse, Interdisciplinary, RT Expected end:  11/11/20        Outcome: Progressing Towards Goal By Harriet Stephenson RN on 11/06/20 0345            Grief heard and acknowledged, anxiety reduced, patient coping identified, patient/family expressed gratitude (Anticipatory Grief)    Disciplines:  Interdisciplinary Expected end:  11/06/20        Outcome: Progressing Towards Goal By Jena Fox on 11/03/20 0208            Interventions (General Wound Care)    Disciplines:  Interdisciplinary Expected end:  11/07/20        Outcome: Progressing Towards Goal By Jayce Landa RN on 11/06/20 0345                         Resolved/Met (3)      Patient/Family Education (Patient Education: Go to Patient Education Activity)    Disciplines:  Interdisciplinary Expected end:  11/04/20        Outcome: Resolved/Met By Tom Sanders RN on 11/04/20 0755            Patient/Family Education (Patient Education: Go to Patient Education Activity)    Disciplines:  Interdisciplinary Expected end:  11/04/20        Outcome: Resolved/Met By Tom Sanders RN on 11/04/20 6917            Verbalize and demonstrate ability to manage anxiety (Anxiety/Agitation)    Disciplines:  Interdisciplinary Expected end:  11/06/20        Outcome: Resolved/Met By Jayce Landa RN on 11/05/20 0229                              ___________________    Care Team Notes          POC/IDG Notes      [facility] Nurse Notes by Lourdes Ac RN at 11/06/20 1412  Version 1 of 1    Author:  Lourdes Ac RN Service:  Hospice and Palliative Care Author Type:  Registered Nurse    Filed:  11/06/20 1413 Date of Service:  11/06/20 1412 Status:  Signed    :  Lourdes Ac RN (Registered Nurse)       Patient: Leonarda Emerson    Date: 11/06/20  Time: 2:12 PM    [facility] Nurse Notes  F/U IDG: Pt is an 79-year-old female with Alzheimers disease who is here GIP level of care for management of pain and wound care. Incontinent with no output in 24 hours. No IV access. PO intake: none. Wounds: stage IV to sacrum with dressing changes PRN with flagyl, antifungal cream, and calcium alginate; stage II on back of neck with foam dressing. Pt is on air mattress for comfort.  PRN medications: morphine 4 mg SQ x 2 for dressing changes, Haldol 2 mg SQ x 2 for agitation, glyco x 1 for secretions. Scheduled meds:  Fentanyl 50 mcg patch. Plan: No changes today. Comprehensive plan of care reviewed. IDG and pt./family in agreement with plan of care. The IDG identifies through on-going assessment when a change is needed to the POC; the pt/family will receive care and services necessitated by changes in POC. Medications reviewed by the pharmacist and Medical Director. Signed by: Cornel Mcclelland RN       [facility] IDG  Notes by Alli Hunter LMSW at 11/06/20 1138  Version 1 of 1    Author:  Alli Hunter LMSW Service:  Licensed Clinical  Author Type:      Filed:  11/06/20 1138 Date of Service:  11/06/20 1138 Status:  Signed    :  Alli Hunter LMSW ()       SW to assess coping and needs each visit and offer availability. [facility] IDG  Notes by Shirley Pugh at 11/06/20 1319  Version 1 of 1    Author:  Shirley Pugh Service:  Hospice and Palliative Care Author Type:  Pastoral Care    Filed:  11/06/20 0957 Date of Service:  11/06/20 4897 Status:  Signed    :  Shirley Pugh (Pastoral Care)       Patient: Curly Hayden    Date: 11/06/20  Time: 9:53 AM    [facility]  Notes    Patient Jyoti Madrid. Frutoso Flow old female, was admitted 10/21/20 with dx of Alzheimenr's dementia without behavioral disturbance. She came to [facility] from the [facility] in-home program.  Family includes her , a retired [employer] professor, and daughter Angelika Valero. Margret tradition is Zak Corey has provided Spiritual/emotional support to the patient/family through multiple visits since admission to [facility]. See notes recorded elsewhere in chart by Adrian Monson regarding Plan of Care for patient/family:    \"Intervention: Ministry of presence, scripture, prayer, family care.     Outcome: Patient is non responsive. .   was pleasant and appears to be coping appropriately.     Plan: Continue to provide spiritual and emotional support for patient and family. \"    Plan of care will continue as noted above for provision of spiritual/emotional support for patient/family.               Signed by: Chago Bah 61 Nurse Notes by Iban Cooley RN at 11/02/20 1331  Version 1 of 1    Author:  Iban Cooley RN Service:  Hospice and Palliative Care Author Type:  Registered Nurse    Filed:  11/02/20 1331 Date of Service:  11/02/20 1331 Status:  Signed    :  Iban Cooley RN (Registered Nurse)       Patient: Tram Saez    Date: 11/02/20  Time: 1:31 PM    [facility] [place] Nurse Notes  F/U IDG: Pt is an 43-year-old female with Alzheimers who is here GIP level of care for management of pain, agitation and wound management. Responds to painful stimuli. Moaning at times. Incontinent with briefs. Wet x 1 in past 24 hours. No IV access. PO intake: NPO. Wounds: stage IV sacrum with flagyl and anti-fungal cream with calcium alginate dressings daily and PRN if soiled and stage II posterior neck with foam dressing. Pt is on air mattress for comfort. PRN medications: Haldol 2 mg SQ x 2 for agitation, morphine 2 mg IV x 1 for pain, morphine 4 mg SQ x 2 prior to wound care. Scheduled meds:  Fentanyl 50 mcg TD patch (increased on 10/30). Plan: No changes today. Comprehensive plan of care reviewed. IDG and pt./family in agreement with plan of care. The IDG identifies through on-going assessment when a change is needed to the POC; the pt/family will receive care and services necessitated by changes in POC. Medications reviewed by the pharmacist and Medical Director.           Signed by: Barney Sheth RN       [facility] IDG  Notes by Keaton Menchaca LMSW at 11/02/20 1328  Version 1 of 1    Author:  Keaton Menchaca LMSW Service:  Licensed Clinical  Author Type:  Licensed Masters in Social Work    Filed:  11/02/20 7461 Date of Service:  11/02/20 1328 Status:  Signed    :  Keaton Menchaca LMSW (Licensed Masters in Social Work)       Emotional support; Progressing toward goals AEB pt/family demonstrates adequate coping and verbalizes emotional needs are being addressed       [facility] IDG  Notes by Harpreet Barrera at 11/02/20 1142  Version 1 of 1    Author:  Harpreet Barrera Service:  Spiritual Care Author Type:  Pastoral Care    Filed:  11/02/20 1240 Date of Service:  11/02/20 1142 Status:  Signed    :  Harpreet Barrera (Pastoral Care)       Patient: Maixne Devi    Date: 11/02/20  Time: 11:42 AM    [facility]  Notes    Intervention: Ministry of presence, scripture, prayer, family care. Outcome: Patient is non responsive. .   was pleasant and appears to be coping appropriately. Plan: Continue to provide spiritual and emotional support for patient and family. Signed by: Sheldon John       [facility] IDG  Notes by Harpreet Barrera at 10/29/20 1243  Version 1 of 1    Author:  Harpreet Barrera Service:  Spiritual Care Author Type:  Pastoral Care    Filed:  10/29/20 1244 Date of Service:  10/29/20 1243 Status:  Signed    :  Harpreet Barrera (Pastoral Care)       Patient: Maxine Devi    Date: 10/29/20  Time: 12:43 PM    [facility]  Notes  Intervention: Ministry of presence, family care, prayer and scripture. Outcome:  expressed appreciation for the care of his wife, patient appeared to be obtunded. Plan: continue to provide spiritual and emotional support.          Signed by: Sheldon John       [facility] IDG Nurse Notes by Rachel De La Torre RN at 10/29/20 1206  Version 1 of 1    Author:  Rachel De La Torre RN Service:  Hospice and Palliative Care Author Type:  Registered Nurse    Filed:  10/29/20 1206 Date of Service:  10/29/20 1206 Status:  Signed    :  Rachel De La Torre RN (Registered Nurse)       Patient: Maxine Devi    Date: 10/29/20  Time: 12:06 PM    [facility] Nurse Notes  F/U IDG: Pt is an 80-year-old female with Alzheimers who is here GIP level of care from [facility] in home program for management of pain and complicated wound care and agitation. Afebrile. Responds only to painful stimuli. Pt is incontinent in briefs x 2. No IV access. PO intake: none x 8 days. Wounds: stage IV wound to sacrum with daily dressing changes, stage II to back of neck with foam dressing. Pt is on air mattress for comfort. PRN medications: Haldol 2 mg SQ x 2 for agitation, morphine 2 mg SQ x 2 for pain. Scheduled meds:  Fentanyl 25 mcg patch (increased 10/28). Plan: Discontinue scheduled vital signs. Comprehensive plan of care reviewed. IDG and pt./family in agreement with plan of care. The IDG identifies through on-going assessment when a change is needed to the POC; the pt/family will receive care and services necessitated by changes in POC. Medications reviewed by the pharmacist and Medical Director. Signed by: Eusebio Evans RN       [address] [place] [address]  Notes by Zully Leal LMSW at 10/29/20 1134  Version 1 of 1    Author:  Zully Leal LMSW Service:  Licensed Clinical  Author Type:      Filed:  10/29/20 1134 Date of Service:  10/29/20 1134 Status:  Signed    :  Zully Leal LMSW ()       SW to assess coping and needs each visit and offer availability. [address] [place] [address]  Notes by Joss Hunt LMSW at 10/26/20 1756  Version 1 of 1    Author:  Joss Hunt LMSW Service:  Licensed Clinical  Author Type:  Licensed Masters in Social Work    [address] [place] [address] Way:  10/26/20 417 [facility] Date of Service:  10/26/20 1756 Status:  Signed    :  Joss Hunt LMSW (Licensed Masters in Social Work)       Pt/family are receiving emotional support AEB pt/family verbalize emotional needs are addressed each visit.        [address] [place] [address] Nurse Notes by Desiree Crum RN at 10/26/20 5226  Version 1 of 1    Author:  Desiree Crum RN Service:  Hospice and Palliative Care Author Type:  Registered Nurse    Filed: 10/26/20 1258 Date of Service:  10/26/20 1258 Status:  Signed    :  Mary Garsia RN (Registered Nurse)       Patient: Merari Coles    Date: 10/26/20  Time: 12:58 PM    [facility] Nurse Notes  F/U IDG: Pt is an 59-year-old female with Alzheimers who is here GIP level of care from [facility] in home program for management of wounds, pain and agitation. Temp 100.5 ax yesterday but is back to normal range today. Macias with UOP of 150 cc. IV access. PO intake: none. 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 Pt is requiring frequent dressing changes. Pt is on air mattress for comfort. PRN medications: Haldol 2 mg SQ x 3 for agitation, morphine 2 mg SQ x 3 for pain. Scheduled meds:  Fentanyl 12 mcg patch, antifungal with dressing changes. Plan: Dressing changes q 12 hours to debride wound. Add calcium alginate to wound care orders. Comprehensive plan of care reviewed. IDG and pt./family in agreement with plan of care. The IDG identifies through on-going assessment when a change is needed to the POC; the pt/family will receive care and services necessitated by changes in POC. Medications reviewed by the pharmacist and Medical Director. Signed by: Tatianna Tabor RN       [facility] IDG Other Notes by Carly Headley at 10/26/20 1148  Version 1 of 1    Author:  Carly Headley Service:  Spiritual Care Author Type:  Pastoral Care    Filed:  10/26/20 1149 Date of Service:  10/26/20 1148 Status:  Signed    :  Carly Headley (Pastoral Care)          MSW continues to be available for support as needed, requested or referred.         [address] [facility]  Notes by Carly Headley at 10/26/20 1146  Version 1 of 1    Author:  Carly Headley Service:  Spiritual Care Author Type:  Pastoral Care    Filed:  10/26/20 1148 Date of Service:  10/26/20 1146 Status:  Signed    :  Carly Headley (Pastoral Care)       Patient: Merari Coles    Date: 10/26/20  Time: 11:46 AM    [facility]  Notes    Intervention: Ministry of presence, prayer, conversation with >    Outcome: Patient appeared to be sleeping soundly. Met . He has difficulty hearing. He was pleasant and appreciative of all the care his wife is receiving. Plan: Continue to provide spiritual and emotional support throughtout patience's time at [facility]. Signed by: Raul Wong       [facility] IDG Nurse Notes by Mariza Briscoe RN at 10/23/20 1413  Version 1 of 1    Author:  Mariza Briscoe RN Service:  Hospice and Palliative Care Author Type:  Registered Nurse    Filed:  10/23/20 1414 Date of Service:  10/23/20 1413 Status:  Signed    :  Mariza Briscoe RN (Registered Nurse)       Patient: Veda Turner    Date: 10/23/20  Time: 2:13 PM    [facility] Nurse Notes  1st IDG: Pt is an 27-year-old female with Alzheimers disease who is here GIP level of care from [facility] in home program for management of pain, agitation and wound care. Temp 100.3 ax. Macias with UOP of 25 cc also leaking. PO intake: none. IV access in right wrist.  Wounds: stage III on sacrum. PRN medications: Haldol 2 mg SQ/IV x 7 for agitation, morphine 2 mg IV/SQ x 8 for pain. Scheduled meds:  fentanyl 12 mcg patch added yesterday. Plan: Add antifungal cream/flagyl for wound care with gauze/ABD pad q 48 hours. Comprehensive plan of care reviewed. IDG and pt./family in agreement with plan of care. The IDG identifies through on-going assessment when a change is needed to the POC; the pt/family will receive care and services necessitated by changes in POC. Medications reviewed by the pharmacist and Medical Director.         Signed by: Tesha Shi RN       [facility] IDG  Notes by Anju Paniagua at 10/23/20 1236  Version 1 of 1    Author:  Anju Paniagua Service:  Spiritual Care Author Type:  Pastoral Care    Filed:  10/23/20 1242 Date of Service:  10/23/20 1236 Status:  Signed    :  Anju Paniagua (Pastoral Care)       Patient: Aayush Fernandez    Date: 10/23/20  Time: 12:36 PM    [facility]  Notes    Intervention: Ministry of presence. Prayer and completion of assessments. Outcome; Patient resting with eyes closed. Plan: Continue to provide spiritual and emotional support. Signed by: Rufus Large       [facility] IDG  Notes by Sherren Penton, LMSW at 10/23/20 1049  Version 1 of 1    Author:  Sherren Penton, LMSW Service:  Licensed Clinical  Author Type:      Filed:  10/23/20 1050 Date of Service:  10/23/20 1049 Status:  Signed    :  Sherren Penton, LMSW ()       SW to assess coping and needs each visit and offer availability. [facility] IDG  Notes by Sebastián Villalta at 10/22/20 0946  Version 1 of 1    Author:  Sebastián Villalta Service:  Spiritual Care Author Type:  Pastoral Care    Filed:  10/22/20 0947 Date of Service:  10/22/20 0946 Status:  Signed    :  Sebastián Villalta (Pastoral Care)       Patient: Aayush Fernandez    Date: 10/22/20  Time: 9:46 AM    [facility]  Notes    / Grief Counselor has reviewed  Initial Comprehensive Assessment and plan of care. Bereavement and Spiritual Care Assessments to be completed and plan of care put in place to meet the needs, requests and referrals. Signed by: Rufus Large       [facility] IDG Nurse Notes by Vonnie John RN at 10/21/20 2140  Version 1 of 1    Author:  Vonnie John RN Service:  -- Author Type:  Registered Nurse    Filed:  10/21/20 2222 Date of Service:  10/21/20 2140 Status:  Signed    :  Vonnie John RN (Registered Nurse)       Patient: Aayush Fernandez    Date: 10/21/20  Time: 9:40 PM    [facility] Nurse Notes    Patient is an 51-year-old female admitted from the in-home program for [facility] level of care with a hospice diagnosis of Alzheimer's for management of pain, agitation, and wound care.   The patient has had an acceleration of her Alzheimer's debility related to her right gluteus medius, iliopsoas, and hamstring tendon injury. The patient has had more fecal and urinary incontinence and more disorientation. The patient's Stage III wound has also made care for her at home more complex. The patient has come to [facility] for wound care and to better manage the patient's pain and agitation with injectable medications. On admission, patient was noted to be grimacing and moaning. FLACC 5/10. Patient was medicated with PRN Morphine 2 mg SQ for pain and PRN Haldol 2 mg SQ for agitation. On admission, the patient's miles catheter was noted to be leaking. New miles catheter was placed via sterile technique with CNA assist.  Allevyn to sacrum was removed and a wet to moist dressing was applied to the sacrum per orders. Patient tolerated the procedure fair. Patient has no IV access. Patient was alert but was nonverbal with this nurse. Bilateral hands were noted to be contracted. Patient on air mattress for pressure relief. No family was here at time of admission. Medications found in patient suitcase and locked up per facility policy. Admission complete and hospice care plan initiated which includes spiritual, psychosocial, and bereavement. No initial needs identified. IDG team, including MD, made aware of care plans. Volunteer services suspended due to COVID-19.       Signed by: Juan Linton RN                Care Team Present:   Team Members Present: (see sign in sheet for attendees)

## 2020-11-06 NOTE — PROGRESS NOTES
Progress Note    Patient: Anita Jean MRN: [id number]  SSN: [id number]    YOB: 1932  Age: 80 y.o. Sex: female      Admit Date: 10/21/2020    LOS: 16 days     Subjective:     Unresponsive. NPO. Has required glycopyrrolate x1 for secretions, Morphine 4mg x 2 SQ with wound care and haldol x 2 for agitation. Review of Systems:  Review of systems not obtained due to patient factors. Objective:     Vitals:    11/03/20 1540 11/04/20 0610 11/04/20 1631 11/05/20 1804   BP:  (!) 36/20 (!) 82/39 (!) 81/52   Pulse:  86 (!) 51 83   Resp: 14 (!) 36 24 20   Temp: 98.5 °F (36.9 °C) 98.1 °F (36.7 °C) 97.3 °F (36.3 °C) 97 °F (36.1 °C)        Intake and Output:  Current Shift: No intake/output data recorded. Last three shifts: No intake/output data recorded. Physical Exam:   GENERAL: Unresponsive, appears older than stated age, pale, cachectic  LUNG: Coarse diminished breath sounds. Shallow and labored respirations. Tachypnea. HEART: regular rate and rhythm  ABDOMEN: soft, non-tender. Bowel sounds hypoactive. : Incontinent.    EXTREMITIES:  extremities with no cyanosis or edema. Weak pulses. Upper extremities contracted.   SKIN: Pale. Cool to touch. Feet are purple. 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   NEUROLOGIC: Unresponsive. Bedbound. Lab/Data Review:  No new labs resulted in the last 24 hours.     Assessment:     Principal Problem:    Alzheimer's dementia without behavioral disturbance ([facility] [address] (10/21/2020)    Active Problems:    Anorexia (8/27/2020)      Unintentional weight loss (8/27/2020)      Debility (8/27/2020)      Dehydration (8/27/2020)      Hypertension (8/27/2020)      Hospice care (9/9/2020)      Ataxia (10/23/2020)      History of hip fracture (10/23/2020)      Falls, sequela (10/23/2020)      Muscle injury (10/23/2020)        Plan:     Current Facility-Administered Medications   Medication Dose Route Frequency    fentaNYL (DURAGESIC) 50 mcg/hr patch 1 Patch  1 Patch TransDERmal Q72H    metroNIDAZOLE (FLAGYL) tablet 500 mg  500 mg Topical PRN    And    miconazole (MICOTIN) 2 % cream   Topical PRN    morphine injection 4 mg  4 mg SubCUTAneous Q20MIN PRN    haloperidol lactate (HALDOL) injection 2 mg  2 mg SubCUTAneous Q1H PRN    acetaminophen (TYLENOL) suppository 650 mg  650 mg Rectal Q3H PRN    bisacodyL (DULCOLAX) suppository 10 mg  10 mg Rectal PRN    haloperidol lactate (HALDOL) injection 2 mg  2 mg SubCUTAneous Q1H PRN    glycopyrrolate (ROBINUL) injection 0.2 mg  0.2 mg SubCUTAneous Q4H PRN    morphine injection 2 mg  2 mg SubCUTAneous Q20MIN PRN       10/21: ([facility] pt-Osir[facility] ARGUELL[facility]) Admitted GIP with Alzheimer's dementia without behavioral disturbance for management of pain, agitation and wound care.      1. Pain: Morphine 2mg IV/SQ Q20 minutes as needed. Fentanyl 50mcg patch in place.      2. Agitation: Haloperidol 2mg IV/SQ Q1 hour prn.     3. Wound Care: Turn and reposition Q2 hours and prn. Wound care as ordered for sacral wound. Air mattress in place for pressure reduction. Morphine 4mg IV/SQ prn dressing change.     4. Family/Pt Support: No family at bedside during exam. Medications and plan of care discussed with nursing staff. Will continue to monitor for symptoms and adjust medications as needed to maintain patient comfort. PPS 10%. Case discussed with Dr. Alessandro Mora and [facility].     No changes in plan of care. Complex wound care is required frequently for stage 4 wound to sacrum.       Signed By: Razia Mckeon NP     November 6, 2020

## 2020-11-06 NOTE — [facility] IDG NURSE NOTES
Patient: Katherine Tello    Date: 11/06/20  Time: 2:12 PM    [facility] Nurse Notes  F/U IDG: Pt is an 70-year-old female with Alzheimers disease who is here GIP level of care for management of pain and wound care. Incontinent with no output in 24 hours. No IV access. PO intake: none. Wounds: stage IV to sacrum with dressing changes PRN with flagyl, antifungal cream, and calcium alginate; stage II on back of neck with foam dressing. Pt is on air mattress for comfort. PRN medications: morphine 4 mg SQ x 2 for dressing changes, Haldol 2 mg SQ x 2 for agitation, glyco x 1 for secretions. Scheduled meds:  Fentanyl 50 mcg patch. Plan: No changes today. Comprehensive plan of care reviewed. IDG and pt./family in agreement with plan of care. The IDG identifies through on-going assessment when a change is needed to the POC; the pt/family will receive care and services necessitated by changes in POC. Medications reviewed by the pharmacist and Medical Director.           Signed by: Cherylene Lout, RN

## 2020-11-06 NOTE — PROGRESS NOTES
1845 Report received from [address] with walking rounds. Patient identified by name and . Patient resting quietly in bed with eyes closed. Fentanyl patch noted to left chest.  No signs or symptoms of distress noted at present. Bed in low and locked position, side rails up x2, call bell within reach, tab alarm in place. No family present at bedside. Door open for safety.  Shift assessment completed at this time. Patient's level of care is general inpatient for management of pain and complex wound care. New symptom of troubling secretions developed within the last 24 hours. Hospice diagnosis is Alzheimer's dementia without behavioral disturbances. Patient opens eyes to stimulus but does not track or follow commands. 220 Patient resting quietly in bed with eyes closed, respirations shallow and irregular but unlabored. FLACC 0/10, no signs of distress or discomfort. Door open for patient's safety. 0026 Chux pad remains dry, no urine output this shift. Mouth care completed. Patient resting quietly in bed with eyes closed, respirations shallow and irregular but unlabored. FLACC 0/10, no signs of distress or discomfort. Door open for patient's safety. 0924 Patient resting quietly in bed with eyes closed, respirations shallow but even and unlabored. Color is dusky, no apnea noted at present. FLACC 0/10, no signs of distress or discomfort. Door open for patient's safety. 0246 Gave PRN doses of morphine 4 mg SC and haldol 2 mg SC as premedication for wound check as well as glycopyrrolate 0.2 mg SC for secretions. 0300 Reinforced sacral dressing with a new ABD pad. Patient's feet are cold without pulses. She has apneic pauses when turned. 3780 Patient resting quietly in bed with eyes closed, respirations even and unlabored. FLACC 0/10, no signs of distress or discomfort. Door open for patient's safety.     See flowsheets for additional patient observation/rounds. Report given to Mario Cannon RN.

## 2020-11-06 NOTE — [facility] IDG CHAPLAIN NOTES
Patient: Jio November    Date: 11/06/20  Time: 9:53 AM    [facility]  Notes    Patient Maryann Hardingezer [place] old female, was admitted 10/21/20 with dx of Alzheimenr's dementia without behavioral disturbance. She came to [facility] from the [facility] in-home program.  Family includes her , a retired [employer] professor, and daughter Giselle Jane. Margret tradition is Donnella Call has provided Spiritual/emotional support to the patient/family through multiple visits since admission to [facility]. See notes recorded elsewhere in chart by Hannah Suero regarding Plan of Care for patient/family:    \"Intervention: Ministry of presence, scripture, prayer, family care.     Outcome: Patient is non responsive. .   was pleasant and appears to be coping appropriately.     Plan: Continue to provide spiritual and emotional support for patient and family. \"    Plan of care will continue as noted above for provision of spiritual/emotional support for patient/family.               Signed by: Chano Enriquez

## 2020-11-06 NOTE — PROGRESS NOTES
9247- The patient report taken and walking rounds completed with Georgie Harrell RN. The patient was identified by name and . The patient is resting in the bed with eyes closed and no signs of distress observed. 0/10 pain level with no signs of agitation, SOB, nausea / vomit or other distress observed. The bed is low and locked with two bed rails up and the tab alert in place. 4432- The patient was medicated with Morphine 4 mg for pain during dressing change and with Haldol 2 mg for anxiety while dressing is changed and with turning. Both medications administered subcutaneous. The dressing was wet and was changed as ordered. The patient tolerated it fair. Positioned supine and floated on pillows. 0830- The patient is resting with relaxed facial features. No signs of pain or anxiety shown. Respirations are at 12 minute but shallow. 1100- Patient was turned and repositioned in the bed with no signs of distress observed. The patient's  is at the bedside and was told that the patient was very close to her demise. He voiced understanding. 1335- The patient continues to rest with no signs of distress. Allowed the patient's  to speak about their years together. He states that he is praying the patient passes before he leaves for the day. 1405- The patient has no obtainable vital signs. The patient's  states that although he is sad, he is happy she is no longer in pain. He states that he will be using [address] Patient's  has the patient's ring.

## 2020-11-06 NOTE — PROGRESS NOTES
PATIENT DEATH:    Khalif Iyer learned of patient death from RN, Dave Nielsen .  proceded to the room and offered condolences and expressed sympathy to patient's spouse. Support provided through active listening/life review, as patient's spouse spoke of life with his wife of nearly 79 years. 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  explained [facility]avement support available through [facility] and encouraged patient's spouse to utilize bereavement support as needed or desired.

## 2024-10-03 NOTE — [facility] IDG NURSE NOTES
Patient: Yesica Flores    Date: 10/23/20  Time: 2:13 PM    [facility] Nurse Notes  1st IDG: Pt is an 17-year-old female with Alzheimers disease who is here GIP level of care from [facility] in home program for management of pain, agitation and wound care. Temp 100.3 ax. Macias with UOP of 25 cc also leaking. PO intake: none. IV access in right wrist.  Wounds: stage III on sacrum. PRN medications: Haldol 2 mg SQ/IV x 7 for agitation, morphine 2 mg IV/SQ x 8 for pain. Scheduled meds:  fentanyl 12 mcg patch added yesterday. Plan: Add antifungal cream/flagyl for wound care with gauze/ABD pad q 48 hours. Comprehensive plan of care reviewed. IDG and pt./family in agreement with plan of care. The IDG identifies through on-going assessment when a change is needed to the POC; the pt/family will receive care and services necessitated by changes in POC. Medications reviewed by the pharmacist and Medical Director.         Signed by: Ajit Rea RN Yes that's good actually